# Patient Record
Sex: FEMALE | Race: WHITE | NOT HISPANIC OR LATINO | Employment: OTHER | URBAN - NONMETROPOLITAN AREA
[De-identification: names, ages, dates, MRNs, and addresses within clinical notes are randomized per-mention and may not be internally consistent; named-entity substitution may affect disease eponyms.]

---

## 2023-03-09 ENCOUNTER — TELEPHONE (OUTPATIENT)
Dept: PRIMARY CARE | Facility: CLINIC | Age: 78
End: 2023-03-09
Payer: COMMERCIAL

## 2023-03-09 DIAGNOSIS — I10 BENIGN HYPERTENSION: ICD-10-CM

## 2023-03-09 DIAGNOSIS — I48.0 PAROXYSMAL ATRIAL FIBRILLATION (MULTI): ICD-10-CM

## 2023-03-10 RX ORDER — INSULIN LISPRO 100 [IU]/ML
INJECTION, SUSPENSION SUBCUTANEOUS
COMMUNITY
End: 2023-05-01 | Stop reason: SDUPTHER

## 2023-03-10 RX ORDER — ACETAMINOPHEN 325 MG/1
TABLET, CHEWABLE ORAL
COMMUNITY

## 2023-03-10 RX ORDER — ASPIRIN 81 MG/1
1 TABLET ORAL DAILY
COMMUNITY

## 2023-03-10 RX ORDER — PEN NEEDLE, DIABETIC 32 GX 1/4"
NEEDLE, DISPOSABLE MISCELLANEOUS
COMMUNITY
Start: 2022-06-14 | End: 2023-05-01 | Stop reason: SDUPTHER

## 2023-03-10 RX ORDER — MULTIVITAMIN
TABLET ORAL
COMMUNITY

## 2023-03-10 RX ORDER — LOVASTATIN 20 MG/1
20 TABLET ORAL NIGHTLY
COMMUNITY
End: 2024-05-20 | Stop reason: SDUPTHER

## 2023-03-10 RX ORDER — LISINOPRIL 40 MG/1
40 TABLET ORAL DAILY
COMMUNITY
End: 2023-07-28

## 2023-03-10 RX ORDER — APIXABAN 5 MG/1
1 TABLET, FILM COATED ORAL 2 TIMES DAILY
COMMUNITY
End: 2023-09-18 | Stop reason: SDUPTHER

## 2023-03-10 RX ORDER — ALLOPURINOL 300 MG/1
300 TABLET ORAL DAILY
COMMUNITY
End: 2024-04-25 | Stop reason: WASHOUT

## 2023-03-10 RX ORDER — BUMETANIDE 0.5 MG/1
0.5 TABLET ORAL DAILY
COMMUNITY
End: 2023-06-26 | Stop reason: ALTCHOICE

## 2023-03-10 RX ORDER — LEVOTHYROXINE SODIUM 137 UG/1
137 TABLET ORAL DAILY
COMMUNITY
End: 2024-02-20 | Stop reason: SDUPTHER

## 2023-03-10 RX ORDER — AMLODIPINE BESYLATE 5 MG/1
TABLET ORAL
COMMUNITY
Start: 2023-03-07 | End: 2023-07-28

## 2023-03-10 RX ORDER — METOPROLOL TARTRATE 75 MG/1
75 TABLET, FILM COATED ORAL 2 TIMES DAILY
Qty: 180 TABLET | Refills: 0 | Status: SHIPPED | OUTPATIENT
Start: 2023-03-10 | End: 2023-03-13 | Stop reason: SDUPTHER

## 2023-03-10 RX ORDER — LORATADINE 10 MG/1
TABLET ORAL
COMMUNITY

## 2023-03-10 RX ORDER — METOPROLOL TARTRATE 75 MG/1
75 TABLET, FILM COATED ORAL 2 TIMES DAILY
COMMUNITY
End: 2023-03-10 | Stop reason: SDUPTHER

## 2023-03-13 DIAGNOSIS — I48.0 PAROXYSMAL ATRIAL FIBRILLATION (MULTI): ICD-10-CM

## 2023-03-13 DIAGNOSIS — I10 BENIGN HYPERTENSION: ICD-10-CM

## 2023-03-13 RX ORDER — METOPROLOL TARTRATE 75 MG/1
75 TABLET, FILM COATED ORAL 2 TIMES DAILY
Qty: 180 TABLET | Refills: 0 | Status: SHIPPED | OUTPATIENT
Start: 2023-03-13 | End: 2023-05-01 | Stop reason: SDUPTHER

## 2023-04-25 LAB
ANION GAP IN SER/PLAS: 11 MMOL/L (ref 10–20)
CALCIUM (MG/DL) IN SER/PLAS: 8.7 MG/DL (ref 8.6–10.3)
CARBON DIOXIDE, TOTAL (MMOL/L) IN SER/PLAS: 34 MMOL/L (ref 21–32)
CHLORIDE (MMOL/L) IN SER/PLAS: 103 MMOL/L (ref 98–107)
CREATININE (MG/DL) IN SER/PLAS: 1.25 MG/DL (ref 0.5–1.05)
GFR FEMALE: 44 ML/MIN/1.73M2
GLUCOSE (MG/DL) IN SER/PLAS: 107 MG/DL (ref 74–99)
POTASSIUM (MMOL/L) IN SER/PLAS: 3.9 MMOL/L (ref 3.5–5.3)
SODIUM (MMOL/L) IN SER/PLAS: 144 MMOL/L (ref 136–145)
UREA NITROGEN (MG/DL) IN SER/PLAS: 30 MG/DL (ref 6–23)

## 2023-05-01 ENCOUNTER — OFFICE VISIT (OUTPATIENT)
Dept: PRIMARY CARE | Facility: CLINIC | Age: 78
End: 2023-05-01
Payer: COMMERCIAL

## 2023-05-01 VITALS
WEIGHT: 188.6 LBS | OXYGEN SATURATION: 94 % | HEART RATE: 68 BPM | BODY MASS INDEX: 35.61 KG/M2 | DIASTOLIC BLOOD PRESSURE: 83 MMHG | SYSTOLIC BLOOD PRESSURE: 160 MMHG | HEIGHT: 61 IN

## 2023-05-01 DIAGNOSIS — I10 BENIGN HYPERTENSION: ICD-10-CM

## 2023-05-01 DIAGNOSIS — E11.9 TYPE 2 DIABETES MELLITUS WITHOUT COMPLICATION, WITHOUT LONG-TERM CURRENT USE OF INSULIN (MULTI): ICD-10-CM

## 2023-05-01 DIAGNOSIS — Z00.00 ROUTINE GENERAL MEDICAL EXAMINATION AT HEALTH CARE FACILITY: ICD-10-CM

## 2023-05-01 DIAGNOSIS — I48.0 PAROXYSMAL ATRIAL FIBRILLATION (MULTI): ICD-10-CM

## 2023-05-01 DIAGNOSIS — E03.9 HYPOTHYROIDISM, UNSPECIFIED TYPE: Primary | ICD-10-CM

## 2023-05-01 PROCEDURE — 1157F ADVNC CARE PLAN IN RCRD: CPT | Performed by: STUDENT IN AN ORGANIZED HEALTH CARE EDUCATION/TRAINING PROGRAM

## 2023-05-01 PROCEDURE — 1160F RVW MEDS BY RX/DR IN RCRD: CPT | Performed by: STUDENT IN AN ORGANIZED HEALTH CARE EDUCATION/TRAINING PROGRAM

## 2023-05-01 PROCEDURE — 1170F FXNL STATUS ASSESSED: CPT | Performed by: STUDENT IN AN ORGANIZED HEALTH CARE EDUCATION/TRAINING PROGRAM

## 2023-05-01 PROCEDURE — 1036F TOBACCO NON-USER: CPT | Performed by: STUDENT IN AN ORGANIZED HEALTH CARE EDUCATION/TRAINING PROGRAM

## 2023-05-01 PROCEDURE — 3077F SYST BP >= 140 MM HG: CPT | Performed by: STUDENT IN AN ORGANIZED HEALTH CARE EDUCATION/TRAINING PROGRAM

## 2023-05-01 PROCEDURE — 99214 OFFICE O/P EST MOD 30 MIN: CPT | Performed by: STUDENT IN AN ORGANIZED HEALTH CARE EDUCATION/TRAINING PROGRAM

## 2023-05-01 PROCEDURE — 1159F MED LIST DOCD IN RCRD: CPT | Performed by: STUDENT IN AN ORGANIZED HEALTH CARE EDUCATION/TRAINING PROGRAM

## 2023-05-01 PROCEDURE — 3079F DIAST BP 80-89 MM HG: CPT | Performed by: STUDENT IN AN ORGANIZED HEALTH CARE EDUCATION/TRAINING PROGRAM

## 2023-05-01 RX ORDER — INSULIN LISPRO 100 [IU]/ML
INJECTION, SUSPENSION SUBCUTANEOUS
Qty: 9 ML | Refills: 3 | Status: SHIPPED | OUTPATIENT
Start: 2023-05-01 | End: 2023-09-15 | Stop reason: SDUPTHER

## 2023-05-01 RX ORDER — PEN NEEDLE, DIABETIC 32 GX 1/4"
NEEDLE, DISPOSABLE MISCELLANEOUS
Qty: 100 EACH | Refills: 3 | Status: SHIPPED | OUTPATIENT
Start: 2023-05-01

## 2023-05-01 RX ORDER — METOPROLOL TARTRATE 75 MG/1
75 TABLET, FILM COATED ORAL 2 TIMES DAILY
Qty: 180 TABLET | Refills: 3 | Status: SHIPPED | OUTPATIENT
Start: 2023-05-01 | End: 2023-07-28

## 2023-05-01 RX ORDER — FLASH GLUCOSE SCANNING READER
EACH MISCELLANEOUS
Qty: 1 EACH | Refills: 0 | Status: SHIPPED | OUTPATIENT
Start: 2023-05-01 | End: 2023-05-17 | Stop reason: SDUPTHER

## 2023-05-01 RX ORDER — FLASH GLUCOSE SENSOR
KIT MISCELLANEOUS
Qty: 2 EACH | Refills: 0 | Status: SHIPPED | OUTPATIENT
Start: 2023-05-01 | End: 2023-05-17 | Stop reason: SDUPTHER

## 2023-05-01 ASSESSMENT — PATIENT HEALTH QUESTIONNAIRE - PHQ9
1. LITTLE INTEREST OR PLEASURE IN DOING THINGS: NOT AT ALL
SUM OF ALL RESPONSES TO PHQ9 QUESTIONS 1 AND 2: 0
2. FEELING DOWN, DEPRESSED OR HOPELESS: NOT AT ALL

## 2023-05-01 ASSESSMENT — ACTIVITIES OF DAILY LIVING (ADL)
DRESSING: INDEPENDENT
MANAGING_FINANCES: INDEPENDENT
DOING_HOUSEWORK: INDEPENDENT
TAKING_MEDICATION: INDEPENDENT
GROCERY_SHOPPING: INDEPENDENT
BATHING: INDEPENDENT

## 2023-05-01 NOTE — PROGRESS NOTES
Subjective   Reason for Visit: Maira Shearer is an 77 y.o. female here for a Medicare Wellness visit.     Past Medical, Surgical, and Family History reviewed and updated in chart.    Reviewed all medications by prescribing practitioner or clinical pharmacist (such as prescriptions, OTCs, herbal therapies and supplements) and documented in the medical record.    PERLA Rao is a 76-year-old female here for follow-up. She is here with her daughter.    Diabetes mellitus: Patient has been regularly measuring her blood pressure and blood glucose levels. Reports that she has noticed low blood glucose levels recently.  Reports that she had an episode of hypoglycemia recently when she was at [employer].  Her lunch was delayed by an hour at that time.  Reports that she was unresponsive and when the glucose was checked it was 57.  Squad came in and gave more sugar.. No significant episodes of hypoglycemia. takes a little less when she notices the BG is low.  Reports that she is taking 15 units in the morning and 20 units at night.  Plan: BG levels have been reviewed. Appear to have most of her hypoglycemic events first in the morning.  She was actually prescribed to take 20 units in the morning and 15 units at night.  However she is taking 15 units in the morning and 20 units in the night.  This could potentially be because of her hypoglycemic events.  We will further reduce her insulin dosage to 15 twice daily.  Discussed about strategies to reduce further insulin dosage if blood glucose levels are consistently in low 80s.  Given the concerns of hypoglycemia and need for adjusting insulin dosage I am ordering continuous glucose monitor.    Hypertension and tachycardia: BP is elevated in the clinic today.  Home blood pressure readings are reviewed today.  Her blood pressure is mostly in the normal ranges at home.   Heart rate has been normal.    Plan: Blood pressure at home appears to be in the normal range.  We will continue  "to monitor with current regimen.    pedal edema is under control.     Hypothyroidism: Last check was last year, which was normal.     Patient Care Team:  Rik Farr MD MPH as PCP - General  Rik Farr MD MPH as PCP - [employer] PCP     Review of Systems    Objective   Vitals:  /83   Pulse 68   Ht 1.549 m (5' 1\")   Wt 85.5 kg (188 lb 9.6 oz)   SpO2 94%   BMI 35.64 kg/m²       Physical Exam  Constitutional:       Appearance: Normal appearance.   Cardiovascular:      Rate and Rhythm: Normal rate and regular rhythm.   Pulmonary:      Effort: Pulmonary effort is normal.      Breath sounds: Normal breath sounds.   Musculoskeletal:      Cervical back: Normal range of motion and neck supple.   Lymphadenopathy:      Cervical: No cervical adenopathy.   Neurological:      Mental Status: She is alert.         Assessment/Plan   Problem List Items Addressed This Visit    None  Visit Diagnoses       Hypothyroidism, unspecified type    -  Primary    Relevant Orders    TSH with reflex to Free T4 if abnormal    Type 2 diabetes mellitus without complication, without long-term current use of insulin (CMS/[facility])        Relevant Medications    FreeStyle Alannah sensor system (FreeStyle Alannah 14 Day Sensor) kit    FreeStyle Alannah reader (FreeStyle Alannah 2 Gary) misc    BD Ultra-Fine Micro Pen Needle 32 gauge x 1/4\" needle    HumaLOG Mix 75-25 KwikPen 100 unit/mL (75-25) injection    Other Relevant Orders    Hemoglobin A1C    Lipid Panel    Benign hypertension        Relevant Medications    metoprolol tartrate (Lopressor) 75 mg tablet    Paroxysmal atrial fibrillation (CMS/HCC)        Relevant Medications    metoprolol tartrate (Lopressor) 75 mg tablet    Routine general medical examination at health care facility                   "

## 2023-05-01 NOTE — PATIENT INSTRUCTIONS
Please ask your insurance company what kind of continues glucose monitoring system is covered.  Examples include freestyle galileo, Dexcom.

## 2023-05-03 DIAGNOSIS — E11.9 TYPE 2 DIABETES MELLITUS WITHOUT COMPLICATION, WITHOUT LONG-TERM CURRENT USE OF INSULIN (MULTI): Primary | ICD-10-CM

## 2023-05-03 RX ORDER — FLASH GLUCOSE SENSOR
KIT MISCELLANEOUS
Qty: 1 EACH | Refills: 0 | Status: SHIPPED | OUTPATIENT
Start: 2023-05-03 | End: 2023-05-17 | Stop reason: SDUPTHER

## 2023-05-17 ENCOUNTER — TELEPHONE (OUTPATIENT)
Dept: PRIMARY CARE | Facility: CLINIC | Age: 78
End: 2023-05-17
Payer: COMMERCIAL

## 2023-05-17 DIAGNOSIS — E11.9 TYPE 2 DIABETES MELLITUS WITHOUT COMPLICATION, WITHOUT LONG-TERM CURRENT USE OF INSULIN (MULTI): ICD-10-CM

## 2023-05-17 RX ORDER — FLASH GLUCOSE SENSOR
KIT MISCELLANEOUS
Qty: 2 EACH | Refills: 0 | Status: SHIPPED | OUTPATIENT
Start: 2023-05-17 | End: 2023-06-29 | Stop reason: SDUPTHER

## 2023-05-17 RX ORDER — FLASH GLUCOSE SENSOR
KIT MISCELLANEOUS
Qty: 1 EACH | Refills: 0 | Status: SHIPPED | OUTPATIENT
Start: 2023-05-17 | End: 2023-06-02 | Stop reason: SDUPTHER

## 2023-05-17 RX ORDER — FLASH GLUCOSE SCANNING READER
EACH MISCELLANEOUS
Qty: 1 EACH | Refills: 0 | Status: SHIPPED | OUTPATIENT
Start: 2023-05-17 | End: 2023-07-10 | Stop reason: SDUPTHER

## 2023-05-17 NOTE — TELEPHONE ENCOUNTER
Her tenzin sensor came off last night while she was sleeping. She has no refills left. Asking for a refill. No pharmacy left on message. Asking for a call back.

## 2023-06-01 ENCOUNTER — TELEPHONE (OUTPATIENT)
Dept: PRIMARY CARE | Facility: CLINIC | Age: 78
End: 2023-06-01
Payer: COMMERCIAL

## 2023-06-01 DIAGNOSIS — E11.9 TYPE 2 DIABETES MELLITUS WITHOUT COMPLICATION, WITHOUT LONG-TERM CURRENT USE OF INSULIN (MULTI): ICD-10-CM

## 2023-06-02 RX ORDER — FLASH GLUCOSE SENSOR
KIT MISCELLANEOUS
Qty: 1 EACH | Refills: 0 | Status: SHIPPED | OUTPATIENT
Start: 2023-06-02 | End: 2023-06-16 | Stop reason: SDUPTHER

## 2023-06-16 ENCOUNTER — TELEPHONE (OUTPATIENT)
Dept: PRIMARY CARE | Facility: CLINIC | Age: 78
End: 2023-06-16
Payer: COMMERCIAL

## 2023-06-16 DIAGNOSIS — E11.9 TYPE 2 DIABETES MELLITUS WITHOUT COMPLICATION, WITHOUT LONG-TERM CURRENT USE OF INSULIN (MULTI): ICD-10-CM

## 2023-06-16 RX ORDER — FLASH GLUCOSE SENSOR
KIT MISCELLANEOUS
Qty: 1 EACH | Refills: 0 | Status: SHIPPED | OUTPATIENT
Start: 2023-06-16 | End: 2023-07-26 | Stop reason: SDUPTHER

## 2023-06-23 ENCOUNTER — LAB (OUTPATIENT)
Dept: LAB | Facility: LAB | Age: 78
End: 2023-06-23
Payer: COMMERCIAL

## 2023-06-23 DIAGNOSIS — E03.9 HYPOTHYROIDISM, UNSPECIFIED TYPE: ICD-10-CM

## 2023-06-23 DIAGNOSIS — E11.9 TYPE 2 DIABETES MELLITUS WITHOUT COMPLICATION, WITHOUT LONG-TERM CURRENT USE OF INSULIN (MULTI): ICD-10-CM

## 2023-06-23 LAB
CHOLESTEROL (MG/DL) IN SER/PLAS: 112 MG/DL (ref 0–199)
CHOLESTEROL IN HDL (MG/DL) IN SER/PLAS: 44 MG/DL
CHOLESTEROL/HDL RATIO: 2.5
ESTIMATED AVERAGE GLUCOSE FOR HBA1C: 131 MG/DL
HEMOGLOBIN A1C/HEMOGLOBIN TOTAL IN BLOOD: 6.2 %
LDL: 46 MG/DL (ref 0–99)
THYROTROPIN (MIU/L) IN SER/PLAS BY DETECTION LIMIT <= 0.05 MIU/L: 5.08 MIU/L (ref 0.44–3.98)
THYROXINE (T4) FREE (NG/DL) IN SER/PLAS: 0.94 NG/DL (ref 0.61–1.12)
TRIGLYCERIDE (MG/DL) IN SER/PLAS: 108 MG/DL (ref 0–149)
VLDL: 22 MG/DL (ref 0–40)

## 2023-06-23 PROCEDURE — 36415 COLL VENOUS BLD VENIPUNCTURE: CPT

## 2023-06-23 PROCEDURE — 84443 ASSAY THYROID STIM HORMONE: CPT

## 2023-06-23 PROCEDURE — 83036 HEMOGLOBIN GLYCOSYLATED A1C: CPT

## 2023-06-23 PROCEDURE — 84439 ASSAY OF FREE THYROXINE: CPT

## 2023-06-23 PROCEDURE — 80061 LIPID PANEL: CPT

## 2023-06-26 ENCOUNTER — OFFICE VISIT (OUTPATIENT)
Dept: PRIMARY CARE | Facility: CLINIC | Age: 78
End: 2023-06-26
Payer: COMMERCIAL

## 2023-06-26 VITALS
WEIGHT: 189.7 LBS | BODY MASS INDEX: 35.84 KG/M2 | SYSTOLIC BLOOD PRESSURE: 120 MMHG | HEART RATE: 58 BPM | DIASTOLIC BLOOD PRESSURE: 90 MMHG | OXYGEN SATURATION: 97 %

## 2023-06-26 DIAGNOSIS — R60.0 PEDAL EDEMA: ICD-10-CM

## 2023-06-26 DIAGNOSIS — N18.32 STAGE 3B CHRONIC KIDNEY DISEASE (MULTI): Primary | ICD-10-CM

## 2023-06-26 PROCEDURE — 1160F RVW MEDS BY RX/DR IN RCRD: CPT | Performed by: STUDENT IN AN ORGANIZED HEALTH CARE EDUCATION/TRAINING PROGRAM

## 2023-06-26 PROCEDURE — 99213 OFFICE O/P EST LOW 20 MIN: CPT | Performed by: STUDENT IN AN ORGANIZED HEALTH CARE EDUCATION/TRAINING PROGRAM

## 2023-06-26 PROCEDURE — 1159F MED LIST DOCD IN RCRD: CPT | Performed by: STUDENT IN AN ORGANIZED HEALTH CARE EDUCATION/TRAINING PROGRAM

## 2023-06-26 PROCEDURE — 1036F TOBACCO NON-USER: CPT | Performed by: STUDENT IN AN ORGANIZED HEALTH CARE EDUCATION/TRAINING PROGRAM

## 2023-06-26 PROCEDURE — 1157F ADVNC CARE PLAN IN RCRD: CPT | Performed by: STUDENT IN AN ORGANIZED HEALTH CARE EDUCATION/TRAINING PROGRAM

## 2023-06-26 RX ORDER — POTASSIUM CHLORIDE 1500 MG/1
1 TABLET, EXTENDED RELEASE ORAL DAILY
COMMUNITY
Start: 2022-11-26 | End: 2023-07-28

## 2023-06-26 RX ORDER — BUMETANIDE 1 MG/1
1 TABLET ORAL DAILY
Qty: 90 TABLET | Refills: 3 | Status: SHIPPED | OUTPATIENT
Start: 2023-06-26 | End: 2023-10-31 | Stop reason: SDUPTHER

## 2023-06-26 ASSESSMENT — PATIENT HEALTH QUESTIONNAIRE - PHQ9
SUM OF ALL RESPONSES TO PHQ9 QUESTIONS 1 AND 2: 0
1. LITTLE INTEREST OR PLEASURE IN DOING THINGS: NOT AT ALL
2. FEELING DOWN, DEPRESSED OR HOPELESS: NOT AT ALL

## 2023-06-26 NOTE — PROGRESS NOTES
Subjective   Patient ID: Maria Shearer is a 78 y.o. female who presents for sick (Swelling in feet. Swelling started about a week ago. Tried propping the feet up. Denies leakage from the feet. ).    HPI    Swelling in feet noticed recently. Has also noticed a blister which has opened. She noticed clear fluid draining from the lesion. Denies new/worsening other symptoms. No SOB.     Review of Systems  ROS negative except discussed above in HPI.    Vitals:    06/26/23 1324   BP: 120/90   Pulse: 58   SpO2: 97%     Objective   Physical Exam  Bilateral pedal edema noticed.   Clear discharge noticed from the open wound in the left shin of the tibia area. No erythema or tenderness noticed.     Assessment/Plan   Maira was seen today for sick.  Diagnoses and all orders for this visit:  Stage 3b chronic kidney disease (Primary)  -     Basic metabolic panel; Standing  Pedal edema  -     bumetanide (Bumex) 1 mg tablet; Take 1 tablet (1 mg) by mouth once daily.      Follow up in 1 month. Labs soon.         Rik Farr MD MPH

## 2023-06-29 ENCOUNTER — TELEPHONE (OUTPATIENT)
Dept: PRIMARY CARE | Facility: CLINIC | Age: 78
End: 2023-06-29
Payer: COMMERCIAL

## 2023-06-29 DIAGNOSIS — E11.9 TYPE 2 DIABETES MELLITUS WITHOUT COMPLICATION, WITHOUT LONG-TERM CURRENT USE OF INSULIN (MULTI): ICD-10-CM

## 2023-06-29 RX ORDER — FLASH GLUCOSE SENSOR
KIT MISCELLANEOUS
Qty: 2 EACH | Refills: 2 | Status: SHIPPED | OUTPATIENT
Start: 2023-06-29 | End: 2024-05-17 | Stop reason: SDUPTHER

## 2023-06-29 NOTE — TELEPHONE ENCOUNTER
Patient might be out of town all of next week, so asking if a refill of for the galileo sensor can be sent to  pharmacy.

## 2023-06-30 ENCOUNTER — LAB (OUTPATIENT)
Dept: LAB | Facility: LAB | Age: 78
End: 2023-06-30
Payer: COMMERCIAL

## 2023-06-30 DIAGNOSIS — N18.32 STAGE 3B CHRONIC KIDNEY DISEASE (MULTI): ICD-10-CM

## 2023-06-30 LAB
ANION GAP IN SER/PLAS: 11 MMOL/L (ref 10–20)
CALCIUM (MG/DL) IN SER/PLAS: 9.2 MG/DL (ref 8.6–10.3)
CARBON DIOXIDE, TOTAL (MMOL/L) IN SER/PLAS: 36 MMOL/L (ref 21–32)
CHLORIDE (MMOL/L) IN SER/PLAS: 100 MMOL/L (ref 98–107)
CREATININE (MG/DL) IN SER/PLAS: 1.3 MG/DL (ref 0.5–1.05)
GFR FEMALE: 42 ML/MIN/1.73M2
GLUCOSE (MG/DL) IN SER/PLAS: 88 MG/DL (ref 74–99)
POTASSIUM (MMOL/L) IN SER/PLAS: 4 MMOL/L (ref 3.5–5.3)
SODIUM (MMOL/L) IN SER/PLAS: 143 MMOL/L (ref 136–145)
UREA NITROGEN (MG/DL) IN SER/PLAS: 31 MG/DL (ref 6–23)

## 2023-06-30 PROCEDURE — 80048 BASIC METABOLIC PNL TOTAL CA: CPT

## 2023-06-30 PROCEDURE — 36415 COLL VENOUS BLD VENIPUNCTURE: CPT

## 2023-07-10 DIAGNOSIS — E11.9 TYPE 2 DIABETES MELLITUS WITHOUT COMPLICATION, WITHOUT LONG-TERM CURRENT USE OF INSULIN (MULTI): ICD-10-CM

## 2023-07-10 RX ORDER — FLASH GLUCOSE SCANNING READER
EACH MISCELLANEOUS
Qty: 1 EACH | Refills: 1 | Status: SHIPPED | OUTPATIENT
Start: 2023-07-10 | End: 2023-07-26 | Stop reason: SDUPTHER

## 2023-07-12 DIAGNOSIS — N18.32 STAGE 3B CHRONIC KIDNEY DISEASE (MULTI): Primary | ICD-10-CM

## 2023-07-25 ENCOUNTER — APPOINTMENT (OUTPATIENT)
Dept: PRIMARY CARE | Facility: CLINIC | Age: 78
End: 2023-07-25
Payer: COMMERCIAL

## 2023-07-26 ENCOUNTER — TELEPHONE (OUTPATIENT)
Dept: PRIMARY CARE | Facility: CLINIC | Age: 78
End: 2023-07-26
Payer: COMMERCIAL

## 2023-07-26 DIAGNOSIS — E11.9 TYPE 2 DIABETES MELLITUS WITHOUT COMPLICATION, WITHOUT LONG-TERM CURRENT USE OF INSULIN (MULTI): ICD-10-CM

## 2023-07-26 RX ORDER — FLASH GLUCOSE SENSOR
KIT MISCELLANEOUS
Qty: 1 EACH | Refills: 2 | Status: SHIPPED | OUTPATIENT
Start: 2023-07-26 | End: 2023-08-18 | Stop reason: SDUPTHER

## 2023-07-26 RX ORDER — FLASH GLUCOSE SCANNING READER
EACH MISCELLANEOUS
Qty: 1 EACH | Refills: 1 | Status: SHIPPED | OUTPATIENT
Start: 2023-07-26 | End: 2024-05-17 | Stop reason: SDUPTHER

## 2023-07-26 NOTE — TELEPHONE ENCOUNTER
Daughter called and would like you to give her a call as soon as you can. Her mom is coming home from the hospital today and has some concerns about her galileo sensor.

## 2023-07-27 ENCOUNTER — PATIENT OUTREACH (OUTPATIENT)
Dept: CARE COORDINATION | Facility: CLINIC | Age: 78
End: 2023-07-27
Payer: COMMERCIAL

## 2023-07-27 DIAGNOSIS — R00.1 BRADYCARDIA: ICD-10-CM

## 2023-07-27 RX ORDER — AMIODARONE HYDROCHLORIDE 400 MG/1
400 TABLET ORAL 2 TIMES DAILY
COMMUNITY
Start: 2023-07-27 | End: 2023-09-01 | Stop reason: ALTCHOICE

## 2023-07-27 RX ORDER — AMIODARONE HYDROCHLORIDE 200 MG/1
200 TABLET ORAL 2 TIMES DAILY
COMMUNITY
Start: 2023-08-04 | End: 2023-09-01 | Stop reason: ALTCHOICE

## 2023-07-27 RX ORDER — AMIODARONE HYDROCHLORIDE 200 MG/1
200 TABLET ORAL DAILY
COMMUNITY
Start: 2023-08-11 | End: 2023-09-01 | Stop reason: ALTCHOICE

## 2023-07-27 NOTE — PROGRESS NOTES
Discharge Facility:[facility]  Discharge Diagnosis:Bradycardia, afib  Admission Date:7.12.23  Discharge Date: 7.26.23    PCP Appointment Date:7.28.23  Specialist Appointment Date: Unknown  Hospital Encounter and Summary: Linked  See discharge assessment below for further details     Engagement  Call Start Time: 1220 (7/27/2023 12:20 PM)    Medications  Medications reviewed with patient/caregiver?: -- (new meds only) (7/27/2023 12:20 PM)  Is the patient having any side effects they believe may be caused by any medication additions or changes?: No (7/27/2023 12:20 PM)  Does the patient have all medications ordered at discharge?: Yes (7/27/2023 12:20 PM)  Care Management Interventions: Provided patient education (7/27/2023 12:20 PM)  Is the patient taking all medications as directed (includes completed medication regime)?: Yes (7/27/2023 12:20 PM)  Care Management Interventions: Provided patient education (7/27/2023 12:20 PM)    Appointments  Does the patient have a primary care provider?: Yes (7/27/2023 12:20 PM)  Care Management Interventions: Verified appointment date/time/provider (7/27/2023 12:20 PM)  Care Management Interventions: Advised patient to keep appointment (7/27/2023 12:20 PM)    Self Management  What is the home health agency?: Per [facility]. (7/27/2023 12:20 PM)    Patient Teaching  Does the patient have access to their discharge instructions?: Yes (7/27/2023 12:20 PM)  Care Management Interventions: Reviewed instructions with patient (reviewed with dtr in law) (7/27/2023 12:20 PM)  What is the patient's perception of their health status since discharge?: Improving (7/27/2023 12:20 PM)  Is the patient/caregiver able to teach back the hierarchy of who to call/visit for symptoms/problems? PCP, Specialist, Home Health nurse, Urgent Care, ED, 911: Yes (7/27/2023 12:20 PM)    Wrap Up  Wrap Up Additional Comments: SPoke with dtr in law. Incision from Pacemaker insertion intact and will be going for a  wound check next week. There is confusion about the meds on dc med list from the hospital Metoprolol is now succinate. There is no mention of Potassium. Also Lasix is on the med lsit instead of Bumex. Appt made with PCP 7.28.23 to clarify. Advised patient to take the Bumex as PCP advised and the Potassium. Also informed on dosage of new med  Amiodarone. Patient is doing well per dtr in law. Need clarification on meds to take from PCP. (7/27/2023 12:20 PM)

## 2023-07-28 ENCOUNTER — LAB (OUTPATIENT)
Dept: LAB | Facility: LAB | Age: 78
End: 2023-07-28
Payer: COMMERCIAL

## 2023-07-28 ENCOUNTER — TELEPHONE (OUTPATIENT)
Dept: PRIMARY CARE | Facility: CLINIC | Age: 78
End: 2023-07-28

## 2023-07-28 DIAGNOSIS — N18.32 STAGE 3B CHRONIC KIDNEY DISEASE (MULTI): ICD-10-CM

## 2023-07-28 DIAGNOSIS — R60.0 PEDAL EDEMA: Primary | ICD-10-CM

## 2023-07-28 LAB
ANION GAP IN SER/PLAS: 15 MMOL/L (ref 10–20)
CALCIUM (MG/DL) IN SER/PLAS: 8.2 MG/DL (ref 8.6–10.3)
CARBON DIOXIDE, TOTAL (MMOL/L) IN SER/PLAS: 31 MMOL/L (ref 21–32)
CHLORIDE (MMOL/L) IN SER/PLAS: 96 MMOL/L (ref 98–107)
CREATININE (MG/DL) IN SER/PLAS: 1.32 MG/DL (ref 0.5–1.05)
GFR FEMALE: 41 ML/MIN/1.73M2
GLUCOSE (MG/DL) IN SER/PLAS: 235 MG/DL (ref 74–99)
POTASSIUM (MMOL/L) IN SER/PLAS: 3.8 MMOL/L (ref 3.5–5.3)
SODIUM (MMOL/L) IN SER/PLAS: 138 MMOL/L (ref 136–145)
UREA NITROGEN (MG/DL) IN SER/PLAS: 22 MG/DL (ref 6–23)

## 2023-07-28 PROCEDURE — 36415 COLL VENOUS BLD VENIPUNCTURE: CPT

## 2023-07-28 PROCEDURE — 80048 BASIC METABOLIC PNL TOTAL CA: CPT

## 2023-08-17 ENCOUNTER — TELEPHONE (OUTPATIENT)
Dept: PRIMARY CARE | Facility: CLINIC | Age: 78
End: 2023-08-17
Payer: COMMERCIAL

## 2023-08-17 DIAGNOSIS — E11.9 TYPE 2 DIABETES MELLITUS WITHOUT COMPLICATION, WITHOUT LONG-TERM CURRENT USE OF INSULIN (MULTI): ICD-10-CM

## 2023-08-18 RX ORDER — FLASH GLUCOSE SENSOR
KIT MISCELLANEOUS
Qty: 1 EACH | Refills: 2 | Status: SHIPPED | OUTPATIENT
Start: 2023-08-18 | End: 2023-09-15 | Stop reason: SDUPTHER

## 2023-09-01 ENCOUNTER — OFFICE VISIT (OUTPATIENT)
Dept: PRIMARY CARE | Facility: CLINIC | Age: 78
End: 2023-09-01
Payer: COMMERCIAL

## 2023-09-01 ENCOUNTER — LAB (OUTPATIENT)
Dept: LAB | Facility: LAB | Age: 78
End: 2023-09-01
Payer: COMMERCIAL

## 2023-09-01 VITALS
DIASTOLIC BLOOD PRESSURE: 94 MMHG | HEART RATE: 94 BPM | WEIGHT: 196.5 LBS | OXYGEN SATURATION: 95 % | BODY MASS INDEX: 37.13 KG/M2 | SYSTOLIC BLOOD PRESSURE: 132 MMHG

## 2023-09-01 DIAGNOSIS — M25.511 ACUTE PAIN OF RIGHT SHOULDER: ICD-10-CM

## 2023-09-01 DIAGNOSIS — Z00.00 ROUTINE GENERAL MEDICAL EXAMINATION AT HEALTH CARE FACILITY: Primary | ICD-10-CM

## 2023-09-01 DIAGNOSIS — E22.2 SIADH (SYNDROME OF INAPPROPRIATE ADH PRODUCTION) (MULTI): ICD-10-CM

## 2023-09-01 DIAGNOSIS — N18.32 STAGE 3B CHRONIC KIDNEY DISEASE (MULTI): ICD-10-CM

## 2023-09-01 DIAGNOSIS — Z71.89 ADVANCE DIRECTIVE DISCUSSED WITH PATIENT: ICD-10-CM

## 2023-09-01 DIAGNOSIS — Z13.31 DEPRESSION SCREENING: ICD-10-CM

## 2023-09-01 DIAGNOSIS — R60.0 PEDAL EDEMA: ICD-10-CM

## 2023-09-01 LAB
ANION GAP IN SER/PLAS: 13 MMOL/L (ref 10–20)
CALCIUM (MG/DL) IN SER/PLAS: 8.8 MG/DL (ref 8.6–10.3)
CARBON DIOXIDE, TOTAL (MMOL/L) IN SER/PLAS: 36 MMOL/L (ref 21–32)
CHLORIDE (MMOL/L) IN SER/PLAS: 98 MMOL/L (ref 98–107)
CREATININE (MG/DL) IN SER/PLAS: 1.25 MG/DL (ref 0.5–1.05)
GFR FEMALE: 44 ML/MIN/1.73M2
GLUCOSE (MG/DL) IN SER/PLAS: 106 MG/DL (ref 74–99)
POTASSIUM (MMOL/L) IN SER/PLAS: 4.1 MMOL/L (ref 3.5–5.3)
SODIUM (MMOL/L) IN SER/PLAS: 143 MMOL/L (ref 136–145)
UREA NITROGEN (MG/DL) IN SER/PLAS: 16 MG/DL (ref 6–23)

## 2023-09-01 PROCEDURE — G0444 DEPRESSION SCREEN ANNUAL: HCPCS | Performed by: STUDENT IN AN ORGANIZED HEALTH CARE EDUCATION/TRAINING PROGRAM

## 2023-09-01 PROCEDURE — 1170F FXNL STATUS ASSESSED: CPT | Performed by: STUDENT IN AN ORGANIZED HEALTH CARE EDUCATION/TRAINING PROGRAM

## 2023-09-01 PROCEDURE — G0439 PPPS, SUBSEQ VISIT: HCPCS | Performed by: STUDENT IN AN ORGANIZED HEALTH CARE EDUCATION/TRAINING PROGRAM

## 2023-09-01 PROCEDURE — 99214 OFFICE O/P EST MOD 30 MIN: CPT | Performed by: STUDENT IN AN ORGANIZED HEALTH CARE EDUCATION/TRAINING PROGRAM

## 2023-09-01 PROCEDURE — 1157F ADVNC CARE PLAN IN RCRD: CPT | Performed by: STUDENT IN AN ORGANIZED HEALTH CARE EDUCATION/TRAINING PROGRAM

## 2023-09-01 PROCEDURE — 80048 BASIC METABOLIC PNL TOTAL CA: CPT

## 2023-09-01 PROCEDURE — 1160F RVW MEDS BY RX/DR IN RCRD: CPT | Performed by: STUDENT IN AN ORGANIZED HEALTH CARE EDUCATION/TRAINING PROGRAM

## 2023-09-01 PROCEDURE — 36415 COLL VENOUS BLD VENIPUNCTURE: CPT

## 2023-09-01 PROCEDURE — 1159F MED LIST DOCD IN RCRD: CPT | Performed by: STUDENT IN AN ORGANIZED HEALTH CARE EDUCATION/TRAINING PROGRAM

## 2023-09-01 PROCEDURE — 1036F TOBACCO NON-USER: CPT | Performed by: STUDENT IN AN ORGANIZED HEALTH CARE EDUCATION/TRAINING PROGRAM

## 2023-09-01 RX ORDER — AMLODIPINE BESYLATE 5 MG/1
1 TABLET ORAL DAILY
COMMUNITY
Start: 2019-05-06 | End: 2023-11-22

## 2023-09-01 RX ORDER — POTASSIUM CHLORIDE 1500 MG/1
20 TABLET, EXTENDED RELEASE ORAL DAILY
COMMUNITY
Start: 2023-08-13 | End: 2024-05-20 | Stop reason: SDUPTHER

## 2023-09-01 RX ORDER — METOPROLOL TARTRATE 75 MG/1
75 TABLET, FILM COATED ORAL 2 TIMES DAILY
COMMUNITY
End: 2023-12-04 | Stop reason: SDUPTHER

## 2023-09-01 RX ORDER — LISINOPRIL 40 MG/1
1 TABLET ORAL DAILY
COMMUNITY
End: 2023-10-03

## 2023-09-01 RX ORDER — METOPROLOL SUCCINATE 25 MG/1
25 TABLET, EXTENDED RELEASE ORAL 2 TIMES DAILY
COMMUNITY
Start: 2023-07-26 | End: 2023-12-04 | Stop reason: SDDI

## 2023-09-01 ASSESSMENT — ACTIVITIES OF DAILY LIVING (ADL)
BATHING: INDEPENDENT
GROCERY_SHOPPING: NEEDS ASSISTANCE
DRESSING: INDEPENDENT
TAKING_MEDICATION: INDEPENDENT
MANAGING_FINANCES: INDEPENDENT
DOING_HOUSEWORK: INDEPENDENT

## 2023-09-01 NOTE — PROGRESS NOTES
Subjective   Reason for Visit: Maira Shearer is an 78 y.o. female here for a Medicare Wellness visit.     Past Medical, Surgical, and Family History reviewed and updated in chart.    Reviewed all medications by prescribing practitioner or clinical pharmacist (such as prescriptions, OTCs, herbal therapies and supplements) and documented in the medical record.    Chief Complaint   Patient presents with    Follow-up     PT is here for 4 month FUV and AWV. PT had a pace maker, states she has been doing alright since the surgery. Noticed swelling in her legs and arms. Started before surgery but is not sure if it has gotten worse since surgery. Family member states her arm has been swelling since surgery. There are some questions about medications.      HPI    Pedal edema: Patient appears to be not taking medications as prescribed.   She is not taking Bumetanide.   Recommended to start taking it now.   Will have close follow up.     Other medications have been clarified.     Recommended to discuss with cardiologist, if they have taken her off of Amiodarone.     Right shoulder pain: since the ICD placement patient has not been able to lift her right shoulder above 70 degrees. Reports that for two weeks her arm was wrapped to the chest. After removal of that, she has not been able to lift her arm.   Plan: Appears to have developed frozen shoulder. Referral to PT is ordered. May consider joint injection in future.       DM2: Currently taking 20 Units insulin BID. Reviewed her blood glucose number which are under control.     HTN: Blood pressure is also under control.     Patient Care Team:  Rik Farr MD MPH as PCP - General  Rik Farr MD MPH as PCP - [employer] PCP  Lissett Kwon RN as Care Manager (Case Management)     Review of Systems  ROS negative except discussed above in HPI.    Objective   Vitals:  BP (!) 132/94   Pulse 94   Wt 89.1 kg (196 lb 8 oz)   SpO2 95%    BMI 37.13 kg/m²       Physical Exam  Constitutional:       Appearance: Normal appearance.   Cardiovascular:      Rate and Rhythm: Normal rate and regular rhythm.   Pulmonary:      Effort: Pulmonary effort is normal.      Breath sounds: Normal breath sounds.   Musculoskeletal:      Cervical back: Normal range of motion and neck supple.      Right lower leg: Edema present.      Left lower leg: Edema present.   Lymphadenopathy:      Cervical: No cervical adenopathy.   Neurological:      Mental Status: She is alert.         Assessment/Plan   Problem List Items Addressed This Visit       SIADH (syndrome of inappropriate ADH production) (CMS/HCC)    Current Assessment & Plan     Under control. Recent labs normal.          Other Visit Diagnoses       Routine general medical examination at health care facility    -  Primary    Stage 3b chronic kidney disease (CMS/HCC)        Relevant Orders    Basic Metabolic Panel    Acute pain of right shoulder        Relevant Orders    Referral to Physical Therapy    Pedal edema              Depression Screening done    Follow up in a week.

## 2023-09-14 ENCOUNTER — PATIENT OUTREACH (OUTPATIENT)
Dept: CARE COORDINATION | Facility: CLINIC | Age: 78
End: 2023-09-14
Payer: COMMERCIAL

## 2023-09-14 NOTE — PROGRESS NOTES
Unable to reach patient for one month post discharge follow up call.               No voicemail available call attempts x 2 were made to contact the patient to   assist with  any questions or concerns patient may have.

## 2023-09-15 ENCOUNTER — TELEPHONE (OUTPATIENT)
Dept: PRIMARY CARE | Facility: CLINIC | Age: 78
End: 2023-09-15
Payer: COMMERCIAL

## 2023-09-15 DIAGNOSIS — E11.9 TYPE 2 DIABETES MELLITUS WITHOUT COMPLICATION, WITHOUT LONG-TERM CURRENT USE OF INSULIN (MULTI): ICD-10-CM

## 2023-09-15 RX ORDER — FLASH GLUCOSE SENSOR
KIT MISCELLANEOUS
Qty: 1 EACH | Refills: 2 | Status: SHIPPED | OUTPATIENT
Start: 2023-09-15 | End: 2023-12-12 | Stop reason: SDUPTHER

## 2023-09-15 RX ORDER — INSULIN LISPRO 100 [IU]/ML
INJECTION, SUSPENSION SUBCUTANEOUS
Qty: 9 ML | Refills: 3 | Status: SHIPPED | OUTPATIENT
Start: 2023-09-15 | End: 2023-12-21 | Stop reason: SDUPTHER

## 2023-09-15 NOTE — TELEPHONE ENCOUNTER
Send refill for FreeStyle Alannah sensor system and HumaLOG Mix 75-25 KwikPen 100 unit/mL (75-25) injection to pharmacy.    No pharmacy left in message.

## 2023-09-18 ENCOUNTER — TELEPHONE (OUTPATIENT)
Dept: PRIMARY CARE | Facility: CLINIC | Age: 78
End: 2023-09-18
Payer: COMMERCIAL

## 2023-09-18 DIAGNOSIS — I48.0 PAROXYSMAL ATRIAL FIBRILLATION (MULTI): ICD-10-CM

## 2023-09-18 NOTE — TELEPHONE ENCOUNTER
Spoke to Lionel. Told her the sensors and humalog were sent 9/15/23 and I proposed the eliquis today. Voiced understanding and had no further questions.

## 2023-10-03 DIAGNOSIS — I10 HYPERTENSION, UNSPECIFIED TYPE: Primary | ICD-10-CM

## 2023-10-03 RX ORDER — LISINOPRIL 40 MG/1
40 TABLET ORAL DAILY
Qty: 90 TABLET | Refills: 3 | Status: SHIPPED | OUTPATIENT
Start: 2023-10-03 | End: 2023-12-04 | Stop reason: SDUPTHER

## 2023-10-24 ENCOUNTER — PATIENT OUTREACH (OUTPATIENT)
Dept: CARE COORDINATION | Facility: CLINIC | Age: 78
End: 2023-10-24
Payer: COMMERCIAL

## 2023-10-24 NOTE — PROGRESS NOTES
Unable to reach patient at 90 days post dc appt.    Patient has met target of no readmission for 90 days post hospital discharge and is graduated from Transitional Care Management program at this time.

## 2023-10-31 ENCOUNTER — TELEPHONE (OUTPATIENT)
Dept: PRIMARY CARE | Facility: CLINIC | Age: 78
End: 2023-10-31
Payer: COMMERCIAL

## 2023-10-31 DIAGNOSIS — R60.0 PEDAL EDEMA: ICD-10-CM

## 2023-10-31 RX ORDER — BUMETANIDE 1 MG/1
1 TABLET ORAL DAILY
Qty: 90 TABLET | Refills: 3 | Status: SHIPPED | OUTPATIENT
Start: 2023-10-31 | End: 2024-05-03 | Stop reason: SDUPTHER

## 2023-10-31 NOTE — TELEPHONE ENCOUNTER
[facility] [place]

## 2023-11-22 DIAGNOSIS — I10 ESSENTIAL HYPERTENSION: ICD-10-CM

## 2023-11-22 RX ORDER — AMLODIPINE BESYLATE 5 MG/1
5 TABLET ORAL DAILY
Qty: 90 TABLET | Refills: 0 | Status: SHIPPED | OUTPATIENT
Start: 2023-11-22 | End: 2024-02-22

## 2023-12-04 ENCOUNTER — OFFICE VISIT (OUTPATIENT)
Dept: CARDIOLOGY | Facility: CLINIC | Age: 78
End: 2023-12-04
Payer: COMMERCIAL

## 2023-12-04 VITALS
OXYGEN SATURATION: 96 % | DIASTOLIC BLOOD PRESSURE: 68 MMHG | HEIGHT: 61 IN | BODY MASS INDEX: 38.51 KG/M2 | WEIGHT: 204 LBS | HEART RATE: 63 BPM | SYSTOLIC BLOOD PRESSURE: 140 MMHG

## 2023-12-04 DIAGNOSIS — I10 HYPERTENSION, UNSPECIFIED TYPE: ICD-10-CM

## 2023-12-04 DIAGNOSIS — R06.02 SHORTNESS OF BREATH: ICD-10-CM

## 2023-12-04 DIAGNOSIS — I48.21 PERMANENT ATRIAL FIBRILLATION (MULTI): Primary | ICD-10-CM

## 2023-12-04 DIAGNOSIS — Z95.0 PACEMAKER: ICD-10-CM

## 2023-12-04 PROCEDURE — 93000 ELECTROCARDIOGRAM COMPLETE: CPT | Performed by: STUDENT IN AN ORGANIZED HEALTH CARE EDUCATION/TRAINING PROGRAM

## 2023-12-04 PROCEDURE — 1160F RVW MEDS BY RX/DR IN RCRD: CPT | Performed by: STUDENT IN AN ORGANIZED HEALTH CARE EDUCATION/TRAINING PROGRAM

## 2023-12-04 PROCEDURE — 99214 OFFICE O/P EST MOD 30 MIN: CPT | Performed by: STUDENT IN AN ORGANIZED HEALTH CARE EDUCATION/TRAINING PROGRAM

## 2023-12-04 PROCEDURE — 3077F SYST BP >= 140 MM HG: CPT | Performed by: STUDENT IN AN ORGANIZED HEALTH CARE EDUCATION/TRAINING PROGRAM

## 2023-12-04 PROCEDURE — 1036F TOBACCO NON-USER: CPT | Performed by: STUDENT IN AN ORGANIZED HEALTH CARE EDUCATION/TRAINING PROGRAM

## 2023-12-04 PROCEDURE — 1159F MED LIST DOCD IN RCRD: CPT | Performed by: STUDENT IN AN ORGANIZED HEALTH CARE EDUCATION/TRAINING PROGRAM

## 2023-12-04 PROCEDURE — 3078F DIAST BP <80 MM HG: CPT | Performed by: STUDENT IN AN ORGANIZED HEALTH CARE EDUCATION/TRAINING PROGRAM

## 2023-12-04 RX ORDER — METOPROLOL TARTRATE 75 MG/1
75 TABLET, FILM COATED ORAL 2 TIMES DAILY
Qty: 30 TABLET | Refills: 11 | Status: SHIPPED | OUTPATIENT
Start: 2023-12-04 | End: 2024-06-10 | Stop reason: DRUGHIGH

## 2023-12-04 RX ORDER — LISINOPRIL 40 MG/1
40 TABLET ORAL DAILY
Qty: 90 TABLET | Refills: 3 | Status: SHIPPED | OUTPATIENT
Start: 2023-12-04 | End: 2024-03-18

## 2023-12-04 NOTE — PROGRESS NOTES
No chief complaint on file.    HPI:  I was requested by Dr. Farr to evaluate this patient in consultation for cardiac assessment.    Patient 78-year-old female with a medical history of S/P PPM (11/2023), hypertension, hyperlipidemia, diabetes, atrial fibrillation on Eliquis who was admitted and seen by me at [facility] on 5/17/2022 for runs of atrial fibrillation with ambulation.     Problem #1 paroxysmal atrial fibrillation  - On Eliquis 5 mg twice daily.  -She denies any symptoms. She denies any palpitations/shortness of breath with exertion/chest pain/orthopnea PND/lower extremity edema. Also says no to dizziness/lightheadedness/syncopal episodes.  -No bleeding complications on the Eliquis.     Problem #2 hypertension  -Currently on lisinopril 40 mg daily and amlodipine 5 mg daily  -Normotensive in clinic today     Problem #3 diabetes concurrent with hyperlipidemia  -On lovastatin 20 mg LDL was 51 mg/dL in 2022.     Problem #4 S/P PPM (11/29/2023)  - Recent PPM placement  - EKG on Afib       Past Medical History  Past Medical History:   Diagnosis Date    Encounter for full-term uncomplicated delivery     Normal vaginal delivery    Encounter for gynecological examination (general) (routine) without abnormal findings 09/01/2020    Encounter for routine gynecological examination    Other conditions influencing health status     Menstruation    Other fecal abnormalities     Stool guaiac positive    Personal history of other diseases of the circulatory system     History of CHF (congestive heart failure)    Personal history of other medical treatment 10/06/2021    H/O mammogram       Past Surgical History  Past Surgical History:   Procedure Laterality Date    CT ABDOMEN PELVIS ANGIOGRAM W AND/OR WO IV CONTRAST  07/21/2023    CT ABDOMEN PELVIS ANGIOGRAM W AND/OR WO IV CONTRAST 7/21/2023 [facility] CT    INSERT / REPLACE / REMOVE PACEMAKER      OTHER SURGICAL HISTORY  10/17/2019    Throat surgery     OTHER SURGICAL HISTORY  10/17/2019    Cholecystectomy    OTHER SURGICAL HISTORY  12/12/2019    Shoulder surgery    OTHER SURGICAL HISTORY  02/19/2020    Knee surgery    OTHER SURGICAL HISTORY  09/22/2020    Knee replacement    OTHER SURGICAL HISTORY  12/12/2019    Carpal tunnel surgery    OTHER SURGICAL HISTORY  12/12/2019    Thyroidectomy    OTHER SURGICAL HISTORY  12/12/2019    Cataract surgery       Past Family History  No family history on file.    Allergy History  Allergies   Allergen Reactions    Fenofibrate Hives    Oxycodone-Acetaminophen Unknown     vomitting    Aspirin Rash    Metformin Rash       Past Social History  Social History     Socioeconomic History    Marital status:      Spouse name: Not on file    Number of children: Not on file    Years of education: Not on file    Highest education level: Not on file   Occupational History    Not on file   Tobacco Use    Smoking status: Never    Smokeless tobacco: Never   Vaping Use    Vaping Use: Never used   Substance and Sexual Activity    Alcohol use: Never    Drug use: Never    Sexual activity: Not on file   Other Topics Concern    Not on file   Social History Narrative    Not on file     Social Determinants of Health     Financial Resource Strain: Not on file   Food Insecurity: Not on file   Transportation Needs: Not on file   Physical Activity: Not on file   Stress: Not on file   Social Connections: Not on file   Intimate Partner Violence: Not on file   Housing Stability: Not on file       Social History     Tobacco Use   Smoking Status Never   Smokeless Tobacco Never       Review of Systems:  Constitutional: Denies any fever or chills  Eyes: Denies any eye pain or blurry vision  ENT: Denies any ear pain or hearing loss  Cardiovascular: The heart rate is not slow, the heart rate is not fast  Respiratory: Denies any asthma/wheezing  Gastrointestinal: Denies any daniela colored stools or fatty food intolerance  Genitourinary: Denies any blood in the  urine or pelvic pain  Musculoskeletal: Denies any swelling in the joints or difficulty walking  Skin: Denies any skin lumps or skin lesions  Neurological: Denies any dizziness/tingling     Objective Data:  Last Recorded Vitals:  There were no vitals filed for this visit.    Last Labs:  CBC - 7/21/2023: 11:20 AM  7.5 13.0 128    40.2      CMP - 9/1/2023:  7:22 AM  8.8 7.7 29 --- 1.5   _ 4.3 15 270      PTT - 7/21/2023: 11:20 AM  1.7   19.3 42     TROPHS   Date/Time Value Ref Range Status   07/21/2023 11:20 AM 26 0 - 13 ng/L Final     Comment:     .  Less than 99th percentile of normal range cutoff-  Female and children under 18 years old <14 ng/L; Male <21 ng/L: Negative  Repeat testing should be performed if clinically indicated.   .  Female and children under 18 years old 14-50 ng/L; Male 21-50 ng/L:  Consistent with possible cardiac damage and possible increased clinical   risk. Serial measurements may help to assess extent of myocardial damage.   .  >50 ng/L: Consistent with cardiac damage, increased clinical risk and  myocardial infarction. Serial measurements may help assess extent of   myocardial damage.   .   NOTE: Children less than 1 year old may have higher baseline troponin   levels and results should be interpreted in conjunction with the overall   clinical context.   .  NOTE: Troponin I testing is performed using a different   testing methodology at [facility] than at other   [facility]. Direct result comparisons should only   be made within the same method.       BNP   Date/Time Value Ref Range Status   07/21/2023 11:20 AM 1,235 0 - 99 pg/mL Final     Comment:     .  <100 pg/mL - Heart failure unlikely  100-299 pg/mL - Intermediate probability of acute heart  .               failure exacerbation. Correlate with clinical  .               context and patient history.    >=300 pg/mL - Heart Failure likely. Correlate with clinical  .               context and patient history.  BNP  testing is performed using different testing   methodology at [facility] than at other   system [facility]. Direct result comparisons should   only be made within the same method.     05/19/2021 08:38  0 - 99 pg/mL Final     Comment:     .  <100 pg/mL - Heart failure unlikely  100-299 pg/mL - Intermediate probability of acute heart  .               failure exacerbation. Correlate with clinical  .               context and patient history.    >=300 pg/mL - Heart Failure likely. Correlate with clinical  .               context and patient history.  BNP testing is performed using different testing   methodology at [facility] than at other   system hospitals. Direct result comparisons should   only be made within the same method.       HGBA1C   Date/Time Value Ref Range Status   06/23/2023 07:25 AM 6.2 % Final     Comment:          Diagnosis of Diabetes-Adults   Non-Diabetic: < or = 5.6%   Increased risk for developing diabetes: 5.7-6.4%   Diagnostic of diabetes: > or = 6.5%  .       Monitoring of Diabetes                Age (y)     Therapeutic Goal (%)   Adults:          >18           <7.0   Pediatrics:    13-18           <7.5                   7-12           <8.0                   0- 6            7.5-8.5   American Diabetes Association. Diabetes Care 33(S1), Jan 2010.   01/11/2023 08:30 AM 6.6 % Final     Comment:          Diagnosis of Diabetes-Adults   Non-Diabetic: < or = 5.6%   Increased risk for developing diabetes: 5.7-6.4%   Diagnostic of diabetes: > or = 6.5%  .       Monitoring of Diabetes                Age (y)     Therapeutic Goal (%)   Adults:          >18           <7.0   Pediatrics:    13-18           <7.5                   7-12           <8.0                   0- 6            7.5-8.5   American Diabetes Association. Diabetes Care 33(S1), Jan 2010.     12/12/2021 02:37 AM 10.8 4.0 - 5.6 % Final   06/20/2021 06:05 AM 7.1 4.0 - 5.6 % Final     VLDL   Date/Time Value Ref  "Range Status   06/23/2023 07:25 AM 22 0 - 40 mg/dL Final   07/20/2022 07:25 AM 25 0 - 40 mg/dL Final   06/01/2021 12:05 PM 57 0 - 40 mg/dL Final        Patient Medications:  Outpatient Encounter Medications as of 12/4/2023   Medication Sig Dispense Refill    acetaminophen 325 mg chewable tablet Chew.      allopurinol (Zyloprim) 300 mg tablet Take 1 tablet (300 mg) by mouth once daily.      amLODIPine (Norvasc) 5 mg tablet Take 1 tablet by mouth once daily 90 tablet 0    apixaban (Eliquis) 5 mg tablet Take 1 tablet (5 mg) by mouth 2 times a day. 180 tablet 1    aspirin 81 mg EC tablet Take 1 tablet (81 mg) by mouth once daily.      BD Ultra-Fine Micro Pen Needle 32 gauge x 1/4\" needle Up to three times daily 100 each 3    bumetanide (Bumex) 1 mg tablet Take 1 tablet (1 mg) by mouth once daily. 90 tablet 3    FreeStyle Alannah reader (FreeStyle Alannah 2 [place]) misc Use as instructed 1 each 1    FreeStyle Alannah sensor system (FreeStyle Alannah 14 Day Sensor) kit Use as instructed 2 each 2    FreeStyle Alannah sensor system (FreeStyle Alannah 2 Sensor) kit Use as instructed 1 each 2    insulin lispro protamin-lispro (HumaLOG Mix 75-25 KwikPen) 100 unit/mL (75-25) injection INJECT 15 UNITS SUBCUTANEOUSLY IN THE MORNING and 15 UNITS NIGHTLY 9 mL 3    levothyroxine (Synthroid, Levoxyl) 137 mcg tablet Take 1 tablet (137 mcg) by mouth once daily.      lisinopril 40 mg tablet Take 1 tablet by mouth once daily 90 tablet 3    loratadine (Claritin) 10 mg tablet Take by mouth.      lovastatin (Mevacor) 20 mg tablet Take 1 tablet (20 mg) by mouth once daily at bedtime.      metoprolol succinate XL (Toprol-XL) 25 mg 24 hr tablet Take 1 tablet (25 mg) by mouth twice a day.      metoprolol tartrate (Lopressor) 75 mg tablet Take 1 tablet (75 mg) by mouth twice a day.      multivitamin tablet Take by mouth.      potassium chloride CR 20 mEq ER tablet Take 1 tablet (20 mEq) by mouth once daily.       No facility-administered encounter " medications on file as of 12/4/2023.       Physical Exam:  General: alert, oriented and in no acute distress  HEENT: NC/AT; EOMI; PERRLA, external ear is normal  Neck: supple; trachea midline; no masses; no JVD  Chest: lungs clear to auscultation bilaterally; no wheezing  CVS: regular rhythm, S1S2 normal, no murmurs  Abdomen: Soft, non-tender, non-distension, no organomegaly  Extremities: no clubbing/cyanosis/edema  Neuro: Grossly intact     Psychiatric: Normal mood and affect    Past Cardiology Results (Last 3 Years):  EKG:  No results found for this or any previous visit from the past 1095 days.    Echo:  2020:   1. The left ventricular systolic function is normal with a 50-55% estimated ejection fraction.   2. The left atrium is severely dilated.   3. Patient is bradycardic at the time of the echocardiogram.     Cath:  No results found for this or any previous visit from the past 1095 days.    CV NCDR CATHPCI V5 COLLECTION FORM   Stress Test:  No results found for this or any previous visit from the past 1095 days.    Cardiac Imaging:  No results found for this or any previous visit from the past 1095 days.       Assessment/Plan   In summary, Mrs. Shearer is a 78-year-old female with a medical history of S/P PPM (2023), hypertension, hyperlipidemia, diabetes, atrial fibrillation on Eliquis who was admitted and seen by me at [facility] on 5/17/2022 for runs of atrial fibrillation with ambulation.    Assessment    # Heart Failure / S/P PPM (11/29/2023)  - Patient returns today complaining of worsening SOB, with edema in both legs after PPM placement   - Last echo from 2020 with normal LVEF  - Will increase Bumex to 2mg daily  - Will restart Lisinopril 40mg daily  - Keep Metoprolol tartrate 75mg BID  - Follow up with echocardiogram.    # Paroxysmal atrial fibrillation  - Elevated [id number] score of 3 which implies higher risk for thromboembolic events yearly, therefore should continue on stroke  prophylaxis with DOAC - Eliquis 5mg BID.  - Patient is currently asymptomatic on rate control strategy with Metoprolol and Eliquis.   - Prior EKG showing atrial fibrillation.  - Prior echocardiogram shows normal left ventricle ejection fraction  - We had a thorough conversation about the triggers for atrial fibrillation, including alcohol, caffeine and chocolate.  - Counseled to exercise for better conditioning, for losing weight and to lower the baseline heart rate.  - Keep Metoprolol tartrate 75mg BID.     # Hypertension  - Hypertensive on current regimen  - Keep Amlodipine 5mg daily, Metoprolol tartrate 75mg BID  - Restart Lisinopril 40mg daily    # Diabetes & Hyperlipidemia  - Keep Lovastatin 20mg daily    # Hypothyroidism  - Keep Levothyroxine 137mcg daily    # S/P PPM (11/29/2023)  - Recent PPM placement  - EKG on Afib     This note was transcribed using the Dragon Dictation system. There may be grammatical, punctuation, or verbiage errors that occur with voice recognition programs.    Counseling greater than 50% of visit regarding all cardiac issues.    Thank you for allowing me to participate in the care of this patient. Please do not hesitate to contact me with any further questions or concerns.    Benjamin Baker MD  Cardiology

## 2023-12-12 ENCOUNTER — TELEPHONE (OUTPATIENT)
Dept: PRIMARY CARE | Facility: CLINIC | Age: 78
End: 2023-12-12
Payer: COMMERCIAL

## 2023-12-12 DIAGNOSIS — E11.9 TYPE 2 DIABETES MELLITUS WITHOUT COMPLICATION, WITHOUT LONG-TERM CURRENT USE OF INSULIN (MULTI): ICD-10-CM

## 2023-12-12 RX ORDER — FLASH GLUCOSE SENSOR
KIT MISCELLANEOUS
Qty: 1 EACH | Refills: 2 | Status: SHIPPED | OUTPATIENT
Start: 2023-12-12 | End: 2024-02-09 | Stop reason: SDUPTHER

## 2023-12-12 NOTE — TELEPHONE ENCOUNTER
Please send to [facility]    FreeStyle Alannah sensor system (FreeStyle Alannah 2 Sensor) kit [[id number]]    Order Details  Dose, Route, Frequency: As Directed   Dispense Quantity: 1 each Refills: 2          Sig: Use as instructed         Start Date: 09/15/23 End Date: --   Written Date: 09/15/23

## 2023-12-15 DIAGNOSIS — E11.9 TYPE 2 DIABETES MELLITUS WITHOUT COMPLICATION, WITHOUT LONG-TERM CURRENT USE OF INSULIN (MULTI): ICD-10-CM

## 2023-12-18 RX ORDER — INSULIN LISPRO 100 [IU]/ML
INJECTION, SUSPENSION SUBCUTANEOUS
Qty: 18 ML | Refills: 0 | OUTPATIENT
Start: 2023-12-18

## 2023-12-21 RX ORDER — INSULIN LISPRO 100 [IU]/ML
INJECTION, SUSPENSION SUBCUTANEOUS
Qty: 9 ML | Refills: 3 | Status: SHIPPED | OUTPATIENT
Start: 2023-12-21 | End: 2024-06-10 | Stop reason: SDUPTHER

## 2023-12-21 NOTE — TELEPHONE ENCOUNTER
12/21/23  Patient's RX for insulin lispro protamin-lispro ( Humalog mix 75-25 KwikPen) 100 unit/ml (75/25) injection is being denied at [facility].

## 2023-12-21 NOTE — TELEPHONE ENCOUNTER
Medication Refill Request    Medication:  insulin lispro    Pharmacy:  [facility]    Last OV:  9/1/23  Upcoming appointment:  no upcoming appt scheduled    ORDER PENDED, prior auth may be needed

## 2023-12-21 NOTE — TELEPHONE ENCOUNTER
Patient called again, asking for a refill on Insulin, asking for a call back because the RX needs to be for 15.   Please call back ASAP

## 2024-02-09 ENCOUNTER — TELEPHONE (OUTPATIENT)
Dept: PRIMARY CARE | Facility: CLINIC | Age: 79
End: 2024-02-09
Payer: COMMERCIAL

## 2024-02-09 DIAGNOSIS — E11.9 TYPE 2 DIABETES MELLITUS WITHOUT COMPLICATION, WITHOUT LONG-TERM CURRENT USE OF INSULIN (MULTI): ICD-10-CM

## 2024-02-09 RX ORDER — FLASH GLUCOSE SENSOR
KIT MISCELLANEOUS
Qty: 1 EACH | Refills: 2 | Status: SHIPPED | OUTPATIENT
Start: 2024-02-09 | End: 2024-04-12 | Stop reason: SDUPTHER

## 2024-02-09 NOTE — TELEPHONE ENCOUNTER
02/09/24  Patient is requesting refill for FreeStyle Alannah 2 sensor kit to be sent to [facility]

## 2024-02-20 DIAGNOSIS — E03.9 HYPOTHYROIDISM, UNSPECIFIED TYPE: Primary | ICD-10-CM

## 2024-02-20 RX ORDER — LEVOTHYROXINE SODIUM 137 UG/1
137 TABLET ORAL DAILY
Qty: 30 TABLET | Refills: 11 | Status: SHIPPED | OUTPATIENT
Start: 2024-02-20

## 2024-02-22 DIAGNOSIS — I10 ESSENTIAL HYPERTENSION: ICD-10-CM

## 2024-02-22 RX ORDER — AMLODIPINE BESYLATE 5 MG/1
5 TABLET ORAL DAILY
Qty: 90 TABLET | Refills: 0 | Status: SHIPPED | OUTPATIENT
Start: 2024-02-22 | End: 2024-04-25 | Stop reason: WASHOUT

## 2024-02-23 ENCOUNTER — APPOINTMENT (OUTPATIENT)
Dept: CARDIOLOGY | Facility: HOSPITAL | Age: 79
End: 2024-02-23
Payer: COMMERCIAL

## 2024-02-23 ENCOUNTER — HOSPITAL ENCOUNTER (OUTPATIENT)
Dept: CARDIOLOGY | Facility: HOSPITAL | Age: 79
Discharge: HOME | End: 2024-02-23
Payer: COMMERCIAL

## 2024-02-23 VITALS
SYSTOLIC BLOOD PRESSURE: 82 MMHG | OXYGEN SATURATION: 96 % | DIASTOLIC BLOOD PRESSURE: 52 MMHG | BODY MASS INDEX: 38.55 KG/M2 | HEART RATE: 69 BPM | WEIGHT: 204 LBS

## 2024-02-23 DIAGNOSIS — I48.91 UNSPECIFIED ATRIAL FIBRILLATION (MULTI): ICD-10-CM

## 2024-02-23 DIAGNOSIS — I48.21 PERMANENT ATRIAL FIBRILLATION (MULTI): ICD-10-CM

## 2024-02-23 LAB
AORTIC VALVE MEAN GRADIENT: 3 MMHG
AORTIC VALVE PEAK VELOCITY: 1.12 M/S
AV PEAK GRADIENT: 5 MMHG
AVA (PEAK VEL): 1.4 CM2
AVA (VTI): 0.95 CM2
EJECTION FRACTION APICAL 4 CHAMBER: 53.8
EJECTION FRACTION: 52 %
LEFT ATRIUM VOLUME AREA LENGTH INDEX BSA: 15.9 ML/M2
LEFT VENTRICLE INTERNAL DIMENSION DIASTOLE: 5.01 CM (ref 3.5–6)
LEFT VENTRICULAR OUTFLOW TRACT DIAMETER: 1.5 CM
MITRAL VALVE E/A RATIO: 2.17
RIGHT VENTRICLE FREE WALL PEAK S': 7.18 CM/S
RIGHT VENTRICLE PEAK SYSTOLIC PRESSURE: 57.2 MMHG
TRICUSPID ANNULAR PLANE SYSTOLIC EXCURSION: 1.5 CM

## 2024-02-23 PROCEDURE — 93306 TTE W/DOPPLER COMPLETE: CPT | Performed by: STUDENT IN AN ORGANIZED HEALTH CARE EDUCATION/TRAINING PROGRAM

## 2024-02-23 PROCEDURE — 2500000004 HC RX 250 GENERAL PHARMACY W/ HCPCS (ALT 636 FOR OP/ED): Performed by: STUDENT IN AN ORGANIZED HEALTH CARE EDUCATION/TRAINING PROGRAM

## 2024-02-23 PROCEDURE — 93306 TTE W/DOPPLER COMPLETE: CPT

## 2024-02-23 RX ADMIN — PERFLUTREN 2 ML OF DILUTION: 6.52 INJECTION, SUSPENSION INTRAVENOUS at 10:24

## 2024-02-27 ENCOUNTER — OFFICE VISIT (OUTPATIENT)
Dept: CARDIOLOGY | Facility: CLINIC | Age: 79
End: 2024-02-27
Payer: COMMERCIAL

## 2024-02-27 VITALS
HEART RATE: 72 BPM | DIASTOLIC BLOOD PRESSURE: 72 MMHG | OXYGEN SATURATION: 98 % | HEIGHT: 61 IN | BODY MASS INDEX: 39.46 KG/M2 | WEIGHT: 209 LBS | SYSTOLIC BLOOD PRESSURE: 112 MMHG

## 2024-02-27 DIAGNOSIS — I50.32 CHRONIC DIASTOLIC HEART FAILURE (MULTI): Primary | ICD-10-CM

## 2024-02-27 PROCEDURE — 1160F RVW MEDS BY RX/DR IN RCRD: CPT | Performed by: STUDENT IN AN ORGANIZED HEALTH CARE EDUCATION/TRAINING PROGRAM

## 2024-02-27 PROCEDURE — 1157F ADVNC CARE PLAN IN RCRD: CPT | Performed by: STUDENT IN AN ORGANIZED HEALTH CARE EDUCATION/TRAINING PROGRAM

## 2024-02-27 PROCEDURE — 99214 OFFICE O/P EST MOD 30 MIN: CPT | Performed by: STUDENT IN AN ORGANIZED HEALTH CARE EDUCATION/TRAINING PROGRAM

## 2024-02-27 PROCEDURE — 1036F TOBACCO NON-USER: CPT | Performed by: STUDENT IN AN ORGANIZED HEALTH CARE EDUCATION/TRAINING PROGRAM

## 2024-02-27 PROCEDURE — 1159F MED LIST DOCD IN RCRD: CPT | Performed by: STUDENT IN AN ORGANIZED HEALTH CARE EDUCATION/TRAINING PROGRAM

## 2024-02-27 NOTE — PROGRESS NOTES
Chief Complaint   Patient presents with    3 MONTH FOLLOW UP      SOB     HPI:  I was requested by Dr. Farr to evaluate this patient in consultation for cardiac assessment.     Patient 78-year-old female with a medical history of S/P PPM (11/2023), hypertension, hyperlipidemia, diabetes, atrial fibrillation on Eliquis who was admitted and seen by me at [facility] on 5/17/2022 for runs of atrial fibrillation with ambulation. Patient still looks overloaded.     Problem #1 paroxysmal atrial fibrillation  - On Eliquis 5 mg twice daily.  -She denies any symptoms. She denies any palpitations/shortness of breath with exertion/chest pain/orthopnea PND/lower extremity edema. Also says no to dizziness/lightheadedness/syncopal episodes.  -No bleeding complications on the Eliquis.     Problem #2 hypertension  -Currently on lisinopril 40 mg daily and amlodipine 5 mg daily  -Normotensive in clinic today     Problem #3 diabetes concurrent with hyperlipidemia  -On lovastatin 20 mg LDL was 51 mg/dL in 2022.      Problem #4 S/P PPM (11/29/2023)  - Recent PPM placement  - EKG on Afib       Past Medical History  Past Medical History:   Diagnosis Date    Encounter for full-term uncomplicated delivery     Normal vaginal delivery    Encounter for gynecological examination (general) (routine) without abnormal findings 09/01/2020    Encounter for routine gynecological examination    Other conditions influencing health status     Menstruation    Other fecal abnormalities     Stool guaiac positive    Personal history of other diseases of the circulatory system     History of CHF (congestive heart failure)    Personal history of other medical treatment 10/06/2021    H/O mammogram       Past Surgical History  Past Surgical History:   Procedure Laterality Date    CT ABDOMEN PELVIS ANGIOGRAM W AND/OR WO IV CONTRAST  07/21/2023    CT ABDOMEN PELVIS ANGIOGRAM W AND/OR WO IV CONTRAST 7/21/2023 [facility] CT    INSERT / REPLACE /  REMOVE PACEMAKER      OTHER SURGICAL HISTORY  10/17/2019    Throat surgery    OTHER SURGICAL HISTORY  10/17/2019    Cholecystectomy    OTHER SURGICAL HISTORY  12/12/2019    Shoulder surgery    OTHER SURGICAL HISTORY  02/19/2020    Knee surgery    OTHER SURGICAL HISTORY  09/22/2020    Knee replacement    OTHER SURGICAL HISTORY  12/12/2019    Carpal tunnel surgery    OTHER SURGICAL HISTORY  12/12/2019    Thyroidectomy    OTHER SURGICAL HISTORY  12/12/2019    Cataract surgery       Past Family History  Family History   Problem Relation Name Age of Onset    Diabetes Mother      Hypertension Mother      Diabetes Father      Hypertension Father         Allergy History  Allergies   Allergen Reactions    Fenofibrate Hives    Oxycodone-Acetaminophen Unknown     vomitting    Aspirin Rash    Metformin Rash       Past Social History  Social History     Socioeconomic History    Marital status:      Spouse name: None    Number of children: None    Years of education: None    Highest education level: None   Occupational History    None   Tobacco Use    Smoking status: Never    Smokeless tobacco: Never   Vaping Use    Vaping Use: Never used   Substance and Sexual Activity    Alcohol use: Never    Drug use: Never    Sexual activity: None   Other Topics Concern    None   Social History Narrative    None     Social Determinants of Health     Financial Resource Strain: Not on file   Food Insecurity: Not on file   Transportation Needs: Not on file   Physical Activity: Not on file   Stress: Not on file   Social Connections: Not on file   Intimate Partner Violence: Not on file   Housing Stability: Not on file       Social History     Tobacco Use   Smoking Status Never   Smokeless Tobacco Never       Review of Systems:  Constitutional: Denies any fever or chills  Eyes: Denies any eye pain or blurry vision  ENT: Denies any ear pain or hearing loss  Cardiovascular: The heart rate is not slow, the heart rate is not fast  Respiratory:  "Denies any asthma/wheezing  Gastrointestinal: Denies any daniela colored stools or fatty food intolerance  Genitourinary: Denies any blood in the urine or pelvic pain  Musculoskeletal: Denies any swelling in the joints or difficulty walking  Skin: Denies any skin lumps or skin lesions  Neurological: Denies any dizziness/tingling     Objective Data:  Last Recorded Vitals:  Vitals:    02/27/24 1302   BP: 112/72   Pulse: 72   SpO2: 98%   Weight: 94.8 kg (209 lb)   Height: 1.549 m (5' 1\")       Last Labs:  CBC - 7/21/2023: 11:20 AM  7.5 13.0 128    40.2      CMP - 9/1/2023:  7:22 AM  8.8 7.7 29 --- 1.5   _ 4.3 15 270      PTT - 7/21/2023: 11:20 AM  1.7   19.3 42     TROPHS   Date/Time Value Ref Range Status   07/21/2023 11:20 AM 26 0 - 13 ng/L Final     Comment:     .  Less than 99th percentile of normal range cutoff-  Female and children under 18 years old <14 ng/L; Male <21 ng/L: Negative  Repeat testing should be performed if clinically indicated.   .  Female and children under 18 years old 14-50 ng/L; Male 21-50 ng/L:  Consistent with possible cardiac damage and possible increased clinical   risk. Serial measurements may help to assess extent of myocardial damage.   .  >50 ng/L: Consistent with cardiac damage, increased clinical risk and  myocardial infarction. Serial measurements may help assess extent of   myocardial damage.   .   NOTE: Children less than 1 year old may have higher baseline troponin   levels and results should be interpreted in conjunction with the overall   clinical context.   .  NOTE: Troponin I testing is performed using a different   testing methodology at [facility] than at other   [facility]. Direct result comparisons should only   be made within the same method.       BNP   Date/Time Value Ref Range Status   07/21/2023 11:20 AM 1,235 0 - 99 pg/mL Final     Comment:     .  <100 pg/mL - Heart failure unlikely  100-299 pg/mL - Intermediate probability of acute heart  .         "       failure exacerbation. Correlate with clinical  .               context and patient history.    >=300 pg/mL - Heart Failure likely. Correlate with clinical  .               context and patient history.  BNP testing is performed using different testing   methodology at [facility] than at other   [facility] hospitals. Direct result comparisons should   only be made within the same method.     05/19/2021 08:38  0 - 99 pg/mL Final     Comment:     .  <100 pg/mL - Heart failure unlikely  100-299 pg/mL - Intermediate probability of acute heart  .               failure exacerbation. Correlate with clinical  .               context and patient history.    >=300 pg/mL - Heart Failure likely. Correlate with clinical  .               context and patient history.  BNP testing is performed using different testing   methodology at [facility] than at other   [facility] hospitals. Direct result comparisons should   only be made within the same method.       HGBA1C   Date/Time Value Ref Range Status   06/23/2023 07:25 AM 6.2 % Final     Comment:          Diagnosis of Diabetes-Adults   Non-Diabetic: < or = 5.6%   Increased risk for developing diabetes: 5.7-6.4%   Diagnostic of diabetes: > or = 6.5%  .       Monitoring of Diabetes                Age (y)     Therapeutic Goal (%)   Adults:          >18           <7.0   Pediatrics:    13-18           <7.5                   7-12           <8.0                   0- 6            7.5-8.5   American Diabetes Association. Diabetes Care 33(S1), Jan 2010.   01/11/2023 08:30 AM 6.6 % Final     Comment:          Diagnosis of Diabetes-Adults   Non-Diabetic: < or = 5.6%   Increased risk for developing diabetes: 5.7-6.4%   Diagnostic of diabetes: > or = 6.5%  .       Monitoring of Diabetes                Age (y)     Therapeutic Goal (%)   Adults:          >18           <7.0   Pediatrics:    13-18           <7.5                   7-12           <8.0                   0-  "6            7.5-8.5   American Diabetes Association. Diabetes Care 33(S1), Jan 2010.     12/12/2021 02:37 AM 10.8 4.0 - 5.6 % Final   06/20/2021 06:05 AM 7.1 4.0 - 5.6 % Final     VLDL   Date/Time Value Ref Range Status   06/23/2023 07:25 AM 22 0 - 40 mg/dL Final   07/20/2022 07:25 AM 25 0 - 40 mg/dL Final   06/01/2021 12:05 PM 57 0 - 40 mg/dL Final        Patient Medications:  Outpatient Encounter Medications as of 2/27/2024   Medication Sig Dispense Refill    acetaminophen 325 mg chewable tablet Chew.      allopurinol (Zyloprim) 300 mg tablet Take 1 tablet (300 mg) by mouth once daily.      amLODIPine (Norvasc) 5 mg tablet Take 1 tablet (5 mg) by mouth once daily. 90 tablet 0    apixaban (Eliquis) 5 mg tablet Take 1 tablet (5 mg) by mouth 2 times a day. 180 tablet 1    aspirin 81 mg EC tablet Take 1 tablet (81 mg) by mouth once daily.      BD Ultra-Fine Micro Pen Needle 32 gauge x 1/4\" needle Up to three times daily 100 each 3    bumetanide (Bumex) 1 mg tablet Take 1 tablet (1 mg) by mouth once daily. 90 tablet 3    flash glucose sensor kit (FreeStyle Alannah 2 Sensor) kit Use as instructed 1 each 2    FreeStyle Alannah reader (FreeStyle Alannah 2 Darling) misc Use as instructed 1 each 1    FreeStyle Alannah sensor system (FreeStyle Alannah 14 Day Sensor) kit Use as instructed 2 each 2    insulin lispro protamin-lispro (HumaLOG Mix 75-25 KwikPen) 100 unit/mL (75-25) injection INJECT 15 UNITS SUBCUTANEOUSLY IN THE MORNING and 15 UNITS NIGHTLY 9 mL 3    levothyroxine (Synthroid, Levoxyl) 137 mcg tablet Take 1 tablet (137 mcg) by mouth once daily. 30 tablet 11    lisinopril 40 mg tablet Take 1 tablet (40 mg) by mouth once daily. 90 tablet 3    loratadine (Claritin) 10 mg tablet Take by mouth.      lovastatin (Mevacor) 20 mg tablet Take 1 tablet (20 mg) by mouth once daily at bedtime.      metoprolol tartrate (Lopressor) 75 mg tablet Take 1 tablet (75 mg) by mouth 2 times a day. 30 tablet 11    multivitamin tablet Take by " mouth.      potassium chloride CR 20 mEq ER tablet Take 1 tablet (20 mEq) by mouth once daily.      [DISCONTINUED] amLODIPine (Norvasc) 5 mg tablet Take 1 tablet by mouth once daily 90 tablet 0     No facility-administered encounter medications on file as of 2/27/2024.       Physical Exam:  General: alert, oriented and in no acute distress  HEENT: NC/AT; EOMI; PERRLA, external ear is normal  Neck: supple; trachea midline; no masses; no JVD  Chest: lungs clear to auscultation bilaterally; no wheezing  CVS: regular rhythm, S1S2 normal, no murmurs  Abdomen: Soft, non-tender, non-distension, no organomegaly  Extremities: Edema 2+/3+ LE b/l  Neuro: Grossly intact     Psychiatric: Normal mood and affect    Past Cardiology Results (Last 3 Years):  EKG:  ECG 12 lead (Clinic Performed) 12/04/2023    Echo:    Echo:  2020:   1. The left ventricular systolic function is normal with a 50-55% estimated ejection fraction.   2. The left atrium is severely dilated.   3. Patient is bradycardic at the time of the echocardiogram.     Cath:  No results found for this or any previous visit from the past 1095 days.     CV NCDR CATHPCI V5 COLLECTION FORM   Stress Test:  No results found for this or any previous visit from the past 1095 days.     Cardiac Imaging:  No results found for this or any previous visit from the past 1095 days.    Echo Results:  Transthoracic Echo (TTE) Complete 02/23/2024    [place] [address]  Phone [phone], Fax [phone]    TRANSTHORACIC ECHOCARDIOGRAM REPORT      Patient Name:      OG Hernandez Physician:    [id number] Adolph Paul MD  Study Date:        2/23/2024            Ordering Provider:    [id number] JENNIFER SELLERS  MRN/PID:           [id number]             Fellow:  Accession#:        [id number]         Nurse:                Stacia Jensen RN  Date of Birth/Age: 1945 / 78 years  Sonographer:          Quique  Aneudy [facility]  Gender:            F                    Additional Staff:  Height:            154.94 cm            Admit Date:  Weight:            92.53 kg             Admission Status:     Outpatient  BSA / BMI:         1.91 m2 / 38.55      Department Location:  [facility] Echo [facility]  kg/m2  Blood Pressure: 82 /52 mmHg    Study Type:    TRANSTHORACIC ECHO (TTE) COMPLETE  Diagnosis/ICD: Unspecified atrial fibrillation-I48.91  CPT Codes:     Echo Complete w Full [id number]  Study Detail: The following Echo studies were performed: 2D, M-Mode, Doppler and  color flow. Definity used as a contrast agent for endocardial  border definition. Total contrast used for this procedure was  2.00cc mL via IV push.      PHYSICIAN INTERPRETATION:  Left Ventricle: Left ventricular systolic function is normal, with an estimated ejection fraction of 55%. There are no regional wall motion abnormalities. The left ventricular cavity size is normal. Left ventricular diastolic filling was indeterminate.  Left Atrium: The left atrium is mildly dilated.  Right Ventricle: The right ventricle is normal in size. There is reduced right ventricular systolic function.  Right Atrium: The right atrium is normal in size.  Aortic Valve: The aortic valve was not well visualized. There is no evidence of aortic valve regurgitation. The peak instantaneous gradient of the aortic valve is 5.0 mmHg. The mean gradient of the aortic valve is 3.0 mmHg.  Mitral Valve: The mitral valve is normal in structure. There is mild mitral valve regurgitation.  Tricuspid Valve: The tricuspid valve is structurally normal. Tricuspid regurgitation was not assessed.  Pulmonic Valve: The pulmonic valve is not well visualized. There is moderate pulmonic valve regurgitation.  Pericardium: There is no pericardial effusion noted.  Aorta: The aortic root is normal.  Systemic Veins: The inferior vena cava appears to be of normal size. There is IVC inspiratory collapse greater than  50%.      CONCLUSIONS:  1. Left ventricular systolic function is normal with a 55% estimated ejection fraction.  2. There is reduced right ventricular systolic function.  3. Moderate pulmonic valve regurgitation.  4. Calculated pulmonary artery systolic pressure is 46 mmHg suggestive of pulmonary hypertension.  5. Poorly visualized anatomical structures due to suboptimal image quality.    QUANTITATIVE DATA SUMMARY:  2D MEASUREMENTS:  Normal Ranges:  Ao Root d:     2.60 cm    (2.0-3.7cm)  LAs:           4.60 cm    (2.7-4.0cm)  IVSd:          1.31 cm    (0.6-1.1cm)  LVPWd:         1.00 cm    (0.6-1.1cm)  LVIDd:         5.01 cm    (3.9-5.9cm)  LVIDs:         3.96 cm  LV Mass Index: 116.6 g/m2  LV % FS        21.0 %    LA VOLUME:  Normal Ranges:  LA Vol A4C:        24.6 ml    (22+/-6mL/m2)  LA Vol A2C:        35.9 ml  LA Vol BP:         30.3 ml  LA Vol Index A4C:  12.9ml/m2  LA Vol Index A2C:  18.9 ml/m2  LA Vol Index BP:   15.9 ml/m2  LA Area A4C:       11.9 cm2  LA Area A2C:       14.7 cm2  LA Major Axis A4C: 4.9 cm  LA Major Axis A2C: 5.1 cm  LA Volume Index:   12.6 ml/m2  LA Vol A4C:        24.0 ml  LA Vol A2C:        36.0 ml    LV SYSTOLIC FUNCTION BY 2D PLANIMETRY (MOD):  Normal Ranges:  EF-A4C View: 53.8 % (>=55%)  EF-A2C View: 54.1 %  EF-Biplane:  52.3 %    LV DIASTOLIC FUNCTION:  Normal Ranges:  MV Peak E:    1.13 m/s (0.7-1.2 m/s)  MV Peak A:    0.52 m/s (0.42-0.7 m/s)  E/A Ratio:    2.17     (1.0-2.2)  MV lateral e' 0.09 m/s  MV medial e'  0.07 m/s    MITRAL VALVE:  Normal Ranges:  MV DT: 211 msec (150-240msec)    AORTIC VALVE:  Normal Ranges:  AoV Vmax:                1.12 m/s (<=1.7m/s)  AoV Peak P.0 mmHg (<20mmHg)  AoV Mean PG:             3.0 mmHg (1.7-11.5mmHg)  LVOT Max Dwayne:            0.89 m/s (<=1.1m/s)  AoV VTI:                 29.50 cm (18-25cm)  LVOT VTI:                15.80 cm  LVOT Diameter:           1.50 cm  (1.8-2.4cm)  AoV Area, VTI:           0.95 cm2 (2.5-5.5cm2)  AoV  Area,Vmax:           1.40 cm2 (2.5-4.5cm2)  AoV Dimensionless Index: 0.54      RIGHT VENTRICLE:  TAPSE: 15.2 mm  RV s'  0.07 m/s    TRICUSPID VALVE/RVSP:  Normal Ranges:  Peak TR Velocity: 3.68 m/s  RV Syst Pressure: 57.2 mmHg (< 30mmHg)    PULMONIC VALVE:  Normal Ranges:  PV Accel Time: 81 msec  (>120ms)  PIEDV:         1.19 m/s  PADP:          8.7 mmHg      [id number] Adolph Paul MD  Electronically signed on 2/23/2024 at 12:55:07 PM        ** Final **     Cath:  No results found for this or any previous visit from the past 1095 days.    CV NCDR CATHPCI V5 COLLECTION FORM   Stress Test:  No results found for this or any previous visit from the past 1095 days.    Cardiac Imaging:  No results found for this or any previous visit from the past 1095 days.       Assessment/Plan   In summary, Mrs. Shearer is a 78-year-old female with a medical history of S/P PPM (2023), hypertension, hyperlipidemia, diabetes, atrial fibrillation on Eliquis who was admitted and seen by me at [facility] on 5/17/2022 for runs of atrial fibrillation with ambulation.     Assessment     # Heart Failure with preserved LVEF (LVEF ~55%) / S/P PPM (11/29/2023)  - Patient returns today complaining of worsening SOB, with edema in both legs after PPM placement   - Last echo from 2020 with normal LVEF  - New echocardiogram (02/23/2024):   1. Left ventricular systolic function is normal with a 55% estimated ejection fraction.   2. There is reduced right ventricular systolic function.   3. Moderate pulmonic valve regurgitation.   4. Calculated pulmonary artery systolic pressure is 46 mmHg suggestive of pulmonary hypertension.   5. Poorly visualized anatomical structures due to suboptimal image quality.  - Patient have not started higher dose of Bumex as suggested before.  - Will increase Bumex to 2mg daily   - Keep Lisinopril 40mg daily  - Keep Metoprolol tartrate 75mg BID  - Follow up in 3 months with labs.     # Paroxysmal atrial  fibrillation  - Elevated [id number] score of 3 which implies higher risk for thromboembolic events yearly, therefore should continue on stroke prophylaxis with DOAC - Eliquis 5mg BID.  - Patient is currently asymptomatic on rate control strategy with Metoprolol and Eliquis.   - Prior EKG showing atrial fibrillation.  - Prior echocardiogram shows normal left ventricle ejection fraction  - We had a thorough conversation about the triggers for atrial fibrillation, including alcohol, caffeine and chocolate.  - Counseled to exercise for better conditioning, for losing weight and to lower the baseline heart rate.  - Keep Metoprolol tartrate 75mg BID.     # Hypertension  - Hypertensive on current regimen  - Keep Amlodipine 5mg daily, Metoprolol tartrate 75mg BID  - Restart Lisinopril 40mg daily     # Diabetes & Hyperlipidemia  - Keep Lovastatin 20mg daily     # Hypothyroidism  - Keep Levothyroxine 137mcg daily     # S/P PPM (11/29/2023)  - Recent PPM placement  - EKG on Afib      This note was transcribed using the Dragon Dictation system. There may be grammatical, punctuation, or verbiage errors that occur with voice recognition programs.     Counseling greater than 50% of visit regarding all cardiac issues.     Thank you for allowing me to participate in the care of this patient. Please do not hesitate to contact me with any further questions or concerns.     Benjamin Baker MD  Cardiology

## 2024-02-29 ENCOUNTER — APPOINTMENT (OUTPATIENT)
Dept: CARDIOLOGY | Facility: CLINIC | Age: 79
End: 2024-02-29
Payer: COMMERCIAL

## 2024-03-14 DIAGNOSIS — I10 HYPERTENSION, UNSPECIFIED TYPE: ICD-10-CM

## 2024-03-14 RX ORDER — LISINOPRIL 40 MG/1
40 TABLET ORAL DAILY
Qty: 90 TABLET | Refills: 3 | Status: CANCELLED | OUTPATIENT
Start: 2024-03-14

## 2024-03-15 NOTE — TELEPHONE ENCOUNTER
Lionel notified that Dr. Baker refilled in December for one year. Advised to check with pharmacy.

## 2024-03-16 DIAGNOSIS — I10 HYPERTENSION, UNSPECIFIED TYPE: ICD-10-CM

## 2024-03-18 RX ORDER — LISINOPRIL 40 MG/1
40 TABLET ORAL DAILY
Qty: 90 TABLET | Refills: 0 | Status: SHIPPED | OUTPATIENT
Start: 2024-03-18 | End: 2024-06-07 | Stop reason: SDUPTHER

## 2024-04-12 ENCOUNTER — TELEPHONE (OUTPATIENT)
Dept: PRIMARY CARE | Facility: CLINIC | Age: 79
End: 2024-04-12
Payer: COMMERCIAL

## 2024-04-12 DIAGNOSIS — E11.9 TYPE 2 DIABETES MELLITUS WITHOUT COMPLICATION, WITHOUT LONG-TERM CURRENT USE OF INSULIN (MULTI): ICD-10-CM

## 2024-04-12 RX ORDER — FLASH GLUCOSE SENSOR
KIT MISCELLANEOUS
Refills: 2 | OUTPATIENT
Start: 2024-04-12

## 2024-04-15 DIAGNOSIS — E11.9 TYPE 2 DIABETES MELLITUS WITHOUT COMPLICATION, WITHOUT LONG-TERM CURRENT USE OF INSULIN (MULTI): ICD-10-CM

## 2024-04-15 RX ORDER — FLASH GLUCOSE SENSOR
KIT MISCELLANEOUS
Refills: 0 | OUTPATIENT
Start: 2024-04-15

## 2024-04-15 RX ORDER — FLASH GLUCOSE SENSOR
KIT MISCELLANEOUS
Qty: 1 EACH | Refills: 0 | Status: SHIPPED | OUTPATIENT
Start: 2024-04-15 | End: 2024-05-17 | Stop reason: SDUPTHER

## 2024-04-18 DIAGNOSIS — E22.2 SIADH (SYNDROME OF INAPPROPRIATE ADH PRODUCTION) (MULTI): ICD-10-CM

## 2024-04-24 ENCOUNTER — LAB (OUTPATIENT)
Dept: LAB | Facility: LAB | Age: 79
End: 2024-04-24
Payer: COMMERCIAL

## 2024-04-24 DIAGNOSIS — E22.2 SIADH (SYNDROME OF INAPPROPRIATE ADH PRODUCTION) (MULTI): ICD-10-CM

## 2024-04-24 LAB
ALBUMIN SERPL BCP-MCNC: 4.3 G/DL (ref 3.4–5)
ALP SERPL-CCNC: 182 U/L (ref 33–136)
ALT SERPL W P-5'-P-CCNC: 20 U/L (ref 7–45)
ANION GAP SERPL CALC-SCNC: 15 MMOL/L (ref 10–20)
AST SERPL W P-5'-P-CCNC: 36 U/L (ref 9–39)
BILIRUB SERPL-MCNC: 1.3 MG/DL (ref 0–1.2)
BUN SERPL-MCNC: 16 MG/DL (ref 6–23)
CALCIUM SERPL-MCNC: 9.6 MG/DL (ref 8.6–10.3)
CHLORIDE SERPL-SCNC: 97 MMOL/L (ref 98–107)
CO2 SERPL-SCNC: 35 MMOL/L (ref 21–32)
CREAT SERPL-MCNC: 1.33 MG/DL (ref 0.5–1.05)
EGFRCR SERPLBLD CKD-EPI 2021: 41 ML/MIN/1.73M*2
ERYTHROCYTE [DISTWIDTH] IN BLOOD BY AUTOMATED COUNT: 13.2 % (ref 11.5–14.5)
GLUCOSE SERPL-MCNC: 152 MG/DL (ref 74–99)
HCT VFR BLD AUTO: 45 % (ref 36–46)
HGB BLD-MCNC: 14.6 G/DL (ref 12–16)
MCH RBC QN AUTO: 32.7 PG (ref 26–34)
MCHC RBC AUTO-ENTMCNC: 32.4 G/DL (ref 32–36)
MCV RBC AUTO: 101 FL (ref 80–100)
NRBC BLD-RTO: 0 /100 WBCS (ref 0–0)
PLATELET # BLD AUTO: 195 X10*3/UL (ref 150–450)
POTASSIUM SERPL-SCNC: 4.2 MMOL/L (ref 3.5–5.3)
PROT SERPL-MCNC: 7.9 G/DL (ref 6.4–8.2)
RBC # BLD AUTO: 4.46 X10*6/UL (ref 4–5.2)
SODIUM SERPL-SCNC: 143 MMOL/L (ref 136–145)
WBC # BLD AUTO: 8.7 X10*3/UL (ref 4.4–11.3)

## 2024-04-24 PROCEDURE — 80053 COMPREHEN METABOLIC PANEL: CPT

## 2024-04-24 PROCEDURE — 85027 COMPLETE CBC AUTOMATED: CPT

## 2024-04-24 PROCEDURE — 36415 COLL VENOUS BLD VENIPUNCTURE: CPT

## 2024-04-25 ENCOUNTER — OFFICE VISIT (OUTPATIENT)
Dept: NEPHROLOGY | Facility: CLINIC | Age: 79
End: 2024-04-25
Payer: COMMERCIAL

## 2024-04-25 VITALS
BODY MASS INDEX: 39.01 KG/M2 | WEIGHT: 206.6 LBS | SYSTOLIC BLOOD PRESSURE: 114 MMHG | HEART RATE: 70 BPM | DIASTOLIC BLOOD PRESSURE: 68 MMHG | HEIGHT: 61 IN

## 2024-04-25 DIAGNOSIS — N18.32 TYPE 2 DIABETES MELLITUS WITH STAGE 3B CHRONIC KIDNEY DISEASE, WITH LONG-TERM CURRENT USE OF INSULIN (MULTI): ICD-10-CM

## 2024-04-25 DIAGNOSIS — I48.0 PAROXYSMAL ATRIAL FIBRILLATION (MULTI): ICD-10-CM

## 2024-04-25 DIAGNOSIS — Z79.4 TYPE 2 DIABETES MELLITUS WITH STAGE 3B CHRONIC KIDNEY DISEASE, WITH LONG-TERM CURRENT USE OF INSULIN (MULTI): ICD-10-CM

## 2024-04-25 DIAGNOSIS — I10 PRIMARY HYPERTENSION: ICD-10-CM

## 2024-04-25 DIAGNOSIS — E11.22 TYPE 2 DIABETES MELLITUS WITH STAGE 3B CHRONIC KIDNEY DISEASE, WITH LONG-TERM CURRENT USE OF INSULIN (MULTI): ICD-10-CM

## 2024-04-25 DIAGNOSIS — N18.32 STAGE 3B CHRONIC KIDNEY DISEASE (MULTI): Primary | ICD-10-CM

## 2024-04-25 PROBLEM — R60.9 PERIPHERAL EDEMA: Status: ACTIVE | Noted: 2024-04-25

## 2024-04-25 PROBLEM — E11.29 TYPE 2 DIABETES MELLITUS WITH KIDNEY COMPLICATION, WITH LONG-TERM CURRENT USE OF INSULIN (MULTI): Status: ACTIVE | Noted: 2024-04-25

## 2024-04-25 PROBLEM — R60.0 PERIPHERAL EDEMA: Status: ACTIVE | Noted: 2024-04-25

## 2024-04-25 PROCEDURE — 3078F DIAST BP <80 MM HG: CPT | Performed by: CLINICAL NURSE SPECIALIST

## 2024-04-25 PROCEDURE — 1159F MED LIST DOCD IN RCRD: CPT | Performed by: CLINICAL NURSE SPECIALIST

## 2024-04-25 PROCEDURE — 1160F RVW MEDS BY RX/DR IN RCRD: CPT | Performed by: CLINICAL NURSE SPECIALIST

## 2024-04-25 PROCEDURE — 99213 OFFICE O/P EST LOW 20 MIN: CPT | Performed by: CLINICAL NURSE SPECIALIST

## 2024-04-25 PROCEDURE — 1036F TOBACCO NON-USER: CPT | Performed by: CLINICAL NURSE SPECIALIST

## 2024-04-25 PROCEDURE — 1157F ADVNC CARE PLAN IN RCRD: CPT | Performed by: CLINICAL NURSE SPECIALIST

## 2024-04-25 PROCEDURE — 3074F SYST BP LT 130 MM HG: CPT | Performed by: CLINICAL NURSE SPECIALIST

## 2024-04-25 ASSESSMENT — ENCOUNTER SYMPTOMS
PSYCHIATRIC NEGATIVE: 1
GASTROINTESTINAL NEGATIVE: 1
NEUROLOGICAL NEGATIVE: 1
SHORTNESS OF BREATH: 1
ENDOCRINE NEGATIVE: 1
MUSCULOSKELETAL NEGATIVE: 1
CONSTITUTIONAL NEGATIVE: 1

## 2024-04-25 NOTE — ASSESSMENT & PLAN NOTE
Creatinine has increased slightly over the last year currently at 1.3, range has been 1-1.2 in the past, blood pressure is controlled, appears that diabetes is controlled, will check hemoglobin A1c, consider addition of SGLT2 with next office visit

## 2024-04-25 NOTE — PATIENT INSTRUCTIONS
Take bumex 1 mg twice a day (one at 7 and one at noon)  Increase potassium to one in the morning and 1/2 in the afternoon  Weigh daily  Labs in one week  Check blood pressure daily

## 2024-04-25 NOTE — PROGRESS NOTES
Subjective   Patient ID: Maira Shearer is a 78 y.o. female who presents for Follow-up (1 year ck/Review labs 4/24).  Patient being seen in follow-up for hyponatremia, chronic kidney disease and hypertension    Labs reviewed  Glucose 152  Sodium 143, potassium 4.2, chloride 97, bicarb 35  Renal function the BUN of 16 creatinine of 1.33, GFR is 41  H&H 14.6 and 45.0    She is having some lower extremity swelling, she also has some discomfort in her legs secondary to this  She is also complaining of her right arm being painful and difficult to move since her pacemaker has been put in  She denies any lightheadedness or dizziness  She is checking her blood pressures at home which are normally running from 106/66 to 131/72  She does have some shortness of breath with exertion        Review of Systems   Constitutional: Negative.    Respiratory:  Positive for shortness of breath.    Cardiovascular:  Positive for leg swelling.   Gastrointestinal: Negative.    Endocrine: Negative.    Genitourinary: Negative.    Musculoskeletal: Negative.    Skin: Negative.    Neurological: Negative.    Psychiatric/Behavioral: Negative.         Objective   Physical Exam  Vitals reviewed.   Constitutional:       Appearance: Normal appearance.   HENT:      Head: Normocephalic.   Cardiovascular:      Rate and Rhythm: Normal rate and regular rhythm.   Pulmonary:      Effort: Pulmonary effort is normal.      Breath sounds: Normal breath sounds.   Abdominal:      Palpations: Abdomen is soft.   Musculoskeletal:         General: Normal range of motion.      Right lower leg: Edema (1+) present.      Left lower leg: Edema (1+) present.   Skin:     General: Skin is warm and dry.   Neurological:      Mental Status: She is alert and oriented to person, place, and time.   Psychiatric:         Mood and Affect: Mood normal.         Behavior: Behavior normal.         Assessment/Plan   Problem List Items Addressed This Visit             ICD-10-CM    Primary  hypertension I10     Blood pressure is currently well-controlled and is also well-controlled at home, on lisinopril 40 mg daily and metoprolol 75 mg twice a day, amlodipine was on her list however it was not with the medications that she brought with her, does not need to take this at this time         Stage 3b chronic kidney disease (Multi) - Primary N18.32     Creatinine has increased slightly over the last year currently at 1.3, range has been 1-1.2 in the past, blood pressure is controlled, appears that diabetes is controlled, will check hemoglobin A1c, consider addition of SGLT2 with next office visit         Relevant Orders    Basic metabolic panel    Uric acid    Urinalysis with Reflex Microscopic    Albumin , Urine Random    Follow Up In Nephrology    Hemoglobin A1c    Type 2 diabetes mellitus with kidney complication, with long-term current use of insulin (Multi) E11.29, Z79.4     Currently on insulin, will consider addition of SGLT2 after diuresis         Relevant Orders    Hemoglobin A1c     Other Visit Diagnoses         Codes    Paroxysmal atrial fibrillation (Multi)     I48.0    Relevant Medications    apixaban (Eliquis) 5 mg tablet          CKD stage IIIb-with creatinine ranging from 1.1-1.3  History of SIADH/hyponatremia-sodium is stable  Hypothyroidism  Diabetes mellitus type 2-last hemoglobin A1c 6.6  Hypertension  Paroxysmal atrial fibrillation  Obesity  Chronic diarrhea  Metabolic alkalosis           PAULIE Stahl-ANISA, DNP 04/25/24 9:44 AM

## 2024-04-25 NOTE — ASSESSMENT & PLAN NOTE
Blood pressure is currently well-controlled and is also well-controlled at home, on lisinopril 40 mg daily and metoprolol 75 mg twice a day, amlodipine was on her list however it was not with the medications that she brought with her, does not need to take this at this time

## 2024-04-25 NOTE — ASSESSMENT & PLAN NOTE
Increase in lower extremity edema, will increase Bumex to 1 mg twice a day for the next week, she will get her lab works repeated on Wednesday, she will check her weights every day at home, she will check her blood pressures at home, I will call her next Thursday with further recommendations, will also increase potassium chloride to 20 mill colons in the morning and 10 mill colons in the afternoon, will plan on follow-up in the office in 1 month

## 2024-05-01 ENCOUNTER — LAB (OUTPATIENT)
Dept: LAB | Facility: LAB | Age: 79
End: 2024-05-01
Payer: COMMERCIAL

## 2024-05-01 DIAGNOSIS — N18.32 TYPE 2 DIABETES MELLITUS WITH STAGE 3B CHRONIC KIDNEY DISEASE, WITH LONG-TERM CURRENT USE OF INSULIN (MULTI): ICD-10-CM

## 2024-05-01 DIAGNOSIS — I50.32 CHRONIC DIASTOLIC HEART FAILURE (MULTI): ICD-10-CM

## 2024-05-01 DIAGNOSIS — N18.32 STAGE 3B CHRONIC KIDNEY DISEASE (MULTI): ICD-10-CM

## 2024-05-01 DIAGNOSIS — E11.22 TYPE 2 DIABETES MELLITUS WITH STAGE 3B CHRONIC KIDNEY DISEASE, WITH LONG-TERM CURRENT USE OF INSULIN (MULTI): ICD-10-CM

## 2024-05-01 DIAGNOSIS — Z79.4 TYPE 2 DIABETES MELLITUS WITH STAGE 3B CHRONIC KIDNEY DISEASE, WITH LONG-TERM CURRENT USE OF INSULIN (MULTI): ICD-10-CM

## 2024-05-01 LAB
ALBUMIN SERPL BCP-MCNC: 4.1 G/DL (ref 3.4–5)
ALP SERPL-CCNC: 138 U/L (ref 33–136)
ALT SERPL W P-5'-P-CCNC: 14 U/L (ref 7–45)
ANION GAP SERPL CALC-SCNC: 15 MMOL/L (ref 10–20)
APPEARANCE UR: CLEAR
AST SERPL W P-5'-P-CCNC: 25 U/L (ref 9–39)
BACTERIA #/AREA URNS AUTO: ABNORMAL /HPF
BILIRUB SERPL-MCNC: 1.5 MG/DL (ref 0–1.2)
BILIRUB UR STRIP.AUTO-MCNC: NEGATIVE MG/DL
BNP SERPL-MCNC: 398 PG/ML (ref 0–99)
BUN SERPL-MCNC: 23 MG/DL (ref 6–23)
CALCIUM SERPL-MCNC: 9.1 MG/DL (ref 8.6–10.3)
CHLORIDE SERPL-SCNC: 98 MMOL/L (ref 98–107)
CHOLEST SERPL-MCNC: 135 MG/DL (ref 0–199)
CHOLESTEROL/HDL RATIO: 4.5
CO2 SERPL-SCNC: 34 MMOL/L (ref 21–32)
COLOR UR: YELLOW
CREAT SERPL-MCNC: 1.25 MG/DL (ref 0.5–1.05)
EGFRCR SERPLBLD CKD-EPI 2021: 44 ML/MIN/1.73M*2
ERYTHROCYTE [DISTWIDTH] IN BLOOD BY AUTOMATED COUNT: 13.5 % (ref 11.5–14.5)
EST. AVERAGE GLUCOSE BLD GHB EST-MCNC: 186 MG/DL
GLUCOSE SERPL-MCNC: 182 MG/DL (ref 74–99)
GLUCOSE UR STRIP.AUTO-MCNC: NEGATIVE MG/DL
HBA1C MFR BLD: 8.1 %
HCT VFR BLD AUTO: 41.7 % (ref 36–46)
HDLC SERPL-MCNC: 30 MG/DL
HGB BLD-MCNC: 13.6 G/DL (ref 12–16)
KETONES UR STRIP.AUTO-MCNC: NEGATIVE MG/DL
LDLC SERPL CALC-MCNC: 39 MG/DL
LEUKOCYTE ESTERASE UR QL STRIP.AUTO: ABNORMAL
MCH RBC QN AUTO: 32.7 PG (ref 26–34)
MCHC RBC AUTO-ENTMCNC: 32.6 G/DL (ref 32–36)
MCV RBC AUTO: 100 FL (ref 80–100)
MUCOUS THREADS #/AREA URNS AUTO: ABNORMAL /LPF
NITRITE UR QL STRIP.AUTO: NEGATIVE
NON HDL CHOLESTEROL: 105 MG/DL (ref 0–149)
NRBC BLD-RTO: 0 /100 WBCS (ref 0–0)
PH UR STRIP.AUTO: 8 [PH]
PLATELET # BLD AUTO: 162 X10*3/UL (ref 150–450)
POTASSIUM SERPL-SCNC: 3.6 MMOL/L (ref 3.5–5.3)
PROT SERPL-MCNC: 7.2 G/DL (ref 6.4–8.2)
PROT UR STRIP.AUTO-MCNC: ABNORMAL MG/DL
RBC # BLD AUTO: 4.16 X10*6/UL (ref 4–5.2)
RBC # UR STRIP.AUTO: NEGATIVE /UL
RBC #/AREA URNS AUTO: ABNORMAL /HPF
SODIUM SERPL-SCNC: 143 MMOL/L (ref 136–145)
SP GR UR STRIP.AUTO: 1.01
SQUAMOUS #/AREA URNS AUTO: ABNORMAL /HPF
TRIGL SERPL-MCNC: 329 MG/DL (ref 0–149)
URATE SERPL-MCNC: 9.8 MG/DL (ref 2.3–6.7)
UROBILINOGEN UR STRIP.AUTO-MCNC: <2 MG/DL
VLDL: 66 MG/DL (ref 0–40)
WBC # BLD AUTO: 7.3 X10*3/UL (ref 4.4–11.3)
WBC #/AREA URNS AUTO: ABNORMAL /HPF

## 2024-05-01 PROCEDURE — 84550 ASSAY OF BLOOD/URIC ACID: CPT

## 2024-05-01 PROCEDURE — 82570 ASSAY OF URINE CREATININE: CPT

## 2024-05-01 PROCEDURE — 85027 COMPLETE CBC AUTOMATED: CPT

## 2024-05-01 PROCEDURE — 80053 COMPREHEN METABOLIC PANEL: CPT

## 2024-05-01 PROCEDURE — 36415 COLL VENOUS BLD VENIPUNCTURE: CPT

## 2024-05-01 PROCEDURE — 83036 HEMOGLOBIN GLYCOSYLATED A1C: CPT

## 2024-05-01 PROCEDURE — 83880 ASSAY OF NATRIURETIC PEPTIDE: CPT

## 2024-05-01 PROCEDURE — 81001 URINALYSIS AUTO W/SCOPE: CPT

## 2024-05-01 PROCEDURE — 82043 UR ALBUMIN QUANTITATIVE: CPT

## 2024-05-01 PROCEDURE — 80061 LIPID PANEL: CPT

## 2024-05-02 LAB
CREAT UR-MCNC: 58.5 MG/DL (ref 20–320)
MICROALBUMIN UR-MCNC: 132.9 MG/L
MICROALBUMIN/CREAT UR: 227.2 UG/MG CREAT

## 2024-05-03 DIAGNOSIS — N18.32 TYPE 2 DIABETES MELLITUS WITH STAGE 3B CHRONIC KIDNEY DISEASE, WITH LONG-TERM CURRENT USE OF INSULIN (MULTI): Primary | ICD-10-CM

## 2024-05-03 DIAGNOSIS — Z79.4 TYPE 2 DIABETES MELLITUS WITH STAGE 3B CHRONIC KIDNEY DISEASE, WITH LONG-TERM CURRENT USE OF INSULIN (MULTI): Primary | ICD-10-CM

## 2024-05-03 DIAGNOSIS — R60.0 PEDAL EDEMA: ICD-10-CM

## 2024-05-03 DIAGNOSIS — E11.22 TYPE 2 DIABETES MELLITUS WITH STAGE 3B CHRONIC KIDNEY DISEASE, WITH LONG-TERM CURRENT USE OF INSULIN (MULTI): Primary | ICD-10-CM

## 2024-05-03 RX ORDER — BUMETANIDE 1 MG/1
1 TABLET ORAL DAILY
Qty: 180 TABLET | Refills: 3 | Status: SHIPPED | OUTPATIENT
Start: 2024-05-03 | End: 2026-04-23

## 2024-05-03 RX ORDER — DAPAGLIFLOZIN 5 MG/1
5 TABLET, FILM COATED ORAL EVERY 24 HOURS
Qty: 30 TABLET | Refills: 11 | Status: SHIPPED | OUTPATIENT
Start: 2024-05-03 | End: 2025-05-03

## 2024-05-17 ENCOUNTER — OFFICE VISIT (OUTPATIENT)
Dept: PRIMARY CARE | Facility: CLINIC | Age: 79
End: 2024-05-17
Payer: COMMERCIAL

## 2024-05-17 VITALS
HEART RATE: 66 BPM | HEIGHT: 61 IN | SYSTOLIC BLOOD PRESSURE: 130 MMHG | OXYGEN SATURATION: 97 % | WEIGHT: 203.2 LBS | DIASTOLIC BLOOD PRESSURE: 80 MMHG | BODY MASS INDEX: 38.36 KG/M2

## 2024-05-17 DIAGNOSIS — E11.9 TYPE 2 DIABETES MELLITUS WITHOUT COMPLICATION, WITHOUT LONG-TERM CURRENT USE OF INSULIN (MULTI): ICD-10-CM

## 2024-05-17 DIAGNOSIS — E03.9 HYPOTHYROIDISM, UNSPECIFIED TYPE: Primary | ICD-10-CM

## 2024-05-17 DIAGNOSIS — H04.123 DRY EYES: ICD-10-CM

## 2024-05-17 PROCEDURE — 1157F ADVNC CARE PLAN IN RCRD: CPT | Performed by: STUDENT IN AN ORGANIZED HEALTH CARE EDUCATION/TRAINING PROGRAM

## 2024-05-17 PROCEDURE — 1036F TOBACCO NON-USER: CPT | Performed by: STUDENT IN AN ORGANIZED HEALTH CARE EDUCATION/TRAINING PROGRAM

## 2024-05-17 PROCEDURE — 1159F MED LIST DOCD IN RCRD: CPT | Performed by: STUDENT IN AN ORGANIZED HEALTH CARE EDUCATION/TRAINING PROGRAM

## 2024-05-17 PROCEDURE — 3079F DIAST BP 80-89 MM HG: CPT | Performed by: STUDENT IN AN ORGANIZED HEALTH CARE EDUCATION/TRAINING PROGRAM

## 2024-05-17 PROCEDURE — 1160F RVW MEDS BY RX/DR IN RCRD: CPT | Performed by: STUDENT IN AN ORGANIZED HEALTH CARE EDUCATION/TRAINING PROGRAM

## 2024-05-17 PROCEDURE — 3075F SYST BP GE 130 - 139MM HG: CPT | Performed by: STUDENT IN AN ORGANIZED HEALTH CARE EDUCATION/TRAINING PROGRAM

## 2024-05-17 PROCEDURE — 99214 OFFICE O/P EST MOD 30 MIN: CPT | Performed by: STUDENT IN AN ORGANIZED HEALTH CARE EDUCATION/TRAINING PROGRAM

## 2024-05-17 RX ORDER — FLASH GLUCOSE SENSOR
KIT MISCELLANEOUS
Qty: 2 EACH | Refills: 11 | Status: SHIPPED | OUTPATIENT
Start: 2024-05-17

## 2024-05-17 RX ORDER — FLASH GLUCOSE SCANNING READER
EACH MISCELLANEOUS
Qty: 1 EACH | Refills: 1 | Status: SHIPPED | OUTPATIENT
Start: 2024-05-17

## 2024-05-17 RX ORDER — FLASH GLUCOSE SENSOR
KIT MISCELLANEOUS
Qty: 1 EACH | Refills: 3 | Status: SHIPPED | OUTPATIENT
Start: 2024-05-17

## 2024-05-17 ASSESSMENT — PATIENT HEALTH QUESTIONNAIRE - PHQ9
2. FEELING DOWN, DEPRESSED OR HOPELESS: NOT AT ALL
1. LITTLE INTEREST OR PLEASURE IN DOING THINGS: NOT AT ALL
SUM OF ALL RESPONSES TO PHQ9 QUESTIONS 1 AND 2: 0

## 2024-05-17 NOTE — PROGRESS NOTES
Subjective   Patient ID: Maira Shearer is a 78 y.o. female who presents for Med Check (PT is here today for med check. Dexa needs ordered. AWV needs scheduled for 09/02/24. ).    HPI    Here for follow up.     HTN: Currently under control. Will continue current regimen.     DM2: most recent A1C is 8.1 up from around 6 in the past. Was started on Farxiga by nephro. Will reassess before the next appointment.     Pedal edema is better now.     CKD: follows with nephro. Started on Farxiga recently.     Hypothyroidism: needs reassessed. Ordered labs. Pt taking meds regularly.    Review of Systems  ROS negative except discussed above in HPI.    Vitals:    05/17/24 0940   BP: 130/80   Pulse: 66   SpO2: 97%     Objective   Physical Exam  Constitutional:       Appearance: Normal appearance.   Cardiovascular:      Rate and Rhythm: Normal rate and regular rhythm.   Pulmonary:      Effort: Pulmonary effort is normal.      Breath sounds: Normal breath sounds.   Musculoskeletal:      Cervical back: Normal range of motion and neck supple.   Lymphadenopathy:      Cervical: No cervical adenopathy.   Neurological:      Mental Status: She is alert.           Assessment/Plan   Maira was seen today for med check.  Diagnoses and all orders for this visit:  Hypothyroidism, unspecified type (Primary)  -     TSH with reflex to Free T4 if abnormal; Future  Type 2 diabetes mellitus without complication, without long-term current use of insulin (Multi)  -     flash glucose sensor kit (FreeStyle Alannah 2 Sensor) kit; Use as instructed  -     flash glucose scanning reader (FreeStyle Alannah 2 [place]) misc; Use as instructed  -     Hemoglobin A1C; Future  -     flash glucose sensor kit (FreeStyle Alannah 14 Day Sensor) kit; Use as instructed  Dry eyes  -     dextran 70-hypromellose (Bion Tears) 0.1-0.3 % ophthalmic solution; Administer 1 drop into both eyes 3 times a day as needed for dry eyes.      Follow up in 3 months.          Rik LUNA  MD [facility]

## 2024-05-20 DIAGNOSIS — E78.2 MIXED HYPERLIPIDEMIA: Primary | ICD-10-CM

## 2024-05-20 DIAGNOSIS — E87.6 HYPOKALEMIA: ICD-10-CM

## 2024-05-20 NOTE — TELEPHONE ENCOUNTER
Lionel called states pt needs refill on Lovastatin 20 mg & Potassium 20 mEq, 90 day supply sent to [facility].

## 2024-05-21 RX ORDER — POTASSIUM CHLORIDE 1500 MG/1
20 TABLET, EXTENDED RELEASE ORAL DAILY
Qty: 90 TABLET | Refills: 3 | Status: SHIPPED | OUTPATIENT
Start: 2024-05-21

## 2024-05-21 RX ORDER — LOVASTATIN 20 MG/1
20 TABLET ORAL NIGHTLY
Qty: 90 TABLET | Refills: 3 | Status: SHIPPED | OUTPATIENT
Start: 2024-05-21

## 2024-05-30 ENCOUNTER — APPOINTMENT (OUTPATIENT)
Dept: NEPHROLOGY | Facility: CLINIC | Age: 79
End: 2024-05-30
Payer: COMMERCIAL

## 2024-06-07 ENCOUNTER — OFFICE VISIT (OUTPATIENT)
Dept: NEPHROLOGY | Facility: CLINIC | Age: 79
End: 2024-06-07
Payer: COMMERCIAL

## 2024-06-07 VITALS
DIASTOLIC BLOOD PRESSURE: 66 MMHG | BODY MASS INDEX: 37.99 KG/M2 | WEIGHT: 201.2 LBS | HEIGHT: 61 IN | SYSTOLIC BLOOD PRESSURE: 118 MMHG | HEART RATE: 113 BPM

## 2024-06-07 DIAGNOSIS — I10 HYPERTENSION, UNSPECIFIED TYPE: ICD-10-CM

## 2024-06-07 DIAGNOSIS — I10 PRIMARY HYPERTENSION: ICD-10-CM

## 2024-06-07 DIAGNOSIS — N18.32 TYPE 2 DIABETES MELLITUS WITH STAGE 3B CHRONIC KIDNEY DISEASE, WITH LONG-TERM CURRENT USE OF INSULIN (MULTI): ICD-10-CM

## 2024-06-07 DIAGNOSIS — E11.22 TYPE 2 DIABETES MELLITUS WITH STAGE 3B CHRONIC KIDNEY DISEASE, WITH LONG-TERM CURRENT USE OF INSULIN (MULTI): ICD-10-CM

## 2024-06-07 DIAGNOSIS — Z79.4 TYPE 2 DIABETES MELLITUS WITH STAGE 3B CHRONIC KIDNEY DISEASE, WITH LONG-TERM CURRENT USE OF INSULIN (MULTI): ICD-10-CM

## 2024-06-07 DIAGNOSIS — N18.32 STAGE 3B CHRONIC KIDNEY DISEASE (MULTI): Primary | ICD-10-CM

## 2024-06-07 PROCEDURE — 1160F RVW MEDS BY RX/DR IN RCRD: CPT | Performed by: CLINICAL NURSE SPECIALIST

## 2024-06-07 PROCEDURE — 1157F ADVNC CARE PLAN IN RCRD: CPT | Performed by: CLINICAL NURSE SPECIALIST

## 2024-06-07 PROCEDURE — 99213 OFFICE O/P EST LOW 20 MIN: CPT | Performed by: CLINICAL NURSE SPECIALIST

## 2024-06-07 PROCEDURE — 1159F MED LIST DOCD IN RCRD: CPT | Performed by: CLINICAL NURSE SPECIALIST

## 2024-06-07 PROCEDURE — 3078F DIAST BP <80 MM HG: CPT | Performed by: CLINICAL NURSE SPECIALIST

## 2024-06-07 PROCEDURE — 3074F SYST BP LT 130 MM HG: CPT | Performed by: CLINICAL NURSE SPECIALIST

## 2024-06-07 PROCEDURE — 1036F TOBACCO NON-USER: CPT | Performed by: CLINICAL NURSE SPECIALIST

## 2024-06-07 RX ORDER — LISINOPRIL 40 MG/1
40 TABLET ORAL DAILY
Qty: 90 TABLET | Refills: 3 | Status: SHIPPED | OUTPATIENT
Start: 2024-06-07

## 2024-06-07 ASSESSMENT — ENCOUNTER SYMPTOMS
ENDOCRINE NEGATIVE: 1
GASTROINTESTINAL NEGATIVE: 1
CONSTITUTIONAL NEGATIVE: 1
NEUROLOGICAL NEGATIVE: 1
PSYCHIATRIC NEGATIVE: 1
CARDIOVASCULAR NEGATIVE: 1
RESPIRATORY NEGATIVE: 1
MUSCULOSKELETAL NEGATIVE: 1

## 2024-06-07 NOTE — ASSESSMENT & PLAN NOTE
Renal function is stable with creatinine of 1.25, blood pressure is well-controlled, getting better control of her blood sugars, on SGLT2/Farxiga   English

## 2024-06-07 NOTE — ASSESSMENT & PLAN NOTE
Blood sugars occasionally are high but she is getting better control, uses continuous monitor, last hemoglobin A1c 8.1, started on Farxiga at her last office visit along with her insulin

## 2024-06-07 NOTE — PROGRESS NOTES
Subjective   Patient ID: Maira Shearer is a 78 y.o. female who presents for Follow-up (1 month ck/Review labs 5/1).  Being seen in follow-up for chronic kidney disease    Labs reviewed which were completed on May 1    She is doing well  She is weighing herself every day  Her weight is staying about 199-200  She is measuring her blood pressure at home which is well-controlled  Her edema has improved significantly  She is wearing compression stockings  She denies any increase in shortness of breath                Review of Systems   Constitutional: Negative.    Respiratory: Negative.     Cardiovascular: Negative.    Gastrointestinal: Negative.    Endocrine: Negative.    Genitourinary: Negative.    Musculoskeletal: Negative.    Skin: Negative.    Neurological: Negative.    Psychiatric/Behavioral: Negative.         Objective   Physical Exam  Vitals reviewed.   Constitutional:       Appearance: Normal appearance.   HENT:      Head: Normocephalic.   Cardiovascular:      Rate and Rhythm: Normal rate and regular rhythm.   Pulmonary:      Effort: Pulmonary effort is normal.      Breath sounds: Normal breath sounds.   Abdominal:      Palpations: Abdomen is soft.   Musculoskeletal:         General: Normal range of motion.   Skin:     General: Skin is warm and dry.   Neurological:      Mental Status: She is alert and oriented to person, place, and time.   Psychiatric:         Mood and Affect: Mood normal.         Behavior: Behavior normal.         Assessment/Plan   Problem List Items Addressed This Visit             ICD-10-CM    Primary hypertension I10     Blood pressure is currently well-controlled on lisinopril and metoprolol         Stage 3b chronic kidney disease (Multi) - Primary N18.32     Renal function is stable with creatinine of 1.25, blood pressure is well-controlled, getting better control of her blood sugars, on SGLT2/Farxiga         Relevant Orders    Basic metabolic panel    Urinalysis with Reflex Microscopic     Albumin , Urine Random    Follow Up In Nephrology    Type 2 diabetes mellitus with kidney complication, with long-term current use of insulin (Multi) E11.29, Z79.4     Blood sugars occasionally are high but she is getting better control, uses continuous monitor, last hemoglobin A1c 8.1, started on Farxiga at her last office visit along with her insulin          Other Visit Diagnoses         Codes    Hypertension, unspecified type     I10    Relevant Medications    lisinopril 40 mg tablet          CKD stage IIIb-with creatinine ranging from 1.1-1.3  History of SIADH/hyponatremia-sodium is stable  Hypothyroidism  Diabetes mellitus type 2-last hemoglobin A1c 6.6  Hypertension  Paroxysmal atrial fibrillation  Obesity  Chronic diarrhea  Metabolic alkalosis        Morena Tyson, PAULIE-ANISA, DNP 06/07/24 10:52 AM

## 2024-06-10 ENCOUNTER — TELEPHONE (OUTPATIENT)
Dept: PRIMARY CARE | Facility: CLINIC | Age: 79
End: 2024-06-10

## 2024-06-10 ENCOUNTER — OFFICE VISIT (OUTPATIENT)
Dept: CARDIOLOGY | Facility: CLINIC | Age: 79
End: 2024-06-10
Payer: COMMERCIAL

## 2024-06-10 VITALS
HEIGHT: 61 IN | DIASTOLIC BLOOD PRESSURE: 68 MMHG | HEART RATE: 114 BPM | OXYGEN SATURATION: 96 % | BODY MASS INDEX: 38.33 KG/M2 | SYSTOLIC BLOOD PRESSURE: 122 MMHG | WEIGHT: 203 LBS

## 2024-06-10 DIAGNOSIS — Z95.0 PACEMAKER: ICD-10-CM

## 2024-06-10 DIAGNOSIS — I48.21 PERMANENT ATRIAL FIBRILLATION (MULTI): ICD-10-CM

## 2024-06-10 DIAGNOSIS — E78.1 HYPERTRIGLYCERIDEMIA: Primary | ICD-10-CM

## 2024-06-10 DIAGNOSIS — E11.9 TYPE 2 DIABETES MELLITUS WITHOUT COMPLICATION, WITHOUT LONG-TERM CURRENT USE OF INSULIN (MULTI): ICD-10-CM

## 2024-06-10 PROCEDURE — 3074F SYST BP LT 130 MM HG: CPT | Performed by: STUDENT IN AN ORGANIZED HEALTH CARE EDUCATION/TRAINING PROGRAM

## 2024-06-10 PROCEDURE — 1157F ADVNC CARE PLAN IN RCRD: CPT | Performed by: STUDENT IN AN ORGANIZED HEALTH CARE EDUCATION/TRAINING PROGRAM

## 2024-06-10 PROCEDURE — 1036F TOBACCO NON-USER: CPT | Performed by: STUDENT IN AN ORGANIZED HEALTH CARE EDUCATION/TRAINING PROGRAM

## 2024-06-10 PROCEDURE — 1159F MED LIST DOCD IN RCRD: CPT | Performed by: STUDENT IN AN ORGANIZED HEALTH CARE EDUCATION/TRAINING PROGRAM

## 2024-06-10 PROCEDURE — 3078F DIAST BP <80 MM HG: CPT | Performed by: STUDENT IN AN ORGANIZED HEALTH CARE EDUCATION/TRAINING PROGRAM

## 2024-06-10 PROCEDURE — 99214 OFFICE O/P EST MOD 30 MIN: CPT | Performed by: STUDENT IN AN ORGANIZED HEALTH CARE EDUCATION/TRAINING PROGRAM

## 2024-06-10 PROCEDURE — 1160F RVW MEDS BY RX/DR IN RCRD: CPT | Performed by: STUDENT IN AN ORGANIZED HEALTH CARE EDUCATION/TRAINING PROGRAM

## 2024-06-10 RX ORDER — INSULIN LISPRO 100 [IU]/ML
INJECTION, SUSPENSION SUBCUTANEOUS
Qty: 9 ML | Refills: 3 | Status: SHIPPED | OUTPATIENT
Start: 2024-06-10

## 2024-06-10 RX ORDER — METOPROLOL TARTRATE 100 MG/1
100 TABLET ORAL 2 TIMES DAILY
Qty: 60 TABLET | Refills: 11 | Status: SHIPPED | OUTPATIENT
Start: 2024-06-10 | End: 2025-06-10

## 2024-06-10 NOTE — PROGRESS NOTES
Chief Complaint   Patient presents with    3 month follow up      HPI:  I was requested by Dr. Farr to evaluate this patient in consultation for cardiac assessment.     Patient 79-year-old female with a medical history of S/P PPM (11/2023), hypertension, hyperlipidemia, diabetes, atrial fibrillation on Eliquis who was admitted and seen by me at [facility] on 5/17/2022 for runs of atrial fibrillation with ambulation. Patient still looks overloaded.     Problem #1 paroxysmal atrial fibrillation  - On Eliquis 5 mg twice daily.  -She denies any symptoms. She denies any palpitations/shortness of breath with exertion/chest pain/orthopnea PND/lower extremity edema. Also says no to dizziness/lightheadedness/syncopal episodes.  -No bleeding complications on the Eliquis.     Problem #2 hypertension  -Currently on lisinopril 40 mg daily and amlodipine 5 mg daily  -Normotensive in clinic today     Problem #3 diabetes concurrent with hyperlipidemia  -On lovastatin 20 mg LDL was 51 mg/dL in 2022.      Problem #4 S/P PPM (11/29/2023)  - Recent PPM placement  - EKG on Afib     Patient returns today felling well. However, she states that she increased her weight in 3lb this week and is feeling a liitle bit more shorness of breath than usual. She is also non-compliant with salt restriction - for example, diet rich in sausages.       Past Medical History  Past Medical History:   Diagnosis Date    Encounter for full-term uncomplicated delivery ([facility])     Normal vaginal delivery    Encounter for gynecological examination (general) (routine) without abnormal findings 09/01/2020    Encounter for routine gynecological examination    Other conditions influencing health status     Menstruation    Other fecal abnormalities     Stool guaiac positive    Personal history of other diseases of the circulatory system     History of CHF (congestive heart failure)    Personal history of other medical treatment 10/06/2021     H/O mammogram       Past Surgical History  Past Surgical History:   Procedure Laterality Date    CT ABDOMEN PELVIS ANGIOGRAM W AND/OR WO IV CONTRAST  07/21/2023    CT ABDOMEN PELVIS ANGIOGRAM W AND/OR WO IV CONTRAST 7/21/2023 [facility] CT    INSERT / REPLACE / REMOVE PACEMAKER      OTHER SURGICAL HISTORY  10/17/2019    Throat surgery    OTHER SURGICAL HISTORY  10/17/2019    Cholecystectomy    OTHER SURGICAL HISTORY  12/12/2019    Shoulder surgery    OTHER SURGICAL HISTORY  02/19/2020    Knee surgery    OTHER SURGICAL HISTORY  09/22/2020    Knee replacement    OTHER SURGICAL HISTORY  12/12/2019    Carpal tunnel surgery    OTHER SURGICAL HISTORY  12/12/2019    Thyroidectomy    OTHER SURGICAL HISTORY  12/12/2019    Cataract surgery       Past Family History  Family History   Problem Relation Name Age of Onset    Diabetes Mother      Hypertension Mother      Diabetes Father      Hypertension Father         Allergy History  Allergies   Allergen Reactions    Fenofibrate Hives    Oxycodone-Acetaminophen Unknown     vomitting    Aspirin Rash    Metformin Rash       Past Social History  Social History     Socioeconomic History    Marital status:      Spouse name: None    Number of children: None    Years of education: None    Highest education level: None   Occupational History    None   Tobacco Use    Smoking status: Never    Smokeless tobacco: Never   Vaping Use    Vaping status: Never Used   Substance and Sexual Activity    Alcohol use: Never    Drug use: Never    Sexual activity: None   Other Topics Concern    None   Social History Narrative    None     Social Determinants of Health     Financial Resource Strain: Not on file   Food Insecurity: Not on file   Transportation Needs: No Transportation Needs (8/31/2023)    Received from [facility]    OASIS : Transportation     Lack of Transportation (Medical): No     Lack of Transportation (Non-Medical): No     Patient Unable or Declines to Respond: No   Physical  "Activity: Not on file   Stress: Not on file   Social Connections: Not on file   Intimate Partner Violence: Not on file   Housing Stability: Not on file       Social History     Tobacco Use   Smoking Status Never   Smokeless Tobacco Never       Review of Systems:  Constitutional: Denies any fever or chills  Eyes: Denies any eye pain or blurry vision  ENT: Denies any ear pain or hearing loss  Cardiovascular: The heart rate is not slow, the heart rate is not fast  Respiratory: Denies any asthma/wheezing  Gastrointestinal: Denies any daniela colored stools or fatty food intolerance  Genitourinary: Denies any blood in the urine or pelvic pain  Musculoskeletal: Denies any swelling in the joints or difficulty walking  Skin: Denies any skin lumps or skin lesions  Neurological: Denies any dizziness/tingling     Objective Data:  Last Recorded Vitals:  Vitals:    06/10/24 1319   BP: 122/68   Pulse: (!) 114   SpO2: 96%   Weight: 92.1 kg (203 lb)   Height: 1.549 m (5' 1\")       Last Labs:  CBC - 5/1/2024: 11:53 AM  7.3 13.6 162    41.7      CMP - 5/1/2024: 11:53 AM  9.1 7.2 25 --- 1.5   _ 4.1 14 138      PTT - 7/21/2023: 11:20 AM  1.7   19.3 42     TROPHS   Date/Time Value Ref Range Status   07/21/2023 11:20 AM 26 0 - 13 ng/L Final     Comment:     .  Less than 99th percentile of normal range cutoff-  Female and children under 18 years old <14 ng/L; Male <21 ng/L: Negative  Repeat testing should be performed if clinically indicated.   .  Female and children under 18 years old 14-50 ng/L; Male 21-50 ng/L:  Consistent with possible cardiac damage and possible increased clinical   risk. Serial measurements may help to assess extent of myocardial damage.   .  >50 ng/L: Consistent with cardiac damage, increased clinical risk and  myocardial infarction. Serial measurements may help assess extent of   myocardial damage.   .   NOTE: Children less than 1 year old may have higher baseline troponin   levels and results should be interpreted " in conjunction with the overall   clinical context.   .  NOTE: Troponin I testing is performed using a different   testing methodology at [facility] than at other   system hospitals. Direct result comparisons should only   be made within the same method.       BNP   Date/Time Value Ref Range Status   05/01/2024 11:53  0 - 99 pg/mL Final   07/21/2023 11:20 AM 1,235 0 - 99 pg/mL Final     Comment:     .  <100 pg/mL - Heart failure unlikely  100-299 pg/mL - Intermediate probability of acute heart  .               failure exacerbation. Correlate with clinical  .               context and patient history.    >=300 pg/mL - Heart Failure likely. Correlate with clinical  .               context and patient history.  BNP testing is performed using different testing   methodology at [facility] than at other   [facility] hospitals. Direct result comparisons should   only be made within the same method.     05/19/2021 08:38  0 - 99 pg/mL Final     Comment:     .  <100 pg/mL - Heart failure unlikely  100-299 pg/mL - Intermediate probability of acute heart  .               failure exacerbation. Correlate with clinical  .               context and patient history.    >=300 pg/mL - Heart Failure likely. Correlate with clinical  .               context and patient history.  BNP testing is performed using different testing   methodology at [facility] than at other   [facility] hospitals. Direct result comparisons should   only be made within the same method.       HGBA1C   Date/Time Value Ref Range Status   05/01/2024 11:53 AM 8.1 see below % Final   06/23/2023 07:25 AM 6.2 % Final     Comment:          Diagnosis of Diabetes-Adults   Non-Diabetic: < or = 5.6%   Increased risk for developing diabetes: 5.7-6.4%   Diagnostic of diabetes: > or = 6.5%  .       Monitoring of Diabetes                Age (y)     Therapeutic Goal (%)   Adults:          >18           <7.0   Pediatrics:    13-18      "      <7.5                   7-12           <8.0                   0- 6            7.5-8.5   American Diabetes Association. Diabetes Care 33(S1), Jan 2010.   01/11/2023 08:30 AM 6.6 % Final     Comment:          Diagnosis of Diabetes-Adults   Non-Diabetic: < or = 5.6%   Increased risk for developing diabetes: 5.7-6.4%   Diagnostic of diabetes: > or = 6.5%  .       Monitoring of Diabetes                Age (y)     Therapeutic Goal (%)   Adults:          >18           <7.0   Pediatrics:    13-18           <7.5                   7-12           <8.0                   0- 6            7.5-8.5   American Diabetes Association. Diabetes Care 33(S1), Jan 2010.     12/12/2021 02:37 AM 10.8 4.0 - 5.6 % Final   06/20/2021 06:05 AM 7.1 4.0 - 5.6 % Final     LDLCALC   Date/Time Value Ref Range Status   05/01/2024 11:53 AM 39 <=99 mg/dL Final     Comment:                                 Near   Borderline      AGE      Desirable  Optimal    High     High     Very High     0-19 Y     0 - 109     ---    110-129   >/= 130     ----    20-24 Y     0 - 119     ---    120-159   >/= 160     ----      >24 Y     0 -  99   100-129  130-159   160-189     >/=190       VLDL   Date/Time Value Ref Range Status   05/01/2024 11:53 AM 66 0 - 40 mg/dL Final   06/23/2023 07:25 AM 22 0 - 40 mg/dL Final   07/20/2022 07:25 AM 25 0 - 40 mg/dL Final   06/01/2021 12:05 PM 57 0 - 40 mg/dL Final        Patient Medications:  Outpatient Encounter Medications as of 6/10/2024   Medication Sig Dispense Refill    acetaminophen 325 mg chewable tablet Chew.      apixaban (Eliquis) 5 mg tablet Take 1 tablet (5 mg) by mouth 2 times a day. 180 tablet 1    aspirin 81 mg EC tablet Take 1 tablet (81 mg) by mouth once daily.      BD Ultra-Fine Micro Pen Needle 32 gauge x 1/4\" needle Up to three times daily 100 each 3    bumetanide (Bumex) 1 mg tablet Take 1 tablet (1 mg) by mouth once daily. Take a second tablet if your weight goes up by 2 pounds. 180 tablet 3    dextran " 70-hypromellose (Bion Tears) 0.1-0.3 % ophthalmic solution Administer 1 drop into both eyes 3 times a day as needed for dry eyes. 15 mL 11    Farxiga 5 mg Take 1 tablet (5 mg) by mouth once every 24 hours. 30 tablet 11    flash glucose scanning reader (FreeStyle Alannah 2 [place]) misc Use as instructed 1 each 1    flash glucose sensor kit (FreeStyle Alannah 14 Day Sensor) kit Use as instructed 2 each 11    flash glucose sensor kit (FreeStyle Alannah 2 Sensor) kit Use as instructed 1 each 3    insulin lispro protamin-lispro (HumaLOG Mix 75-25 KwikPen) 100 unit/mL (75-25) injection INJECT 15 UNITS SUBCUTANEOUSLY IN THE MORNING and 15 UNITS NIGHTLY 9 mL 3    levothyroxine (Synthroid, Levoxyl) 137 mcg tablet Take 1 tablet (137 mcg) by mouth once daily. 30 tablet 11    lisinopril 40 mg tablet Take 1 tablet (40 mg) by mouth once daily. 90 tablet 3    loratadine (Claritin) 10 mg tablet Take by mouth.      lovastatin (Mevacor) 20 mg tablet Take 1 tablet (20 mg) by mouth once daily at bedtime. 90 tablet 3    metoprolol tartrate (Lopressor) 75 mg tablet Take 1 tablet (75 mg) by mouth 2 times a day. 30 tablet 11    multivitamin tablet Take by mouth.      potassium chloride CR 20 mEq ER tablet Take 1 tablet (20 mEq) by mouth once daily. 90 tablet 3    [DISCONTINUED] lisinopril 40 mg tablet Take 1 tablet by mouth once daily 90 tablet 0     No facility-administered encounter medications on file as of 6/10/2024.       Physical Exam:  General: alert, oriented and in no acute distress  HEENT: NC/AT; EOMI; PERRLA, external ear is normal  Neck: supple; trachea midline; no masses; no JVD  Chest: lungs clear to auscultation bilaterally; no wheezing  CVS: regular rhythm, S1S2 normal, no murmurs  Abdomen: Soft, non-tender, non-distension, no organomegaly  Extremities: Edema 2+/3+ LE b/l  Neuro: Grossly intact     Psychiatric: Normal mood and affect    Past Cardiology Results (Last 3 Years):  EKG:  ECG 12 lead (Clinic Performed)  12/04/2023    Echo:    2020:   1. The left ventricular systolic function is normal with a 50-55% estimated ejection fraction.   2. The left atrium is severely dilated.   3. Patient is bradycardic at the time of the echocardiogram.    Echo Results:  Transthoracic Echo (TTE) Complete 02/23/2024    [place] [id number]  Phone [phone], Fax [phone]    TRANSTHORACIC ECHOCARDIOGRAM REPORT      Patient Name:      OGDARREL GONZALEZ     Reading Physician:    [id number] Adolph Paul MD  Study Date:        2/23/2024            Ordering Provider:    [id number] JENNIFER FUNMILAYODIANA SELLERS  MRN/PID:           [id number]             Fellow:  Accession#:        [id number]         Nurse:                Stacia Jensen RN  Date of Birth/Age: 1945 / 78 years  Sonographer:          Quique Amado RDCS  Gender:            F                    Additional Staff:  Height:            154.94 cm            Admit Date:  Weight:            92.53 kg             Admission Status:     Outpatient  BSA / BMI:         1.91 m2 / 38.55      Department Location:  [facility]  kg/m2  Blood Pressure: 82 /52 mmHg    Study Type:    TRANSTHORACIC ECHO (TTE) COMPLETE  Diagnosis/ICD: Unspecified atrial fibrillation-I48.91  CPT Codes:     Echo Complete w Full Doppler-16370  Study Detail: The following Echo studies were performed: 2D, M-Mode, Doppler and  color flow. Definity used as a contrast agent for endocardial  border definition. Total contrast used for this procedure was  2.00cc mL via IV push.      PHYSICIAN INTERPRETATION:  Left Ventricle: Left ventricular systolic function is normal, with an estimated ejection fraction of 55%. There are no regional wall motion abnormalities. The left ventricular cavity size is normal. Left ventricular diastolic filling was indeterminate.  Left Atrium: The left atrium is mildly dilated.  Right Ventricle: The right ventricle is normal in size. There is  reduced right ventricular systolic function.  Right Atrium: The right atrium is normal in size.  Aortic Valve: The aortic valve was not well visualized. There is no evidence of aortic valve regurgitation. The peak instantaneous gradient of the aortic valve is 5.0 mmHg. The mean gradient of the aortic valve is 3.0 mmHg.  Mitral Valve: The mitral valve is normal in structure. There is mild mitral valve regurgitation.  Tricuspid Valve: The tricuspid valve is structurally normal. Tricuspid regurgitation was not assessed.  Pulmonic Valve: The pulmonic valve is not well visualized. There is moderate pulmonic valve regurgitation.  Pericardium: There is no pericardial effusion noted.  Aorta: The aortic root is normal.  Systemic Veins: The inferior vena cava appears to be of normal size. There is IVC inspiratory collapse greater than 50%.      CONCLUSIONS:  1. Left ventricular systolic function is normal with a 55% estimated ejection fraction.  2. There is reduced right ventricular systolic function.  3. Moderate pulmonic valve regurgitation.  4. Calculated pulmonary artery systolic pressure is 46 mmHg suggestive of pulmonary hypertension.  5. Poorly visualized anatomical structures due to suboptimal image quality.    QUANTITATIVE DATA SUMMARY:  2D MEASUREMENTS:  Normal Ranges:  Ao Root d:     2.60 cm    (2.0-3.7cm)  LAs:           4.60 cm    (2.7-4.0cm)  IVSd:          1.31 cm    (0.6-1.1cm)  LVPWd:         1.00 cm    (0.6-1.1cm)  LVIDd:         5.01 cm    (3.9-5.9cm)  LVIDs:         3.96 cm  LV Mass Index: 116.6 g/m2  LV % FS        21.0 %    LA VOLUME:  Normal Ranges:  LA Vol A4C:        24.6 ml    (22+/-6mL/m2)  LA Vol A2C:        35.9 ml  LA Vol BP:         30.3 ml  LA Vol Index A4C:  12.9ml/m2  LA Vol Index A2C:  18.9 ml/m2  LA Vol Index BP:   15.9 ml/m2  LA Area A4C:       11.9 cm2  LA Area A2C:       14.7 cm2  LA Major Axis A4C: 4.9 cm  LA Major Axis A2C: 5.1 cm  LA Volume Index:   12.6 ml/m2  LA Vol A4C:        24.0  ml  LA Vol A2C:        36.0 ml    LV SYSTOLIC FUNCTION BY 2D PLANIMETRY (MOD):  Normal Ranges:  EF-A4C View: 53.8 % (>=55%)  EF-A2C View: 54.1 %  EF-Biplane:  52.3 %    LV DIASTOLIC FUNCTION:  Normal Ranges:  MV Peak E:    1.13 m/s (0.7-1.2 m/s)  MV Peak A:    0.52 m/s (0.42-0.7 m/s)  E/A Ratio:    2.17     (1.0-2.2)  MV lateral e' 0.09 m/s  MV medial e'  0.07 m/s    MITRAL VALVE:  Normal Ranges:  MV DT: 211 msec (150-240msec)    AORTIC VALVE:  Normal Ranges:  AoV Vmax:                1.12 m/s (<=1.7m/s)  AoV Peak P.0 mmHg (<20mmHg)  AoV Mean PG:             3.0 mmHg (1.7-11.5mmHg)  LVOT Max Dwayne:            0.89 m/s (<=1.1m/s)  AoV VTI:                 29.50 cm (18-25cm)  LVOT VTI:                15.80 cm  LVOT Diameter:           1.50 cm  (1.8-2.4cm)  AoV Area, VTI:           0.95 cm2 (2.5-5.5cm2)  AoV Area,Vmax:           1.40 cm2 (2.5-4.5cm2)  AoV Dimensionless Index: 0.54      RIGHT VENTRICLE:  TAPSE: 15.2 mm  RV s'  0.07 m/s    TRICUSPID VALVE/RVSP:  Normal Ranges:  Peak TR Velocity: 3.68 m/s  RV Syst Pressure: 57.2 mmHg (< 30mmHg)    PULMONIC VALVE:  Normal Ranges:  PV Accel Time: 81 msec  (>120ms)  PIEDV:         1.19 m/s  PADP:          8.7 mmHg      [id number] Adolph Paul MD  Electronically signed on 2024 at 12:55:07 PM        ** Final **     Cath:  No results found for this or any previous visit from the past 1095 days.    CV NCDR CATHPCI V5 COLLECTION FORM   Stress Test:  No results found for this or any previous visit from the past 1095 days.    Cardiac Imaging:  No results found for this or any previous visit from the past 1095 days.       Assessment/Plan   In summary, Mrs. Shearer is an 79-year-old female with a medical history of S/P PPM (), hypertension, hyperlipidemia, diabetes, atrial fibrillation on Eliquis who was admitted and seen by me at [facility] on 2022 for runs of atrial fibrillation with ambulation.     Assessment     # Heart Failure with  preserved LVEF (LVEF ~55%) / S/P PPM (11/29/2023)  - Patient returns today complaining of worsening SOB, with edema in both legs after PPM placement   - Last echo from 2020 with normal LVEF  - New echocardiogram (02/23/2024):   1. Left ventricular systolic function is normal with a 55% estimated ejection fraction.   2. There is reduced right ventricular systolic function.   3. Moderate pulmonic valve regurgitation.   4. Calculated pulmonary artery systolic pressure is 46 mmHg suggestive of pulmonary hypertension.   5. Poorly visualized anatomical structures due to suboptimal image quality.  - Patient have not started higher dose of Bumex as suggested before.  - Will increase Bumex to 2mg daily   - Keep Lisinopril 40mg daily  - Patient returns today felling well. However, she states that she increased her weight in 3lb this week and is feeling a liitle bit more shorness of breath than usual. She is also non-compliant with salt restriction - for example, diet rich in sausages.  - Patient tachycardic today - will increase Metoprolol tartrate to 100mg BID.  - Will refer the patient to the heart failure clinic - patient overloaded today, suggested to take an extra pill of Bumex for the next 3 days.  - Follow up in 6 months.     # Paroxysmal atrial fibrillation  - Elevated [id number] score of 3 which implies higher risk for thromboembolic events yearly, therefore should continue on stroke prophylaxis with DOAC - Eliquis 5mg BID.  - Patient is currently asymptomatic on rate control strategy with Metoprolol and Eliquis.   - Prior EKG showing atrial fibrillation.  - Prior echocardiogram shows normal left ventricle ejection fraction  - We had a thorough conversation about the triggers for atrial fibrillation, including alcohol, caffeine and chocolate.  - Counseled to exercise for better conditioning, for losing weight and to lower the baseline heart rate.  - Patient is tachycardic today. Will increase Metoprolol tartrate to  100mg BID.     # Hypertension  - Hypertensive on current regimen  - Keep Amlodipine 5mg daily, Metoprolol tartrate 100mg BID  - Keep Lisinopril 40mg daily.     # Diabetes & Hyperlipidemia  - LDL 39, HDL 30, Tri 329.  - Controlled by PCP.  - Keep home medication with Lovastatin 20mg daily.  - Patient allergic to Fenofibrate.   - Counseled on healthy diet and regular exercise.      # Hypothyroidism  - Keep Levothyroxine 137mcg daily     # S/P PPM (11/29/2023)  - Recent PPM placement  - EKG on Afib      We have discussed the most common side effects of the prescribed medications, indications, drug interactions, risks, complications, and alternatives of medications/therapeutics were explained and discussed. The patient has been requested to monitor closely for any untoward side effects or complications of medications. The patient has been strongly advised to be compliant with the recommendations, all the questions and concerns have been addressed. The patient has been also instructed to call, to return sooner or to go to the emergency department if symptoms persist or get worsen. The patient voiced understanding and denies any further questions at this time.     This note was transcribed using the Dragon Dictation system. There may be grammatical, punctuation, or verbiage errors that occur with voice recognition programs.     Counseling greater than 50% of visit regarding all cardiac issues.     Thank you for allowing me to participate in the care of this patient. Please do not hesitate to contact me with any further questions or concerns.     Benjamin Baker MD  Cardiology    Asthma

## 2024-06-10 NOTE — TELEPHONE ENCOUNTER
Please send refill to   insulin lispro protamin-lispro (HumaLOG Mix 75-25 KwikPen) 100 unit/mL (75-25) injection ??

## 2024-06-21 ENCOUNTER — CLINICAL SUPPORT (OUTPATIENT)
Dept: CARDIAC REHAB | Facility: HOSPITAL | Age: 79
End: 2024-06-21
Payer: COMMERCIAL

## 2024-06-21 VITALS
BODY MASS INDEX: 37.7 KG/M2 | SYSTOLIC BLOOD PRESSURE: 166 MMHG | RESPIRATION RATE: 18 BRPM | WEIGHT: 199.5 LBS | HEART RATE: 67 BPM | DIASTOLIC BLOOD PRESSURE: 89 MMHG | OXYGEN SATURATION: 94 %

## 2024-06-21 DIAGNOSIS — I50.30 DIASTOLIC HEART FAILURE, UNSPECIFIED HF CHRONICITY (MULTI): ICD-10-CM

## 2024-06-21 LAB
ANION GAP SERPL CALC-SCNC: 12 MMOL/L (ref 10–20)
BNP SERPL-MCNC: 472 PG/ML (ref 0–99)
BUN SERPL-MCNC: 22 MG/DL (ref 6–23)
CALCIUM SERPL-MCNC: 9 MG/DL (ref 8.6–10.3)
CHLORIDE SERPL-SCNC: 101 MMOL/L (ref 98–107)
CO2 SERPL-SCNC: 33 MMOL/L (ref 21–32)
CREAT SERPL-MCNC: 1.65 MG/DL (ref 0.5–1.05)
EGFRCR SERPLBLD CKD-EPI 2021: 31 ML/MIN/1.73M*2
GLUCOSE SERPL-MCNC: 141 MG/DL (ref 74–99)
POTASSIUM SERPL-SCNC: 4.3 MMOL/L (ref 3.5–5.3)
SODIUM SERPL-SCNC: 142 MMOL/L (ref 136–145)

## 2024-06-21 PROCEDURE — 99214 OFFICE O/P EST MOD 30 MIN: CPT

## 2024-06-21 PROCEDURE — 36415 COLL VENOUS BLD VENIPUNCTURE: CPT

## 2024-06-21 PROCEDURE — 83880 ASSAY OF NATRIURETIC PEPTIDE: CPT

## 2024-06-21 PROCEDURE — 80048 BASIC METABOLIC PNL TOTAL CA: CPT

## 2024-06-21 ASSESSMENT — PAIN SCALES - GENERAL: PAINLEVEL_OUTOF10: 0 - NO PAIN

## 2024-06-21 ASSESSMENT — PAIN - FUNCTIONAL ASSESSMENT: PAIN_FUNCTIONAL_ASSESSMENT: 0-10

## 2024-06-21 NOTE — PROGRESS NOTES
"Maira arrived ambulatory to the Heart Failure Clinic for her scheduled visit with her daughter present. States she is feeling \" really good \". She denies increased shortness of breath, PND, or Orthopnea. She is following a low sodium diet and eating and sleeping without distress. A Heart Failure Booklet and calendar were given and reviewed and instructed her to continue checking her daily weights and blood pressures at home. She brought her recordings of her blood pressure readings she does 3 times a day as well as her daily weights to her appointment today. We reviewed her home medications with her bottles brought from home and it was noted she was taking both the 75mg of Metoprolol Tartrate and the 100mg that was increased at her recent appointment with Dr. Baker, so I removed the 75mg bottle from her bag of medications and marked it as not taking and instructed her and her daughter to not take that one anymore and they both verbalized understanding. Maira is taking her Bumex 1mg daily and will take a second tablet if her weight is 203 or above.  A BMP and BNP were drawn today and results were called to Luisa Yepez DNP and Morena Tyson CNP and they both instructed to continue the same regimen and follow up with the [facility] in a month. We reviewed signs and symptoms of increased heart failure and when to call for help.    Vitals:  BP: 166/89            HR: 67           Resp:18           SPO2: 94% on RA       Weight: 199.5lbs                                   Lung Sounds: Clear     Edema: BL Lower Ext/Ankles Trace of Non Pitting    JVD: none    Labs: BMP/BNP    Medications Administered: None    Next Appointment Date: July 19th, 2024 @ 7:15am    Note in EMR for treating Physicians: Luisa Yepez DNP, Morena Tyson CNP    In-House Supervising Physician: Dr. Magana     "

## 2024-06-21 NOTE — PATIENT INSTRUCTIONS
Continue medications at same dose and follow up with the [facility] July 19th 2024 @ 7:15am     Patient verbalizes understanding for:  Low sodium diet-1500-2000mg daily of sodium  Fluid Restriction  Follow-up appointment with [facility]  Weighing self daily   Medications dose and schedule  Signs of increased heart failure  Activity Recommendations     Diet  2000 mg (2 gram) sodium diet-Do not add salt to foods or cook with salt. Check labels for Sodium (Na)     Resources  [facility] [phone] Monday - Friday 7:00 am - 3:00 pm  Websites: [email or web address]; American Heart Association: www.heart.org    Special Instructions:  If you smoke-Quit!  If you are not able to contact your doctor and need immediate medical attention, call 911  If you are not able to keep you're scheduled appointment at the [facility], call [phone]  Watch for and report to your doctor all warning signs of Heart Failure (increased difficulty with breathing, 3-5 pound weight gain in one week or less, increased swelling, increased tiredness)  Weigh yourself each day at the same time with the same amount of clothes on. Record your weight log. Bring it with your to each visit

## 2024-07-19 ENCOUNTER — APPOINTMENT (OUTPATIENT)
Dept: CARDIAC REHAB | Facility: HOSPITAL | Age: 79
End: 2024-07-19
Payer: COMMERCIAL

## 2024-07-19 VITALS
BODY MASS INDEX: 37.37 KG/M2 | HEART RATE: 73 BPM | SYSTOLIC BLOOD PRESSURE: 153 MMHG | OXYGEN SATURATION: 94 % | DIASTOLIC BLOOD PRESSURE: 85 MMHG | RESPIRATION RATE: 20 BRPM | WEIGHT: 197.8 LBS

## 2024-07-19 DIAGNOSIS — I50.30 DIASTOLIC HEART FAILURE, UNSPECIFIED HF CHRONICITY (MULTI): Primary | ICD-10-CM

## 2024-07-19 DIAGNOSIS — I10 PRIMARY HYPERTENSION: Primary | ICD-10-CM

## 2024-07-19 LAB
ANION GAP SERPL CALC-SCNC: 13 MMOL/L (ref 10–20)
BUN SERPL-MCNC: 19 MG/DL (ref 6–23)
CALCIUM SERPL-MCNC: 9.5 MG/DL (ref 8.6–10.3)
CHLORIDE SERPL-SCNC: 100 MMOL/L (ref 98–107)
CO2 SERPL-SCNC: 32 MMOL/L (ref 21–32)
CREAT SERPL-MCNC: 1.36 MG/DL (ref 0.5–1.05)
EGFRCR SERPLBLD CKD-EPI 2021: 39 ML/MIN/1.73M*2
GLUCOSE SERPL-MCNC: 138 MG/DL (ref 74–99)
POTASSIUM SERPL-SCNC: 3.9 MMOL/L (ref 3.5–5.3)
SODIUM SERPL-SCNC: 141 MMOL/L (ref 136–145)

## 2024-07-19 PROCEDURE — 99213 OFFICE O/P EST LOW 20 MIN: CPT

## 2024-07-19 PROCEDURE — 80048 BASIC METABOLIC PNL TOTAL CA: CPT

## 2024-07-19 PROCEDURE — 36415 COLL VENOUS BLD VENIPUNCTURE: CPT

## 2024-07-19 RX ORDER — AMLODIPINE BESYLATE 10 MG/1
10 TABLET ORAL DAILY
Qty: 30 TABLET | Refills: 11 | Status: SHIPPED | OUTPATIENT
Start: 2024-07-19 | End: 2025-07-19

## 2024-07-19 NOTE — PROGRESS NOTES
"Maira arrived ambulatory to the [facility] for her scheduled visit. Maira states she is feeling \"ok, I fell about 2 weeks ago because I was dizzy so I stopped taking my Lisinopril\". She denies increased shortness of breath, PND, or Orthopnea. Eating and sleeping without distress. Home medications reviewed. A BMP was drawn today and results were called to Luisa Yepez DNP. Luisa instructed for Maira to increase Farxiga to 10mg daily and stop lisinopril and start Amlodpine 10mg daily. Maira will follow up in 2 weeks. Reviewed signs and symptoms of increased heart failure and when to call for help.    Vitals:  BP: 153/85           HR: 73           Resp: 20          SPO2: 94% on room air       Weight:  197.8lbs                        Previous Weight: 199.5lbs         Lung Sounds: clear    Edema: +1 bl legs    JVD: absent     Labs: BMP    Medications titration: stop lisinopril start Amlodipine 10mg daily and increase Farxiga to 10mg daily    Next Appointment Date: August 1, 2024@ 7:15am    Note in EMR for treating Physician: Dr. Baker/ Morena Tyson CNP/Luisa Yepez DNP    In-House Supervising Physician: Dr. Spicer    "

## 2024-07-19 NOTE — PATIENT INSTRUCTIONS
stop lisinopril   start Amlodipine 10mg daily   increase Farxiga to 10mg daily    Diet  2000 mg (2 gram) sodium diet-Do not add salt to foods or cook with salt. Check labels for Sodium (Na)     Resources  [facility] [phone] Monday - Friday 7:00 am - 3:00 pm  Websites: www.[email or web address]; American Heart Association: www.heart.org    Special Instructions:  If you smoke-Quit!  If you are not able to contact your doctor and need immediate medical attention, call 911  If you are not able to keep you're scheduled appointment at the [facility], call [phone]  Watch for and report to your doctor all warning signs of Heart Failure (increased difficulty with breathing, 3-5 pound weight gain in one week or less, increased swelling, increased tiredness)  Weigh yourself each day at the same time with the same amount of clothes on. Record your weight log. Bring it with your to each visit    Patient verbalizes understanding for:  Low sodium diet:1500-2000mg daily of sodium, Fluid Restriction, Follow-up appointment with [facility], Weighing self daily, Medications dose and schedule, Signs of increased heart failure and Activity Recommendations

## 2024-07-23 ENCOUNTER — APPOINTMENT (OUTPATIENT)
Dept: CARDIOLOGY | Facility: CLINIC | Age: 79
End: 2024-07-23
Payer: COMMERCIAL

## 2024-07-23 DIAGNOSIS — Z95.0 PACEMAKER: Primary | ICD-10-CM

## 2024-07-23 DIAGNOSIS — I48.91 ATRIAL FIBRILLATION, UNSPECIFIED TYPE (MULTI): ICD-10-CM

## 2024-07-24 ENCOUNTER — TELEPHONE (OUTPATIENT)
Dept: PRIMARY CARE | Facility: CLINIC | Age: 79
End: 2024-07-24
Payer: COMMERCIAL

## 2024-07-24 NOTE — TELEPHONE ENCOUNTER
Please send refill to  Pharmacy    [id number] - [place], [place] - [address]  [address] [facility] [address]  Phone: [phone]  Fax: [phone]   Joanne Jaffe 2

## 2024-07-25 NOTE — TELEPHONE ENCOUNTER
Looks like she has refills on Sensors.   There are 2 different ones listed on med. List, but should have refills on both.

## 2024-07-26 ENCOUNTER — TELEPHONE (OUTPATIENT)
Dept: PRIMARY CARE | Facility: CLINIC | Age: 79
End: 2024-07-26
Payer: COMMERCIAL

## 2024-07-26 DIAGNOSIS — E87.6 HYPOKALEMIA: ICD-10-CM

## 2024-07-26 RX ORDER — POTASSIUM CHLORIDE 1500 MG/1
20 TABLET, EXTENDED RELEASE ORAL 2 TIMES DAILY
Qty: 60 TABLET | Refills: 11 | Status: SHIPPED | OUTPATIENT
Start: 2024-07-26

## 2024-07-26 NOTE — TELEPHONE ENCOUNTER
Script was called in for Alannah 2 reader, however the sensor was called in for Alannah 14 day. I called and spoke with pharmacy, they require clarification. Please call pharmacy to clarify. Pt is out of sensors.   Pharmacy    [id number] - [place], [place] - [address]  [address] [facility] [address]  Phone: [phone]  Fax: [phone]

## 2024-07-29 ENCOUNTER — TELEPHONE (OUTPATIENT)
Dept: PRIMARY CARE | Facility: CLINIC | Age: 79
End: 2024-07-29
Payer: COMMERCIAL

## 2024-07-29 DIAGNOSIS — E11.9 TYPE 2 DIABETES MELLITUS WITHOUT COMPLICATION, WITHOUT LONG-TERM CURRENT USE OF INSULIN (MULTI): ICD-10-CM

## 2024-07-29 RX ORDER — FLASH GLUCOSE SENSOR
KIT MISCELLANEOUS
Qty: 1 EACH | Refills: 3 | Status: SHIPPED | OUTPATIENT
Start: 2024-07-29

## 2024-07-29 NOTE — TELEPHONE ENCOUNTER
[facility] Pharmacy asking about her script for Freestyle Alannah 2 sensors, just 1 qty, if this is not correct or are needing more than just one. Please call to discuss

## 2024-07-29 NOTE — TELEPHONE ENCOUNTER
Spoke to Bruna Georges and let her know it was ok to dispense 2 sensors at a time. Nothing further needed.

## 2024-08-01 ENCOUNTER — APPOINTMENT (OUTPATIENT)
Dept: CARDIAC REHAB | Facility: HOSPITAL | Age: 79
End: 2024-08-01
Payer: COMMERCIAL

## 2024-08-01 DIAGNOSIS — E11.22 TYPE 2 DIABETES MELLITUS WITH STAGE 3B CHRONIC KIDNEY DISEASE, WITH LONG-TERM CURRENT USE OF INSULIN (MULTI): ICD-10-CM

## 2024-08-01 DIAGNOSIS — Z79.4 TYPE 2 DIABETES MELLITUS WITH STAGE 3B CHRONIC KIDNEY DISEASE, WITH LONG-TERM CURRENT USE OF INSULIN (MULTI): ICD-10-CM

## 2024-08-01 DIAGNOSIS — E11.9 TYPE 2 DIABETES MELLITUS WITHOUT COMPLICATION, WITHOUT LONG-TERM CURRENT USE OF INSULIN (MULTI): ICD-10-CM

## 2024-08-01 DIAGNOSIS — N18.32 TYPE 2 DIABETES MELLITUS WITH STAGE 3B CHRONIC KIDNEY DISEASE, WITH LONG-TERM CURRENT USE OF INSULIN (MULTI): ICD-10-CM

## 2024-08-01 DIAGNOSIS — I50.30 DIASTOLIC HEART FAILURE, UNSPECIFIED HF CHRONICITY (MULTI): Primary | ICD-10-CM

## 2024-08-01 DIAGNOSIS — E03.9 HYPOTHYROIDISM, UNSPECIFIED TYPE: ICD-10-CM

## 2024-08-01 LAB
ANION GAP SERPL CALC-SCNC: 11 MMOL/L (ref 10–20)
BUN SERPL-MCNC: 17 MG/DL (ref 6–23)
CALCIUM SERPL-MCNC: 9.3 MG/DL (ref 8.6–10.3)
CHLORIDE SERPL-SCNC: 101 MMOL/L (ref 98–107)
CO2 SERPL-SCNC: 32 MMOL/L (ref 21–32)
CREAT SERPL-MCNC: 1.43 MG/DL (ref 0.5–1.05)
EGFRCR SERPLBLD CKD-EPI 2021: 37 ML/MIN/1.73M*2
EST. AVERAGE GLUCOSE BLD GHB EST-MCNC: 180 MG/DL
GLUCOSE SERPL-MCNC: 166 MG/DL (ref 74–99)
HBA1C MFR BLD: 7.9 %
POTASSIUM SERPL-SCNC: 4.3 MMOL/L (ref 3.5–5.3)
SODIUM SERPL-SCNC: 140 MMOL/L (ref 136–145)
T4 FREE SERPL-MCNC: 2.01 NG/DL (ref 0.61–1.12)
TSH SERPL-ACNC: 0.37 MIU/L (ref 0.44–3.98)

## 2024-08-01 PROCEDURE — 99212 OFFICE O/P EST SF 10 MIN: CPT

## 2024-08-01 PROCEDURE — 84443 ASSAY THYROID STIM HORMONE: CPT

## 2024-08-01 PROCEDURE — 84439 ASSAY OF FREE THYROXINE: CPT

## 2024-08-01 PROCEDURE — 80048 BASIC METABOLIC PNL TOTAL CA: CPT

## 2024-08-01 PROCEDURE — 83036 HEMOGLOBIN GLYCOSYLATED A1C: CPT | Mod: SAMLAB

## 2024-08-01 PROCEDURE — 36415 COLL VENOUS BLD VENIPUNCTURE: CPT

## 2024-08-01 RX ORDER — AMOXICILLIN 250 MG
1 CAPSULE ORAL AS NEEDED
COMMUNITY

## 2024-08-01 RX ORDER — DAPAGLIFLOZIN 5 MG/1
10 TABLET, FILM COATED ORAL EVERY 24 HOURS
Qty: 60 TABLET | Refills: 11 | Status: SHIPPED | OUTPATIENT
Start: 2024-08-01 | End: 2024-08-01 | Stop reason: WASHOUT

## 2024-08-01 RX ORDER — DAPAGLIFLOZIN 10 MG/1
10 TABLET, FILM COATED ORAL EVERY 24 HOURS
Qty: 30 TABLET | Refills: 11 | OUTPATIENT
Start: 2024-08-01 | End: 2025-08-01

## 2024-08-01 NOTE — PROGRESS NOTES
"Maira arrived ambulatory to the [facility] for her scheduled visit for follow up of increasing Farxiga and stopping Lisinopril and starting Amlodipine. Maira states she is feeling \" good \". He denies increased shortness of breath, PND, or Orthopnea. Eating and sleeping without distress. Home medications reviewed. A BMP and OP labs were drawn today. The plan is for Maira to continue current medication regimen and follow up in 1 month. Reviewed signs and symptoms of increased heart failure and when to call for help.    Vitals:  BP: 145/83           HR: 71           Resp: 18          SPO2: 95% on room air       Weight: 196.8lbs                         Previous Weight: 197.8lbs         Lung Sounds: clear    Edema: +1 right ankle, +1 left leg and ankle    JVD: absent     Labs: BMP and Op labs    Medications Administered: none    Next Appointment Date: September 6 2024@ 7:15am    Note in EMR for treating Physician: Dr. Baker    In-House Supervising Physician: Dr. Spicer    "

## 2024-08-01 NOTE — PATIENT INSTRUCTIONS
Continue medications at same dose    Diet  2000 mg (2 gram) sodium diet-Do not add salt to foods or cook with salt. Check labels for Sodium (Na)     Resources  [facility] [phone] Monday - Friday 7:00 am - 3:00 pm  Websites: [email or web address]; American Heart Association: www.heart.org    Special Instructions:  If you smoke-Quit!  If you are not able to contact your doctor and need immediate medical attention, call 911  If you are not able to keep you're scheduled appointment at the [facility], call [phone]  Watch for and report to your doctor all warning signs of Heart Failure (increased difficulty with breathing, 3-5 pound weight gain in one week or less, increased swelling, increased tiredness)  Weigh yourself each day at the same time with the same amount of clothes on. Record your weight log. Bring it with your to each visit    Patient verbalizes understanding for:  Low sodium diet:1500-2000mg daily of sodium, Fluid Restriction, Follow-up appointment with [facility], Weighing self daily, Medications dose and schedule, Signs of increased heart failure and Activity Recommendations

## 2024-08-09 ENCOUNTER — APPOINTMENT (OUTPATIENT)
Dept: PRIMARY CARE | Facility: CLINIC | Age: 79
End: 2024-08-09
Payer: COMMERCIAL

## 2024-08-09 VITALS
DIASTOLIC BLOOD PRESSURE: 60 MMHG | BODY MASS INDEX: 38.55 KG/M2 | OXYGEN SATURATION: 95 % | HEIGHT: 61 IN | HEART RATE: 68 BPM | WEIGHT: 204.2 LBS | SYSTOLIC BLOOD PRESSURE: 124 MMHG

## 2024-08-09 DIAGNOSIS — R60.0 PEDAL EDEMA: ICD-10-CM

## 2024-08-09 DIAGNOSIS — M25.571 ACUTE RIGHT ANKLE PAIN: Primary | ICD-10-CM

## 2024-08-09 DIAGNOSIS — E03.9 HYPOTHYROIDISM, UNSPECIFIED TYPE: ICD-10-CM

## 2024-08-09 DIAGNOSIS — E11.9 TYPE 2 DIABETES MELLITUS WITHOUT COMPLICATION, WITHOUT LONG-TERM CURRENT USE OF INSULIN (MULTI): ICD-10-CM

## 2024-08-09 PROCEDURE — 1157F ADVNC CARE PLAN IN RCRD: CPT | Performed by: STUDENT IN AN ORGANIZED HEALTH CARE EDUCATION/TRAINING PROGRAM

## 2024-08-09 PROCEDURE — 99214 OFFICE O/P EST MOD 30 MIN: CPT | Performed by: STUDENT IN AN ORGANIZED HEALTH CARE EDUCATION/TRAINING PROGRAM

## 2024-08-09 PROCEDURE — 3074F SYST BP LT 130 MM HG: CPT | Performed by: STUDENT IN AN ORGANIZED HEALTH CARE EDUCATION/TRAINING PROGRAM

## 2024-08-09 PROCEDURE — 1160F RVW MEDS BY RX/DR IN RCRD: CPT | Performed by: STUDENT IN AN ORGANIZED HEALTH CARE EDUCATION/TRAINING PROGRAM

## 2024-08-09 PROCEDURE — 1159F MED LIST DOCD IN RCRD: CPT | Performed by: STUDENT IN AN ORGANIZED HEALTH CARE EDUCATION/TRAINING PROGRAM

## 2024-08-09 PROCEDURE — 3078F DIAST BP <80 MM HG: CPT | Performed by: STUDENT IN AN ORGANIZED HEALTH CARE EDUCATION/TRAINING PROGRAM

## 2024-08-09 ASSESSMENT — PATIENT HEALTH QUESTIONNAIRE - PHQ9
2. FEELING DOWN, DEPRESSED OR HOPELESS: NOT AT ALL
SUM OF ALL RESPONSES TO PHQ9 QUESTIONS 1 AND 2: 0
1. LITTLE INTEREST OR PLEASURE IN DOING THINGS: NOT AT ALL

## 2024-08-09 NOTE — PROGRESS NOTES
Subjective   Patient ID: Maira Shearer is a 79 y.o. female who presents for Follow-up (PT is here today for 3 month fuv. PT did get her labs drawn, reports she fell last month and her herself. Reports her legs are swollen. ).    HPI    Reports that she sustained a fall in about a month ago.   Since then she has been having ankle pain. She fell down due to dizziness.   Plan: xray ordered.     Pedal edema: Patient has significant pedal edema bilaterally.  Has history of heart failure and is on bumetanide.  She was recommended to take extra dose of bumetanide when she gains 2 pounds weight.  She has gained 2 extra pounds in the last 1 week.  Recommended to start taking extra dose of bumetanide until she loses 2 pounds weight.    Reports that she was previously started on Farxiga which caused her to have dizziness.  Due to this she had sustained a fall.  This is now discontinued.    Discussed results with the patient.    Hypothyroidism:TSH is mildly low. Reducing the dose of levothyroxine.     Review of Systems  ROS negative except discussed above in HPI.    Vitals:    08/09/24 1007   BP: 124/60   Pulse: 68   SpO2: 95%     Objective   Physical Exam  Constitutional:       Appearance: Normal appearance.   Neurological:      Mental Status: She is alert.           Assessment/Plan   Maira was seen today for follow-up.  Diagnoses and all orders for this visit:  Acute right ankle pain (Primary)  -     XR ankle right 3+ views; Future  Type 2 diabetes mellitus without complication, without long-term current use of insulin (Multi)  Hypothyroidism, unspecified type  -     levothyroxine (Synthroid, Levoxyl) 125 mcg tablet; Take 1 tablet (125 mcg) by mouth early in the morning.. Take on an empty stomach at the same time each day, either 30 to 60 minutes prior to breakfast  Pedal edema      Follow up in 3 months.     Rik Farr MD MPH

## 2024-08-15 ENCOUNTER — HOSPITAL ENCOUNTER (OUTPATIENT)
Dept: CARDIOLOGY | Facility: HOSPITAL | Age: 79
Discharge: HOME | End: 2024-08-15
Payer: COMMERCIAL

## 2024-08-15 DIAGNOSIS — Z95.0 PACEMAKER: ICD-10-CM

## 2024-08-15 DIAGNOSIS — I48.21 PERMANENT ATRIAL FIBRILLATION (MULTI): Primary | ICD-10-CM

## 2024-08-15 PROCEDURE — 93280 PM DEVICE PROGR EVAL DUAL: CPT

## 2024-08-15 RX ORDER — LEVOTHYROXINE SODIUM 125 UG/1
125 TABLET ORAL DAILY
Qty: 30 TABLET | Refills: 11 | Status: SHIPPED | OUTPATIENT
Start: 2024-08-15 | End: 2025-08-15

## 2024-08-20 DIAGNOSIS — I44.2 ATRIOVENTRICULAR BLOCK, COMPLETE (MULTI): ICD-10-CM

## 2024-08-20 DIAGNOSIS — Z95.0 PACEMAKER: ICD-10-CM

## 2024-08-26 PROBLEM — E11.9 TYPE 2 DIABETES MELLITUS (MULTI): Status: ACTIVE | Noted: 2024-04-25

## 2024-08-26 PROBLEM — D64.9 ANEMIA: Status: RESOLVED | Noted: 2024-08-26 | Resolved: 2024-08-26

## 2024-08-26 PROBLEM — D50.9 IRON DEFICIENCY ANEMIA: Status: ACTIVE | Noted: 2024-08-26

## 2024-08-26 PROBLEM — I50.32 CHRONIC DIASTOLIC (CONGESTIVE) HEART FAILURE (MULTI): Status: ACTIVE | Noted: 2021-07-16

## 2024-08-26 PROBLEM — M10.9: Status: ACTIVE | Noted: 2024-08-26

## 2024-08-26 PROBLEM — I48.0 PAROXYSMAL ATRIAL FIBRILLATION (MULTI): Status: ACTIVE | Noted: 2017-04-18

## 2024-08-26 PROBLEM — E03.9 PRIMARY HYPOTHYROIDISM: Status: ACTIVE | Noted: 2021-07-16

## 2024-08-26 PROBLEM — D64.9 ANEMIA: Status: ACTIVE | Noted: 2024-08-26

## 2024-08-26 PROBLEM — E66.812 CLASS 2 OBESITY WITH BODY MASS INDEX (BMI) OF 38.0 TO 38.9 IN ADULT: Status: ACTIVE | Noted: 2024-08-26

## 2024-08-26 PROBLEM — E78.2 MIXED DYSLIPIDEMIA: Status: ACTIVE | Noted: 2024-08-26

## 2024-08-26 PROBLEM — N18.9 CHRONIC KIDNEY DISEASE: Status: RESOLVED | Noted: 2024-08-26 | Resolved: 2024-08-26

## 2024-08-26 PROBLEM — M17.11 PRIMARY LOCALIZED OSTEOARTHRITIS OF RIGHT KNEE: Status: ACTIVE | Noted: 2024-08-26

## 2024-08-26 PROBLEM — N18.9 CHRONIC KIDNEY DISEASE: Status: ACTIVE | Noted: 2024-08-26

## 2024-08-26 PROBLEM — E66.9 OBESITY WITH BODY MASS INDEX 30 OR GREATER: Status: ACTIVE | Noted: 2024-08-26

## 2024-08-27 ENCOUNTER — APPOINTMENT (OUTPATIENT)
Dept: CARDIOLOGY | Facility: CLINIC | Age: 79
End: 2024-08-27
Payer: COMMERCIAL

## 2024-08-27 VITALS
BODY MASS INDEX: 38.48 KG/M2 | OXYGEN SATURATION: 98 % | SYSTOLIC BLOOD PRESSURE: 126 MMHG | HEIGHT: 61 IN | DIASTOLIC BLOOD PRESSURE: 68 MMHG | WEIGHT: 203.8 LBS | HEART RATE: 70 BPM

## 2024-08-27 DIAGNOSIS — I10 PRIMARY HYPERTENSION: Primary | ICD-10-CM

## 2024-08-27 DIAGNOSIS — Z95.0 PACEMAKER: ICD-10-CM

## 2024-08-27 DIAGNOSIS — I48.0 PAROXYSMAL ATRIAL FIBRILLATION (MULTI): ICD-10-CM

## 2024-08-27 DIAGNOSIS — I48.21 PERMANENT ATRIAL FIBRILLATION (MULTI): ICD-10-CM

## 2024-08-27 PROCEDURE — 3078F DIAST BP <80 MM HG: CPT | Performed by: STUDENT IN AN ORGANIZED HEALTH CARE EDUCATION/TRAINING PROGRAM

## 2024-08-27 PROCEDURE — 1036F TOBACCO NON-USER: CPT | Performed by: STUDENT IN AN ORGANIZED HEALTH CARE EDUCATION/TRAINING PROGRAM

## 2024-08-27 PROCEDURE — 99215 OFFICE O/P EST HI 40 MIN: CPT | Performed by: STUDENT IN AN ORGANIZED HEALTH CARE EDUCATION/TRAINING PROGRAM

## 2024-08-27 PROCEDURE — 93000 ELECTROCARDIOGRAM COMPLETE: CPT | Performed by: STUDENT IN AN ORGANIZED HEALTH CARE EDUCATION/TRAINING PROGRAM

## 2024-08-27 PROCEDURE — 3074F SYST BP LT 130 MM HG: CPT | Performed by: STUDENT IN AN ORGANIZED HEALTH CARE EDUCATION/TRAINING PROGRAM

## 2024-08-27 PROCEDURE — 1157F ADVNC CARE PLAN IN RCRD: CPT | Performed by: STUDENT IN AN ORGANIZED HEALTH CARE EDUCATION/TRAINING PROGRAM

## 2024-08-27 PROCEDURE — 1159F MED LIST DOCD IN RCRD: CPT | Performed by: STUDENT IN AN ORGANIZED HEALTH CARE EDUCATION/TRAINING PROGRAM

## 2024-08-27 RX ORDER — METOPROLOL TARTRATE 100 MG/1
50 TABLET ORAL 2 TIMES DAILY
Qty: 30 TABLET | Refills: 11 | Status: SHIPPED | OUTPATIENT
Start: 2024-08-27 | End: 2025-08-27

## 2024-08-27 RX ORDER — AMIODARONE HYDROCHLORIDE 200 MG/1
200 TABLET ORAL DAILY
Qty: 30 TABLET | Refills: 5 | Status: SHIPPED | OUTPATIENT
Start: 2024-08-27 | End: 2025-02-23

## 2024-08-30 ENCOUNTER — OFFICE VISIT (OUTPATIENT)
Dept: CARDIOLOGY | Facility: CLINIC | Age: 79
End: 2024-08-30
Payer: COMMERCIAL

## 2024-08-30 VITALS
OXYGEN SATURATION: 96 % | HEART RATE: 66 BPM | WEIGHT: 201.4 LBS | HEIGHT: 61 IN | DIASTOLIC BLOOD PRESSURE: 62 MMHG | SYSTOLIC BLOOD PRESSURE: 132 MMHG | BODY MASS INDEX: 38.02 KG/M2

## 2024-08-30 DIAGNOSIS — I50.32 CHRONIC DIASTOLIC HEART FAILURE (MULTI): Primary | ICD-10-CM

## 2024-08-30 DIAGNOSIS — Z95.0 PACEMAKER: ICD-10-CM

## 2024-08-30 PROCEDURE — 1157F ADVNC CARE PLAN IN RCRD: CPT | Performed by: STUDENT IN AN ORGANIZED HEALTH CARE EDUCATION/TRAINING PROGRAM

## 2024-08-30 PROCEDURE — 3078F DIAST BP <80 MM HG: CPT | Performed by: STUDENT IN AN ORGANIZED HEALTH CARE EDUCATION/TRAINING PROGRAM

## 2024-08-30 PROCEDURE — 99214 OFFICE O/P EST MOD 30 MIN: CPT | Performed by: STUDENT IN AN ORGANIZED HEALTH CARE EDUCATION/TRAINING PROGRAM

## 2024-08-30 PROCEDURE — 1159F MED LIST DOCD IN RCRD: CPT | Performed by: STUDENT IN AN ORGANIZED HEALTH CARE EDUCATION/TRAINING PROGRAM

## 2024-08-30 PROCEDURE — 1036F TOBACCO NON-USER: CPT | Performed by: STUDENT IN AN ORGANIZED HEALTH CARE EDUCATION/TRAINING PROGRAM

## 2024-08-30 PROCEDURE — 3075F SYST BP GE 130 - 139MM HG: CPT | Performed by: STUDENT IN AN ORGANIZED HEALTH CARE EDUCATION/TRAINING PROGRAM

## 2024-08-30 PROCEDURE — 1160F RVW MEDS BY RX/DR IN RCRD: CPT | Performed by: STUDENT IN AN ORGANIZED HEALTH CARE EDUCATION/TRAINING PROGRAM

## 2024-08-30 NOTE — PROGRESS NOTES
No chief complaint on file.    HPI:  I was requested by Dr. Farr to evaluate this patient in consultation for cardiac assessment.     Patient 79-year-old female with a medical history of S/P PPM (11/2023), hypertension, hyperlipidemia, diabetes, atrial fibrillation on Eliquis who was admitted and seen by me at [facility] on 5/17/2022 for runs of atrial fibrillation with ambulation.      Problem #1 paroxysmal atrial fibrillation  - On Eliquis 5 mg twice daily.  -She denies any symptoms. She denies any palpitations/shortness of breath with exertion/chest pain/orthopnea PND/lower extremity edema. Also says no to dizziness/lightheadedness/syncopal episodes.  -No bleeding complications on the Eliquis.     Problem #2 hypertension  -Currently on lisinopril 40 mg daily and amlodipine 5 mg daily  -Normotensive in clinic today     Problem #3 diabetes concurrent with hyperlipidemia  -On lovastatin 20 mg LDL was 51 mg/dL in 2022.      Problem #4 S/P PPM (11/29/2023)  - Recent PPM placement  - EKG on Afib      Patient returns today felling well. However, she states that she increases her weight weekly, and if improves with additional dose of Bumex. She is also non-compliant with salt restriction - for example, diet rich in sausages. She will be sxhedule for Left Atrial Appendage Closure (LAAC) in [place].     Past Medical History  Past Medical History:   Diagnosis Date    Encounter for full-term uncomplicated delivery ([facility])     Normal vaginal delivery    Encounter for gynecological examination (general) (routine) without abnormal findings 09/01/2020    Encounter for routine gynecological examination    Other conditions influencing health status     Menstruation    Other fecal abnormalities     Stool guaiac positive    Personal history of other diseases of the circulatory system     History of CHF (congestive heart failure)    Personal history of other medical treatment 10/06/2021    H/O mammogram        Past Surgical History  Past Surgical History:   Procedure Laterality Date    CT ABDOMEN PELVIS ANGIOGRAM W AND/OR WO IV CONTRAST  07/21/2023    CT ABDOMEN PELVIS ANGIOGRAM W AND/OR WO IV CONTRAST 7/21/2023 [facility] CT    INSERT / REPLACE / REMOVE PACEMAKER      OTHER SURGICAL HISTORY  10/17/2019    Throat surgery    OTHER SURGICAL HISTORY  10/17/2019    Cholecystectomy    OTHER SURGICAL HISTORY  12/12/2019    Shoulder surgery    OTHER SURGICAL HISTORY  02/19/2020    Knee surgery    OTHER SURGICAL HISTORY  09/22/2020    Knee replacement    OTHER SURGICAL HISTORY  12/12/2019    Carpal tunnel surgery    OTHER SURGICAL HISTORY  12/12/2019    Thyroidectomy    OTHER SURGICAL HISTORY  12/12/2019    Cataract surgery       Past Family History  Family History   Problem Relation Name Age of Onset    Diabetes Mother      Hypertension Mother      Diabetes Father      Hypertension Father         Allergy History  Allergies   Allergen Reactions    Fenofibrate Hives    Oxycodone-Acetaminophen Unknown     vomitting    Aspirin Rash    Metformin Rash       Past Social History  Social History     Socioeconomic History    Marital status:    Tobacco Use    Smoking status: Never    Smokeless tobacco: Never   Vaping Use    Vaping status: Never Used   Substance and Sexual Activity    Alcohol use: Never    Drug use: Never     Social Determinants of Health     Transportation Needs: No Transportation Needs (8/31/2023)    Received from [facility], [facility]    OASIS : Transportation     Lack of Transportation (Medical): No     Lack of Transportation (Non-Medical): No     Patient Unable or Declines to Respond: No       Social History     Tobacco Use   Smoking Status Never   Smokeless Tobacco Never       Review of Systems:  Constitutional: Denies any fever or chills  Eyes: Denies any eye pain or blurry vision  ENT: Denies any ear pain or hearing loss  Cardiovascular: The heart rate is not slow, the heart rate is not  "fast  Respiratory: Denies any asthma/wheezing  Gastrointestinal: Denies any daniela colored stools or fatty food intolerance  Genitourinary: Denies any blood in the urine or pelvic pain  Musculoskeletal: Denies any swelling in the joints or difficulty walking  Skin: Denies any skin lumps or skin lesions  Neurological: Denies any dizziness/tingling     Objective Data:  Last Recorded Vitals:  Vitals:    08/30/24 1105   BP: 132/62   Pulse: 66   SpO2: 96%   Weight: 91.4 kg (201 lb 6.4 oz)   Height: 1.549 m (5' 1\")       Last Labs:  CBC - 5/1/2024: 11:53 AM  7.3 13.6 162    41.7      CMP - 8/1/2024:  7:22 AM  9.3 7.2 25 --- 1.5   _ 4.1 14 138      PTT - No results in last year.  _   _ _     TROPHS   Date/Time Value Ref Range Status   07/21/2023 11:20 AM 26 0 - 13 ng/L Final     Comment:     .  Less than 99th percentile of normal range cutoff-  Female and children under 18 years old <14 ng/L; Male <21 ng/L: Negative  Repeat testing should be performed if clinically indicated.   .  Female and children under 18 years old 14-50 ng/L; Male 21-50 ng/L:  Consistent with possible cardiac damage and possible increased clinical   risk. Serial measurements may help to assess extent of myocardial damage.   .  >50 ng/L: Consistent with cardiac damage, increased clinical risk and  myocardial infarction. Serial measurements may help assess extent of   myocardial damage.   .   NOTE: Children less than 1 year old may have higher baseline troponin   levels and results should be interpreted in conjunction with the overall   clinical context.   .  NOTE: Troponin I testing is performed using a different   testing methodology at [facility] than at other   [facility]. Direct result comparisons should only   be made within the same method.       BNP   Date/Time Value Ref Range Status   06/21/2024 09:15  0 - 99 pg/mL Final   05/01/2024 11:53  0 - 99 pg/mL Final     HGBA1C   Date/Time Value Ref Range Status " "  08/01/2024 07:23 AM 7.9 see below % Final   05/01/2024 11:53 AM 8.1 see below % Final     LDLCALC   Date/Time Value Ref Range Status   05/01/2024 11:53 AM 39 <=99 mg/dL Final     Comment:                                 Near   Borderline      AGE      Desirable  Optimal    High     High     Very High     0-19 Y     0 - 109     ---    110-129   >/= 130     ----    20-24 Y     0 - 119     ---    120-159   >/= 160     ----      >24 Y     0 -  99   100-129  130-159   160-189     >/=190       VLDL   Date/Time Value Ref Range Status   05/01/2024 11:53 AM 66 0 - 40 mg/dL Final   06/23/2023 07:25 AM 22 0 - 40 mg/dL Final   07/20/2022 07:25 AM 25 0 - 40 mg/dL Final   06/01/2021 12:05 PM 57 0 - 40 mg/dL Final        Patient Medications:  Outpatient Encounter Medications as of 8/30/2024   Medication Sig Dispense Refill    acetaminophen 325 mg chewable tablet Chew if needed.      amiodarone (Pacerone) 200 mg tablet Take 1 tablet (200 mg) by mouth once daily. 30 tablet 5    amLODIPine (Norvasc) 10 mg tablet Take 1 tablet (10 mg) by mouth once daily. 30 tablet 11    apixaban (Eliquis) 5 mg tablet Take 1 tablet (5 mg) by mouth 2 times a day. 180 tablet 1    aspirin 81 mg EC tablet Take 1 tablet (81 mg) by mouth once daily.      BD Ultra-Fine Micro Pen Needle 32 gauge x 1/4\" needle Up to three times daily 100 each 3    bumetanide (Bumex) 1 mg tablet Take 1 tablet (1 mg) by mouth once daily. Take a second tablet if your weight goes up by 2 pounds. 180 tablet 3    dextran 70-hypromellose (Bion Tears) 0.1-0.3 % ophthalmic solution Administer 1 drop into both eyes 3 times a day as needed for dry eyes. (Patient taking differently: Administer 1 drop into both eyes 3 times a day as needed for dry eyes. Taking twice a day) 15 mL 11    flash glucose scanning reader (FreeStyle Alannah 2 Quinter) misc Use as instructed 1 each 1    flash glucose sensor kit (FreeStyle Alannah 2 Sensor) kit Use as instructed 1 each 3    insulin lispro " protamin-lispro (HumaLOG Mix 75-25 KwikPen) 100 unit/mL (75-25) injection INJECT 15 UNITS SUBCUTANEOUSLY IN THE MORNING and 15 UNITS NIGHTLY 9 mL 3    levothyroxine (Synthroid, Levoxyl) 125 mcg tablet Take 1 tablet (125 mcg) by mouth early in the morning.. Take on an empty stomach at the same time each day, either 30 to 60 minutes prior to breakfast 30 tablet 11    loratadine (Claritin) 10 mg tablet Take 1 tablet (10 mg) by mouth 1 time if needed.      lovastatin (Mevacor) 20 mg tablet Take 1 tablet (20 mg) by mouth once daily at bedtime. 90 tablet 3    metoprolol tartrate (Lopressor) 100 mg tablet Take 0.5 tablets (50 mg) by mouth 2 times a day. 30 tablet 11    multivitamin tablet Take by mouth.      potassium chloride CR 20 mEq ER tablet Take 1 tablet (20 mEq) by mouth 2 times a day. 60 tablet 11    sennosides-docusate sodium (Melissa-Colace) 8.6-50 mg tablet Take 1 tablet by mouth if needed for constipation.      [DISCONTINUED] metoprolol tartrate (Lopressor) 100 mg tablet Take 1 tablet (100 mg) by mouth 2 times a day. 60 tablet 11     No facility-administered encounter medications on file as of 8/30/2024.       Physical Exam:  General: alert, oriented and in no acute distress  HEENT: NC/AT; EOMI; PERRLA, external ear is normal  Neck: supple; trachea midline; no masses; no JVD  Chest: clear breath sounds bilaterally; no wheezing  Cardio: regular rhythm, S1S2 normal, no murmurs  Abdomen: Soft, non-tender, non-distension, no organomegaly  Extremities: Edema 1+/3+ LE b/l  Neuro: Grossly intact     Psychiatric: Normal mood and affect     Past Cardiology Results (Last 3 Years):  EKG:  ECG 12 lead (Clinic Performed) 08/27/2024      ECG 12 lead (Clinic Performed) 12/04/2023    Echo:  Echo Results:  Transthoracic Echo (TTE) Complete 02/23/2024    [place] [address]  Phone [phone], Fax [phone]    TRANSTHORACIC ECHOCARDIOGRAM REPORT      Patient Name:       OG GONZALEZ     Reading Physician:    [id number] Adolph Paul MD  Study Date:        2/23/2024            Ordering Provider:    [id number] JENNIFER SELLERS  MRN/PID:           [id number]             Fellow:  Accession#:        [id number]         Nurse:                Stacia Jensen RN  Date of Birth/Age: 1945 / 78 years  Sonographer:          Quique Amado RDCS  Gender:            F                    Additional Staff:  Height:            154.94 cm            Admit Date:  Weight:            92.53 kg             Admission Status:     Outpatient  BSA / BMI:         1.91 m2 / 38.55      Department Location:  [facility] Echo Lab  kg/m2  Blood Pressure: 82 /52 mmHg    Study Type:    TRANSTHORACIC ECHO (TTE) COMPLETE  Diagnosis/ICD: Unspecified atrial fibrillation-I48.91  CPT Codes:     Echo Complete w Full Doppler-01860  Study Detail: The following Echo studies were performed: 2D, M-Mode, Doppler and  color flow. Definity used as a contrast agent for endocardial  border definition. Total contrast used for this procedure was  2.00cc mL via IV push.      PHYSICIAN INTERPRETATION:  Left Ventricle: Left ventricular systolic function is normal, with an estimated ejection fraction of 55%. There are no regional wall motion abnormalities. The left ventricular cavity size is normal. Left ventricular diastolic filling was indeterminate.  Left Atrium: The left atrium is mildly dilated.  Right Ventricle: The right ventricle is normal in size. There is reduced right ventricular systolic function.  Right Atrium: The right atrium is normal in size.  Aortic Valve: The aortic valve was not well visualized. There is no evidence of aortic valve regurgitation. The peak instantaneous gradient of the aortic valve is 5.0 mmHg. The mean gradient of the aortic valve is 3.0 mmHg.  Mitral Valve: The mitral valve is normal in structure. There is mild mitral valve regurgitation.  Tricuspid Valve: The tricuspid valve is structurally normal.  Tricuspid regurgitation was not assessed.  Pulmonic Valve: The pulmonic valve is not well visualized. There is moderate pulmonic valve regurgitation.  Pericardium: There is no pericardial effusion noted.  Aorta: The aortic root is normal.  Systemic Veins: The inferior vena cava appears to be of normal size. There is IVC inspiratory collapse greater than 50%.      CONCLUSIONS:  1. Left ventricular systolic function is normal with a 55% estimated ejection fraction.  2. There is reduced right ventricular systolic function.  3. Moderate pulmonic valve regurgitation.  4. Calculated pulmonary artery systolic pressure is 46 mmHg suggestive of pulmonary hypertension.  5. Poorly visualized anatomical structures due to suboptimal image quality.    QUANTITATIVE DATA SUMMARY:  2D MEASUREMENTS:  Normal Ranges:  Ao Root d:     2.60 cm    (2.0-3.7cm)  LAs:           4.60 cm    (2.7-4.0cm)  IVSd:          1.31 cm    (0.6-1.1cm)  LVPWd:         1.00 cm    (0.6-1.1cm)  LVIDd:         5.01 cm    (3.9-5.9cm)  LVIDs:         3.96 cm  LV Mass Index: 116.6 g/m2  LV % FS        21.0 %    LA VOLUME:  Normal Ranges:  LA Vol A4C:        24.6 ml    (22+/-6mL/m2)  LA Vol A2C:        35.9 ml  LA Vol BP:         30.3 ml  LA Vol Index A4C:  12.9ml/m2  LA Vol Index A2C:  18.9 ml/m2  LA Vol Index BP:   15.9 ml/m2  LA Area A4C:       11.9 cm2  LA Area A2C:       14.7 cm2  LA Major Axis A4C: 4.9 cm  LA Major Axis A2C: 5.1 cm  LA Volume Index:   12.6 ml/m2  LA Vol A4C:        24.0 ml  LA Vol A2C:        36.0 ml    LV SYSTOLIC FUNCTION BY 2D PLANIMETRY (MOD):  Normal Ranges:  EF-A4C View: 53.8 % (>=55%)  EF-A2C View: 54.1 %  EF-Biplane:  52.3 %    LV DIASTOLIC FUNCTION:  Normal Ranges:  MV Peak E:    1.13 m/s (0.7-1.2 m/s)  MV Peak A:    0.52 m/s (0.42-0.7 m/s)  E/A Ratio:    2.17     (1.0-2.2)  MV lateral e' 0.09 m/s  MV medial e'  0.07 m/s    MITRAL VALVE:  Normal Ranges:  MV DT: 211 msec (150-240msec)    AORTIC VALVE:  Normal Ranges:  AoV Vmax:                 1.12 m/s (<=1.7m/s)  AoV Peak P.0 mmHg (<20mmHg)  AoV Mean PG:             3.0 mmHg (1.7-11.5mmHg)  LVOT Max Dwayne:            0.89 m/s (<=1.1m/s)  AoV VTI:                 29.50 cm (18-25cm)  LVOT VTI:                15.80 cm  LVOT Diameter:           1.50 cm  (1.8-2.4cm)  AoV Area, VTI:           0.95 cm2 (2.5-5.5cm2)  AoV Area,Vmax:           1.40 cm2 (2.5-4.5cm2)  AoV Dimensionless Index: 0.54      RIGHT VENTRICLE:  TAPSE: 15.2 mm  RV s'  0.07 m/s    TRICUSPID VALVE/RVSP:  Normal Ranges:  Peak TR Velocity: 3.68 m/s  RV Syst Pressure: 57.2 mmHg (< 30mmHg)    PULMONIC VALVE:  Normal Ranges:  PV Accel Time: 81 msec  (>120ms)  PIEDV:         1.19 m/s  PADP:          8.7 mmHg      [id number] Adolph Paul MD  Electronically signed on 2024 at 12:55:07 PM        ** Final **     Cath:  No results found for this or any previous visit from the past 1095 days.    CV NCDR CATHPCI V5 COLLECTION FORM   Stress Test:  No results found for this or any previous visit from the past 1095 days.    Cardiac Imaging:  No results found for this or any previous visit from the past 1095 days.       Assessment/Plan     In summary, Mrs. Shearer is an 79-year-old female with a medical history of S/P PPM (), hypertension, hyperlipidemia, diabetes, atrial fibrillation on Eliquis who was admitted and seen by me at [facility] on 2022 for runs of atrial fibrillation with ambulation.     Assessment     # Heart Failure with preserved LVEF (LVEF ~55%) / S/P PPM (2023)  - Patient returns today complaining of worsening SOB, with edema in both legs after PPM placement   - Last echo from  with normal LVEF  - New echocardiogram (2024):   1. Left ventricular systolic function is normal with a 55% estimated ejection fraction.   2. There is reduced right ventricular systolic function.   3. Moderate pulmonic valve regurgitation.   4. Calculated pulmonary artery systolic pressure is 46 mmHg  suggestive of pulmonary hypertension.   5. Poorly visualized anatomical structures due to suboptimal image quality.  - Patient have not started higher dose of Bumex as suggested before.  - Will increase Bumex to 2mg daily   - Keep Lisinopril 40mg daily  - Patient returns today felling well. However, she states that she increased her weight in 3lb this week and is feeling a liitle bit more shorness of breath than usual. She is also non-compliant with salt restriction - for example, diet rich in sausages.  - Patient tachycardic today - will increase Metoprolol tartrate to 100mg BID.  - Will refer the patient to the heart failure clinic - patient overloaded today, suggested to take an extra pill of Bumex for the next 3 days.  - Follow up in 6 months.     # Paroxysmal atrial fibrillation with history of recurrent falls  - Elevated [id number] score of 3 which implies higher risk for thromboembolic events yearly, therefore should continue on stroke prophylaxis with DOAC - Eliquis 5mg BID.  - Patient is currently asymptomatic on rate control strategy with Metoprolol and Eliquis.   - Prior EKG showing atrial fibrillation.  - Prior echocardiogram shows normal left ventricle ejection fraction  - We had a thorough conversation about the triggers for atrial fibrillation, including alcohol, caffeine and chocolate.  - Counseled to exercise for better conditioning, for losing weight and to lower the baseline heart rate.  - Patient evaluated by EP team - will be referred to Left Atrial Appendage Closure (LAAC) in [place].     # Hypertension  - Hypertensive on current regimen  - Keep Amlodipine 5mg daily, Metoprolol tartrate 100mg BID  - Keep Lisinopril 40mg daily.     # Diabetes & Hyperlipidemia  - LDL 39, HDL 30, Tri 329.  - Controlled by PCP.  - Keep home medication with Lovastatin 20mg daily.  - Patient allergic to Fenofibrate.   - Counseled on healthy diet and regular exercise.      # Hypothyroidism  - Keep Levothyroxine  137mcg daily     # S/P PPM (11/29/2023)  - Recent PPM placement  - EKG on Afib      We have discussed the most common side effects of the prescribed medications, indications, drug interactions, risks, complications, and alternatives of medications/therapeutics were explained and discussed. The patient has been requested to monitor closely for any untoward side effects or complications of medications. The patient has been strongly advised to be compliant with the recommendations, all the questions and concerns have been addressed. The patient has been also instructed to call, to return sooner or to go to the emergency department if symptoms persist or get worsen. The patient voiced understanding and denies any further questions at this time.      This note was transcribed using the Dragon Dictation system. There may be grammatical, punctuation, or verbiage errors that occur with voice recognition programs.     Counseling greater than 50% of visit regarding all cardiac issues.     Thank you for allowing me to participate in the care of this patient. Please do not hesitate to contact me with any further questions or concerns.     Benjamin Baker MD  Cardiology

## 2024-09-06 ENCOUNTER — APPOINTMENT (OUTPATIENT)
Dept: CARDIAC REHAB | Facility: HOSPITAL | Age: 79
End: 2024-09-06
Payer: COMMERCIAL

## 2024-09-06 VITALS
BODY MASS INDEX: 38.21 KG/M2 | HEART RATE: 61 BPM | RESPIRATION RATE: 18 BRPM | DIASTOLIC BLOOD PRESSURE: 81 MMHG | WEIGHT: 202.2 LBS | SYSTOLIC BLOOD PRESSURE: 139 MMHG

## 2024-09-06 DIAGNOSIS — I50.30 DIASTOLIC HEART FAILURE, UNSPECIFIED HF CHRONICITY (MULTI): Primary | ICD-10-CM

## 2024-09-06 LAB
ANION GAP SERPL CALC-SCNC: 16 MMOL/L (ref 10–20)
BNP SERPL-MCNC: 291 PG/ML (ref 0–99)
BUN SERPL-MCNC: 20 MG/DL (ref 6–23)
CALCIUM SERPL-MCNC: 8.5 MG/DL (ref 8.6–10.3)
CHLORIDE SERPL-SCNC: 98 MMOL/L (ref 98–107)
CO2 SERPL-SCNC: 30 MMOL/L (ref 21–32)
CREAT SERPL-MCNC: 1.31 MG/DL (ref 0.5–1.05)
EGFRCR SERPLBLD CKD-EPI 2021: 41 ML/MIN/1.73M*2
GLUCOSE SERPL-MCNC: 183 MG/DL (ref 74–99)
POTASSIUM SERPL-SCNC: 3.7 MMOL/L (ref 3.5–5.3)
SODIUM SERPL-SCNC: 140 MMOL/L (ref 136–145)

## 2024-09-06 PROCEDURE — 80048 BASIC METABOLIC PNL TOTAL CA: CPT

## 2024-09-06 PROCEDURE — 99213 OFFICE O/P EST LOW 20 MIN: CPT

## 2024-09-06 PROCEDURE — 83880 ASSAY OF NATRIURETIC PEPTIDE: CPT

## 2024-09-06 PROCEDURE — 36415 COLL VENOUS BLD VENIPUNCTURE: CPT

## 2024-09-06 RX ORDER — DAPAGLIFLOZIN 10 MG/1
10 TABLET, FILM COATED ORAL EVERY 24 HOURS
COMMUNITY

## 2024-09-06 NOTE — PATIENT INSTRUCTIONS
Continue medications at same dose    Diet  2000 mg (2 gram) sodium diet-Do not add salt to foods or cook with salt. Check labels for Sodium (Na)     Resources  Heart Failure Clinic [phone] Monday - Friday 7:00 am - 3:00 pm  Websites: [email or web address] Secure; American Heart Association: www.heart.org    Special Instructions:  If you smoke-Quit!  If you are not able to contact your doctor and need immediate medical attention, call 911  If you are not able to keep you're scheduled appointment at the Heart Failure Clinic, call [phone]  Watch for and report to your doctor all warning signs of Heart Failure (increased difficulty with breathing, 3-5 pound weight gain in one week or less, increased swelling, increased tiredness)  Weigh yourself each day at the same time with the same amount of clothes on. Record your weight log. Bring it with your to each visit

## 2024-09-06 NOTE — PROGRESS NOTES
Maira arrived ambulatory to the Heart Failure Clinic for her scheduled visit. Since Maira's last visit she reports that she had a fall and had an appointment with Dr. Guerrero and he stopped her Farxiga. She then had an appointment with Dr. Conde and he decreased her Metoprolol to 50mg bid and start amiodarone. Maira reports that her weights have been increasing and her legs are a little more swollen. She denies increased shortness of breath, PND, or Orthopnea. Eating and sleeping without distress. Home medications reviewed. A BMP and BNP were drawn today and results were called to Luisa Yepez DNP. Luisa instructed for Maira to restart taking Farxiga and take Bumex 2mg x 7 days and return in 1 week.  Reviewed signs and symptoms of increased heart failure and when to call for help. Patient verbalizes understanding for: Low sodium diet: 2000 mg daily of sodium, follow-up appointment with [facility], weighing self daily, medications dose and schedule, and signs of increased heart failure.      Vitals:  BP: 139/81           HR: 61           Resp: 18          SPO2:  96% on room air      Weight: 202.2lbs                         Previous Weight:  196.8lbs        Lung Sounds: clear    Edema: +2 bl legs    JVD: absent     Labs: BMP BNP     Medications titration: restart Farxiga 10mg daily and increase Bumex to 2mg x 7days.    Next Appointment Date: September 13, 2024@ 8:30    Note in EMR for treating Physician: Dr. Baker    In-House Supervising Physician: Dr. Spicer

## 2024-09-10 ENCOUNTER — APPOINTMENT (OUTPATIENT)
Dept: CARDIOLOGY | Facility: CLINIC | Age: 79
End: 2024-09-10
Payer: COMMERCIAL

## 2024-09-13 ENCOUNTER — APPOINTMENT (OUTPATIENT)
Dept: CARDIAC REHAB | Facility: HOSPITAL | Age: 79
End: 2024-09-13
Payer: COMMERCIAL

## 2024-09-13 VITALS
OXYGEN SATURATION: 96 % | HEART RATE: 69 BPM | DIASTOLIC BLOOD PRESSURE: 63 MMHG | RESPIRATION RATE: 18 BRPM | SYSTOLIC BLOOD PRESSURE: 132 MMHG | WEIGHT: 201.5 LBS | BODY MASS INDEX: 38.07 KG/M2

## 2024-09-13 DIAGNOSIS — I50.32 CHRONIC DIASTOLIC (CONGESTIVE) HEART FAILURE (MULTI): Primary | ICD-10-CM

## 2024-09-13 DIAGNOSIS — I50.30 DIASTOLIC HEART FAILURE, UNSPECIFIED HF CHRONICITY (MULTI): Primary | ICD-10-CM

## 2024-09-13 DIAGNOSIS — R60.0 PEDAL EDEMA: ICD-10-CM

## 2024-09-13 LAB
ANION GAP SERPL CALC-SCNC: 12 MMOL/L (ref 10–20)
BUN SERPL-MCNC: 15 MG/DL (ref 6–23)
CALCIUM SERPL-MCNC: 8.5 MG/DL (ref 8.6–10.3)
CHLORIDE SERPL-SCNC: 99 MMOL/L (ref 98–107)
CO2 SERPL-SCNC: 30 MMOL/L (ref 21–32)
CREAT SERPL-MCNC: 1.42 MG/DL (ref 0.5–1.05)
EGFRCR SERPLBLD CKD-EPI 2021: 37 ML/MIN/1.73M*2
GLUCOSE SERPL-MCNC: 140 MG/DL (ref 74–99)
POTASSIUM SERPL-SCNC: 3.6 MMOL/L (ref 3.5–5.3)
SODIUM SERPL-SCNC: 137 MMOL/L (ref 136–145)

## 2024-09-13 PROCEDURE — 36415 COLL VENOUS BLD VENIPUNCTURE: CPT

## 2024-09-13 PROCEDURE — 99212 OFFICE O/P EST SF 10 MIN: CPT

## 2024-09-13 PROCEDURE — 80051 ELECTROLYTE PANEL: CPT

## 2024-09-13 RX ORDER — BUMETANIDE 2 MG/1
2 TABLET ORAL DAILY
Qty: 90 TABLET | Refills: 3 | Status: SHIPPED | OUTPATIENT
Start: 2024-09-13 | End: 2025-09-13

## 2024-09-13 RX ORDER — DAPAGLIFLOZIN 10 MG/1
10 TABLET, FILM COATED ORAL EVERY 24 HOURS
Qty: 90 TABLET | Refills: 3 | Status: SHIPPED | OUTPATIENT
Start: 2024-09-13 | End: 2025-09-13

## 2024-09-13 NOTE — PATIENT INSTRUCTIONS
Continue medications at same dose    Diet  2000 mg (2 gram) sodium diet-Do not add salt to foods or cook with salt. Check labels for Sodium (Na)     Resources  Heart Failure Clinic [phone] Monday - Friday 7:00 am - 3:00 pm  Websites: [email or web address]; American Heart Association: www.heart.org    Special Instructions:  If you smoke-Quit!  If you are not able to contact your doctor and need immediate medical attention, call 911  If you are not able to keep you're scheduled appointment at the Heart Failure Clinic, call [phone]  Watch for and report to your doctor all warning signs of Heart Failure (increased difficulty with breathing, 3-5 pound weight gain in one week or less, increased swelling, increased tiredness)  Weigh yourself each day at the same time with the same amount of clothes on. Record your weight log. Bring it with your to each visit

## 2024-09-13 NOTE — PROGRESS NOTES
"Maira arrived ambulatory to the Heart Failure Clinic for her scheduled visit for follow up of restarting farxiga and increasing Bumex to 2mg x7 days. Maira reports that she was not able to start the farxiga because she could not find the medication, but did take the increased dose of Bumex and states she is feeling \"better, I'm not as short of breath \". She denies increased shortness of breath, PND, or Orthopnea. Eating and sleeping without distress. Home medications reviewed. A BMP was drawn today and results were called to Luisa Yepez DNP. The plan for Maira is to continue taking Bumex 2mg daily and to start the farxiga 10mg daily. New prescriptions were sent to [facility] for Bumex 2mg and Farxiga 10mg. Maira will return in 1 month. Reviewed signs and symptoms of increased heart failure and when to call for help. Patient verbalizes understanding for: Low sodium diet: 2000 mg daily of sodium, follow-up appointment with [facility], weighing self daily, medications dose and schedule, and signs of increased heart failure.      Vitals:  BP: 132/63           HR: 69           Resp: 18          SPO2: 96% on room air       Weight: 201.5lbs                         Previous Weight: 202.2lbs         Lung Sounds: clear    Edema: +1 bl legs    JVD: absent     Labs: BMP    Medications Administered: none    Next Appointment Date: October 11, 2024@ 7:30am    Note in EMR for treating Physician: Luisa Yepez DNP    In-House Supervising Physician: Dr. Magana    "

## 2024-10-11 ENCOUNTER — APPOINTMENT (OUTPATIENT)
Dept: CARDIAC REHAB | Facility: HOSPITAL | Age: 79
End: 2024-10-11
Payer: COMMERCIAL

## 2024-10-11 VITALS
WEIGHT: 197.2 LBS | HEART RATE: 92 BPM | BODY MASS INDEX: 37.26 KG/M2 | DIASTOLIC BLOOD PRESSURE: 79 MMHG | RESPIRATION RATE: 18 BRPM | SYSTOLIC BLOOD PRESSURE: 131 MMHG | OXYGEN SATURATION: 93 %

## 2024-10-11 DIAGNOSIS — I50.30 DIASTOLIC HEART FAILURE, UNSPECIFIED HF CHRONICITY: Primary | ICD-10-CM

## 2024-10-11 LAB
ANION GAP SERPL CALC-SCNC: 17 MMOL/L (ref 10–20)
BUN SERPL-MCNC: 22 MG/DL (ref 6–23)
CALCIUM SERPL-MCNC: 9.2 MG/DL (ref 8.6–10.3)
CHLORIDE SERPL-SCNC: 99 MMOL/L (ref 98–107)
CO2 SERPL-SCNC: 29 MMOL/L (ref 21–32)
CREAT SERPL-MCNC: 1.66 MG/DL (ref 0.5–1.05)
EGFRCR SERPLBLD CKD-EPI 2021: 31 ML/MIN/1.73M*2
GLUCOSE SERPL-MCNC: 211 MG/DL (ref 74–99)
POTASSIUM SERPL-SCNC: 3.6 MMOL/L (ref 3.5–5.3)
SODIUM SERPL-SCNC: 141 MMOL/L (ref 136–145)

## 2024-10-11 PROCEDURE — 36415 COLL VENOUS BLD VENIPUNCTURE: CPT

## 2024-10-11 PROCEDURE — 80048 BASIC METABOLIC PNL TOTAL CA: CPT

## 2024-10-11 PROCEDURE — 99212 OFFICE O/P EST SF 10 MIN: CPT

## 2024-10-11 NOTE — PROGRESS NOTES
"Maira arrived ambulatory to the [facility] for her scheduled visit. Maira states she is feeling \"better, last week I was sick with the stomach flu, vomiting and pooping\". She denies increased shortness of breath, PND, or Orthopnea. Eating and sleeping without distress. Home medications reviewed with no changes. A BMP was drawn today. The plan for Maira is to continue current medication regimen and follow up in 1 month with OP labs and amiodarone labs. Current GDMT includes: Farxiga 10mg daily and Metoprolol Tartrate 50mg bid. She is also taking Bumex 2mg daily. Reviewed signs and symptoms of increased heart failure and when to call for help. Patient verbalizes understanding for: Low sodium diet: 2000 mg daily of sodium, follow-up appointment with [facility], weighing self daily, medications dose and schedule, and signs of increased heart failure.      Vitals:  BP: 131/79           HR: 92           Resp: 18          SPO2: 93% on room air       Weight: 197.2lbs                         Previous Weight: 201.5lbs         Lung Sounds: clear    Edema: +1 bl legs with compression socks    JVD: absent     Labs: BMP    Medications Administered: none    Next Appointment Date: November 15, 2024@ 7:30am    Note in EMR for treating Physician: Dr. Baker    In-House Supervising Physician: Dr. Magana  "

## 2024-10-11 NOTE — PATIENT INSTRUCTIONS
Continue medications at same dose    Diet  2000 mg (2 gram) sodium diet-Do not add salt to foods or cook with salt. Check labels for Sodium (Na)     Resources  [facility] [phone] Monday - Friday 7:00 am - 3:00 pm  Websites: www.[employer]; American Heart Association: www.heart.org    Special Instructions:  If you smoke-Quit!  If you are not able to contact your doctor and need immediate medical attention, call 911  If you are not able to keep you're scheduled appointment at the [facility], call [phone]  Watch for and report to your doctor all warning signs of Heart Failure (increased difficulty with breathing, 3-5 pound weight gain in one week or less, increased swelling, increased tiredness)  Weigh yourself each day at the same time with the same amount of clothes on. Record your weight log. Bring it with your to each visit

## 2024-10-17 DIAGNOSIS — I48.0 PAROXYSMAL ATRIAL FIBRILLATION (MULTI): ICD-10-CM

## 2024-10-17 RX ORDER — APIXABAN 5 MG/1
5 TABLET, FILM COATED ORAL 2 TIMES DAILY
Qty: 180 TABLET | Refills: 1 | Status: SHIPPED | OUTPATIENT
Start: 2024-10-17

## 2024-11-08 ENCOUNTER — APPOINTMENT (OUTPATIENT)
Dept: NEPHROLOGY | Facility: CLINIC | Age: 79
End: 2024-11-08
Payer: COMMERCIAL

## 2024-11-14 ENCOUNTER — HOSPITAL ENCOUNTER (OUTPATIENT)
Dept: CARDIOLOGY | Facility: CLINIC | Age: 79
Discharge: HOME | End: 2024-11-14
Payer: COMMERCIAL

## 2024-11-14 DIAGNOSIS — I44.2 ATRIOVENTRICULAR BLOCK, COMPLETE (MULTI): ICD-10-CM

## 2024-11-14 DIAGNOSIS — Z95.0 PACEMAKER: ICD-10-CM

## 2024-11-14 PROCEDURE — 93296 REM INTERROG EVL PM/IDS: CPT

## 2024-11-15 ENCOUNTER — APPOINTMENT (OUTPATIENT)
Dept: CARDIAC REHAB | Facility: HOSPITAL | Age: 79
End: 2024-11-15
Payer: COMMERCIAL

## 2024-11-15 ENCOUNTER — HOSPITAL ENCOUNTER (OUTPATIENT)
Dept: CARDIOLOGY | Facility: HOSPITAL | Age: 79
Discharge: HOME | End: 2024-11-15
Payer: COMMERCIAL

## 2024-11-15 ENCOUNTER — TELEPHONE (OUTPATIENT)
Dept: CARDIOLOGY | Facility: CLINIC | Age: 79
End: 2024-11-15

## 2024-11-15 VITALS
WEIGHT: 192.8 LBS | RESPIRATION RATE: 18 BRPM | OXYGEN SATURATION: 97 % | HEART RATE: 66 BPM | BODY MASS INDEX: 36.43 KG/M2 | DIASTOLIC BLOOD PRESSURE: 70 MMHG | SYSTOLIC BLOOD PRESSURE: 122 MMHG

## 2024-11-15 DIAGNOSIS — I48.0 PAROXYSMAL ATRIAL FIBRILLATION (MULTI): Primary | ICD-10-CM

## 2024-11-15 DIAGNOSIS — I50.32 CHRONIC DIASTOLIC (CONGESTIVE) HEART FAILURE: ICD-10-CM

## 2024-11-15 DIAGNOSIS — N18.32 STAGE 3B CHRONIC KIDNEY DISEASE (MULTI): ICD-10-CM

## 2024-11-15 LAB
ALBUMIN SERPL BCP-MCNC: 4.3 G/DL (ref 3.4–5)
ALP SERPL-CCNC: 178 U/L (ref 33–136)
ALT SERPL W P-5'-P-CCNC: 13 U/L (ref 7–45)
ANION GAP SERPL CALC-SCNC: 15 MMOL/L
APPEARANCE UR: CLEAR
AST SERPL W P-5'-P-CCNC: 27 U/L (ref 9–39)
ATRIAL RATE: 90 BPM
BACTERIA #/AREA URNS AUTO: ABNORMAL /HPF
BILIRUB SERPL-MCNC: 1.4 MG/DL (ref 0–1.2)
BILIRUB UR STRIP.AUTO-MCNC: NEGATIVE MG/DL
BUN SERPL-MCNC: 25 MG/DL (ref 6–23)
CALCIUM SERPL-MCNC: 8.5 MG/DL (ref 8.6–10.3)
CHLORIDE SERPL-SCNC: 96 MMOL/L (ref 98–107)
CO2 SERPL-SCNC: 35 MMOL/L (ref 21–32)
COLOR UR: ABNORMAL
CREAT SERPL-MCNC: 1.78 MG/DL (ref 0.5–1.05)
CREAT UR-MCNC: 46.4 MG/DL (ref 20–320)
EGFRCR SERPLBLD CKD-EPI 2021: 29 ML/MIN/1.73M*2
GLUCOSE SERPL-MCNC: 98 MG/DL (ref 74–99)
GLUCOSE UR STRIP.AUTO-MCNC: ABNORMAL MG/DL
HYALINE CASTS #/AREA URNS AUTO: ABNORMAL /LPF
KETONES UR STRIP.AUTO-MCNC: NEGATIVE MG/DL
LEUKOCYTE ESTERASE UR QL STRIP.AUTO: ABNORMAL
MICROALBUMIN UR-MCNC: 66.4 MG/L
MICROALBUMIN/CREAT UR: 143.1 UG/MG CREAT
MUCOUS THREADS #/AREA URNS AUTO: ABNORMAL /LPF
NITRITE UR QL STRIP.AUTO: NEGATIVE
P AXIS: 100 DEGREES
P OFFSET: 162 MS
P ONSET: 113 MS
PH UR STRIP.AUTO: 7.5 [PH]
POTASSIUM SERPL-SCNC: 2.8 MMOL/L (ref 3.5–5.3)
PR INTERVAL: 144 MS
PROT SERPL-MCNC: 7.4 G/DL (ref 6.4–8.2)
PROT UR STRIP.AUTO-MCNC: NEGATIVE MG/DL
Q ONSET: 185 MS
QRS COUNT: 15 BEATS
QRS DURATION: 186 MS
QT INTERVAL: 472 MS
QTC CALCULATION(BAZETT): 577 MS
QTC FREDERICIA: 540 MS
R AXIS: 250 DEGREES
RBC # UR STRIP.AUTO: NEGATIVE /UL
RBC #/AREA URNS AUTO: ABNORMAL /HPF
SODIUM SERPL-SCNC: 143 MMOL/L (ref 136–145)
SP GR UR STRIP.AUTO: 1.01
SQUAMOUS #/AREA URNS AUTO: ABNORMAL /HPF
T AXIS: 111 DEGREES
T OFFSET: 421 MS
T4 FREE SERPL-MCNC: 1.56 NG/DL (ref 0.61–1.12)
TSH SERPL-ACNC: 5.01 MIU/L (ref 0.44–3.98)
UROBILINOGEN UR STRIP.AUTO-MCNC: NORMAL MG/DL
VENTRICULAR RATE: 90 BPM
WBC #/AREA URNS AUTO: ABNORMAL /HPF

## 2024-11-15 PROCEDURE — 81003 URINALYSIS AUTO W/O SCOPE: CPT

## 2024-11-15 PROCEDURE — 82570 ASSAY OF URINE CREATININE: CPT | Mod: SAMLAB

## 2024-11-15 PROCEDURE — 36415 COLL VENOUS BLD VENIPUNCTURE: CPT

## 2024-11-15 PROCEDURE — 93005 ELECTROCARDIOGRAM TRACING: CPT

## 2024-11-15 PROCEDURE — 99213 OFFICE O/P EST LOW 20 MIN: CPT

## 2024-11-15 PROCEDURE — 84439 ASSAY OF FREE THYROXINE: CPT

## 2024-11-15 PROCEDURE — 93010 ELECTROCARDIOGRAM REPORT: CPT | Performed by: STUDENT IN AN ORGANIZED HEALTH CARE EDUCATION/TRAINING PROGRAM

## 2024-11-15 PROCEDURE — 84443 ASSAY THYROID STIM HORMONE: CPT

## 2024-11-15 PROCEDURE — 80053 COMPREHEN METABOLIC PANEL: CPT

## 2024-11-15 NOTE — TELEPHONE ENCOUNTER
Lab called today to report critical potassium level, 2.8    Last seen by Dr Conde on 8/27 and ordered labs. She is on amio 200 daily, amlod 10 daily, eliquis 5 bid, asa, bumex 1mg daily, lopressor 50 bid and Kcl 20 bid.

## 2024-11-15 NOTE — PROGRESS NOTES
"Maira arrived ambulatory to the [facility] for her scheduled visit. Maira states she is feeling \" pretty good, I am wearing cute compression socks from [employer] \". She denies increased shortness of breath, PND, or Orthopnea. Eating and sleeping without distress. Home medications reviewed. Op labs and Amiodarone protocol was completed with labs and EKG. Luisa Yepez notified of lab results. Luisa instructed for Maira to start taking Bumex 0.5mg daily and Potassium 40meq bid x 3 days. Maira will return in 4 days with repeat BMP. Current GDMT includes: Farxiga 10mg daily, Metoprolol Tartrate 50mg bid. Reviewed signs and symptoms of increased heart failure and when to call for help. Patient verbalizes understanding for: Low sodium diet: 2000 mg daily of sodium, follow-up appointment with [facility], weighing self daily, medications dose and schedule, and signs of increased heart failure.      Vitals:  BP: 122/70           HR: 66           Resp: 18          SPO2:97% on room air        Weight: 192.8lbs                         Previous Weight: 197.2lbs         Lung Sounds: clear    Edema: +1 bl legs with compression socks    JVD: absent     Labs: OP labs    Medications Administered: none    Next Appointment Date: November 19, 2024@ 7:30am    Note in EMR for treating Physician: Dr. Baker    In-House Supervising Physician: Dr. Magana  "

## 2024-11-15 NOTE — PATIENT INSTRUCTIONS
Diet  2000 mg (2 gram) sodium diet-Do not add salt to foods or cook with salt. Check labels for Sodium (Na)     Resources  [facility] [phone] Monday - Friday 7:00 am - 3:00 pm  Websites: www.[email or web address]; American Heart Association: www.heart.org    Special Instructions:  If you smoke-Quit!  If you are not able to contact your doctor and need immediate medical attention, call 911  If you are not able to keep you're scheduled appointment at the [facility], call [phone]  Watch for and report to your doctor all warning signs of Heart Failure (increased difficulty with breathing, 3-5 pound weight gain in one week or less, increased swelling, increased tiredness)  Weigh yourself each day at the same time with the same amount of clothes on. Record your weight log. Bring it with your to each visit

## 2024-11-19 ENCOUNTER — APPOINTMENT (OUTPATIENT)
Dept: CARDIAC REHAB | Facility: HOSPITAL | Age: 79
End: 2024-11-19
Payer: COMMERCIAL

## 2024-11-19 VITALS
RESPIRATION RATE: 18 BRPM | HEART RATE: 63 BPM | SYSTOLIC BLOOD PRESSURE: 126 MMHG | OXYGEN SATURATION: 96 % | BODY MASS INDEX: 37.2 KG/M2 | DIASTOLIC BLOOD PRESSURE: 74 MMHG | WEIGHT: 196.9 LBS

## 2024-11-19 DIAGNOSIS — I50.30 DIASTOLIC HEART FAILURE, UNSPECIFIED HF CHRONICITY: Primary | ICD-10-CM

## 2024-11-19 DIAGNOSIS — E87.6 HYPOKALEMIA: ICD-10-CM

## 2024-11-19 DIAGNOSIS — I48.0 PAROXYSMAL ATRIAL FIBRILLATION (MULTI): ICD-10-CM

## 2024-11-19 LAB
ANION GAP SERPL CALC-SCNC: 14 MMOL/L (ref 10–20)
BUN SERPL-MCNC: 17 MG/DL (ref 6–23)
CALCIUM SERPL-MCNC: 8.5 MG/DL (ref 8.6–10.3)
CHLORIDE SERPL-SCNC: 96 MMOL/L (ref 98–107)
CO2 SERPL-SCNC: 33 MMOL/L (ref 21–32)
CREAT SERPL-MCNC: 1.72 MG/DL (ref 0.5–1.05)
EGFRCR SERPLBLD CKD-EPI 2021: 30 ML/MIN/1.73M*2
GLUCOSE SERPL-MCNC: 143 MG/DL (ref 74–99)
MAGNESIUM SERPL-MCNC: 1.97 MG/DL (ref 1.6–2.4)
POTASSIUM SERPL-SCNC: 3.3 MMOL/L (ref 3.5–5.3)
SODIUM SERPL-SCNC: 140 MMOL/L (ref 136–145)

## 2024-11-19 PROCEDURE — 99213 OFFICE O/P EST LOW 20 MIN: CPT

## 2024-11-19 PROCEDURE — 36415 COLL VENOUS BLD VENIPUNCTURE: CPT

## 2024-11-19 PROCEDURE — 82374 ASSAY BLOOD CARBON DIOXIDE: CPT

## 2024-11-19 PROCEDURE — 83735 ASSAY OF MAGNESIUM: CPT

## 2024-11-19 RX ORDER — POTASSIUM CHLORIDE 1500 MG/1
20 TABLET, EXTENDED RELEASE ORAL 3 TIMES DAILY
Qty: 60 TABLET | Refills: 11 | Status: SHIPPED | OUTPATIENT
Start: 2024-11-19 | End: 2024-11-19 | Stop reason: SDUPTHER

## 2024-11-19 RX ORDER — POTASSIUM CHLORIDE 20 MEQ/1
20 TABLET, EXTENDED RELEASE ORAL 3 TIMES DAILY
Qty: 60 TABLET | Refills: 11 | Status: SHIPPED | OUTPATIENT
Start: 2024-11-19

## 2024-11-19 NOTE — PROGRESS NOTES
"Maira arrived ambulatory to the [facility] for her scheduled visit for follow up of increasing Potassium and decreasing Bumex. Maira reports that she did not change her Bumex dose and was still taking 2mg, but did take the extra of the Potassium. Maira states she is feeling \" great \". She denies increased shortness of breath, PND, or Orthopnea. Eating and sleeping without distress. Home medications reviewed. A BMP was drawn today and results were called to Luisa Yepez DNP. The plan for Maira is to continue taking Bumex 2mg daily, increase Potassium Chloride to 60meq daily, instructed Maira to take 2 tablets in AM and 1 tablet in PM. Maira voiced understanding. Reviewed signs and symptoms of increased heart failure and when to call for help. Patient verbalizes understanding for: Low sodium diet: 2000 mg daily of sodium, follow-up appointment with [facility], weighing self daily, medications dose and schedule, and signs of increased heart failure.      Vitals:  BP: 126/74           HR:63            Resp: 18          SPO2: 96% on room air       Weight: 196.9lbs                         Previous Weight: 192.8lbs         Lung Sounds: clear    Edema: +1 bl legs with compression socks.    JVD: absent     Labs: BMP, Magnesium    Medications titration: Potassium Chloride 60meq daily    Next Appointment Date: December 6, 2024@ 7:30am    Note in EMR for treating Physician: Dr. Baker    In-House Supervising Physician: Dr. Xie   "

## 2024-11-19 NOTE — PATIENT INSTRUCTIONS
Continue medications at same dose    Diet  2000 mg (2 gram) sodium diet-Do not add salt to foods or cook with salt. Check labels for Sodium (Na)     Resources  [facility] [phone] Monday - Friday 7:00 am - 3:00 pm  Websites: www.[employer]; American Heart Association: www.heart.org    Special Instructions:  If you smoke-Quit!  If you are not able to contact your doctor and need immediate medical attention, call 911  If you are not able to keep you're scheduled appointment at the [facility], call [phone]  Watch for and report to your doctor all warning signs of Heart Failure (increased difficulty with breathing, 3-5 pound weight gain in one week or less, increased swelling, increased tiredness)  Weigh yourself each day at the same time with the same amount of clothes on. Record your weight log. Bring it with your to each visit

## 2024-11-21 ENCOUNTER — APPOINTMENT (OUTPATIENT)
Dept: PRIMARY CARE | Facility: CLINIC | Age: 79
End: 2024-11-21
Payer: COMMERCIAL

## 2024-11-22 ENCOUNTER — APPOINTMENT (OUTPATIENT)
Dept: NEPHROLOGY | Facility: CLINIC | Age: 79
End: 2024-11-22
Payer: COMMERCIAL

## 2024-11-22 VITALS
HEIGHT: 61 IN | DIASTOLIC BLOOD PRESSURE: 68 MMHG | WEIGHT: 198.6 LBS | BODY MASS INDEX: 37.49 KG/M2 | SYSTOLIC BLOOD PRESSURE: 128 MMHG | HEART RATE: 64 BPM

## 2024-11-22 DIAGNOSIS — E11.22 TYPE 2 DIABETES MELLITUS WITH STAGE 3B CHRONIC KIDNEY DISEASE, WITH LONG-TERM CURRENT USE OF INSULIN (MULTI): ICD-10-CM

## 2024-11-22 DIAGNOSIS — N18.32 TYPE 2 DIABETES MELLITUS WITH STAGE 3B CHRONIC KIDNEY DISEASE, WITH LONG-TERM CURRENT USE OF INSULIN (MULTI): ICD-10-CM

## 2024-11-22 DIAGNOSIS — R60.0 PERIPHERAL EDEMA: ICD-10-CM

## 2024-11-22 DIAGNOSIS — Z79.4 TYPE 2 DIABETES MELLITUS WITH STAGE 3B CHRONIC KIDNEY DISEASE, WITH LONG-TERM CURRENT USE OF INSULIN (MULTI): ICD-10-CM

## 2024-11-22 DIAGNOSIS — E78.2 MIXED DYSLIPIDEMIA: ICD-10-CM

## 2024-11-22 DIAGNOSIS — I10 PRIMARY HYPERTENSION: Primary | ICD-10-CM

## 2024-11-22 DIAGNOSIS — N18.32 STAGE 3B CHRONIC KIDNEY DISEASE (MULTI): ICD-10-CM

## 2024-11-22 PROCEDURE — 1159F MED LIST DOCD IN RCRD: CPT | Performed by: CLINICAL NURSE SPECIALIST

## 2024-11-22 PROCEDURE — 99214 OFFICE O/P EST MOD 30 MIN: CPT | Performed by: CLINICAL NURSE SPECIALIST

## 2024-11-22 PROCEDURE — 1157F ADVNC CARE PLAN IN RCRD: CPT | Performed by: CLINICAL NURSE SPECIALIST

## 2024-11-22 PROCEDURE — 3074F SYST BP LT 130 MM HG: CPT | Performed by: CLINICAL NURSE SPECIALIST

## 2024-11-22 PROCEDURE — 1160F RVW MEDS BY RX/DR IN RCRD: CPT | Performed by: CLINICAL NURSE SPECIALIST

## 2024-11-22 PROCEDURE — 1036F TOBACCO NON-USER: CPT | Performed by: CLINICAL NURSE SPECIALIST

## 2024-11-22 PROCEDURE — 3078F DIAST BP <80 MM HG: CPT | Performed by: CLINICAL NURSE SPECIALIST

## 2024-11-22 RX ORDER — METOPROLOL TARTRATE 50 MG/1
50 TABLET ORAL 2 TIMES DAILY
Qty: 60 TABLET | Refills: 5 | Status: SHIPPED | OUTPATIENT
Start: 2024-11-22 | End: 2025-05-21

## 2024-11-22 ASSESSMENT — ENCOUNTER SYMPTOMS
GASTROINTESTINAL NEGATIVE: 1
CONSTITUTIONAL NEGATIVE: 1
PSYCHIATRIC NEGATIVE: 1
RESPIRATORY NEGATIVE: 1
ENDOCRINE NEGATIVE: 1
NEUROLOGICAL NEGATIVE: 1
MUSCULOSKELETAL NEGATIVE: 1

## 2024-11-22 NOTE — ASSESSMENT & PLAN NOTE
Weighs herself daily, weight is staying stable, does have some peripheral edema, wears compression stockings, no complaints of shortness of breath, would continue with current diuretic regimen if her renal function is stable

## 2024-11-22 NOTE — ASSESSMENT & PLAN NOTE
Has had some increase in her creatinine which is currently at 1.7, in the past she ran from 1.3-1.4, reviewed her diuretics with her as she stated at first she was taking it twice a day, now she states she is only taking it once a day, she is going to go home and look at her medications and confirm how often she is taking her diuretic as she is also had some hypokalemia, currently taking potassium 3 times a day, her blood pressure is well-controlled, appears that her creatinine increased over the last 4 months, will recheck labs on Monday after Thanksgiving to make sure that her creatinine is not trending up, will make further recommendations at that time

## 2024-11-22 NOTE — PROGRESS NOTES
Subjective   Patient ID: Maira Shearer is a 79 y.o. female who presents for Follow-up (5 month ck/Review labs /C/O Tingling in fingers bilaterally ).  Patient being seen in follow-up for chronic kidney disease stage IIIb with history of hyponatremia and hypokalemia    Labs reviewed  Magnesium 1.97  Glucose 143  Sodium 140, potassium 3.3, chloride 96, bicarb 33  Renal function the BUN of 17 creatinine of 1.72, GFR is 30  Calcium 8.5    She is not complaining of any shortness of breath  She does have some lower extremity edema, left greater than right, is working her compression stockings  We did spend a large amount of time going over her medications, she does use a daily pill container which helps  She is taking her potassium 3 times a day at this time  She does not watch her fluid intake very carefully, she drinks coffee tea and water        Review of Systems   Constitutional: Negative.    Respiratory: Negative.     Cardiovascular:  Positive for leg swelling.   Gastrointestinal: Negative.    Endocrine: Negative.    Genitourinary: Negative.    Musculoskeletal: Negative.    Skin: Negative.    Neurological: Negative.    Psychiatric/Behavioral: Negative.         Objective   Physical Exam  Vitals reviewed.   Constitutional:       Appearance: Normal appearance.   HENT:      Head: Normocephalic.   Cardiovascular:      Rate and Rhythm: Normal rate and regular rhythm.   Pulmonary:      Effort: Pulmonary effort is normal.      Breath sounds: Normal breath sounds.   Abdominal:      Palpations: Abdomen is soft.   Musculoskeletal:         General: Normal range of motion.      Right lower leg: Edema present.      Left lower leg: Edema present.   Skin:     General: Skin is warm and dry.   Neurological:      Mental Status: She is alert and oriented to person, place, and time.   Psychiatric:         Mood and Affect: Mood normal.         Behavior: Behavior normal.         Assessment/Plan   Problem List Items Addressed This Visit              ICD-10-CM    Primary hypertension - Primary I10     Blood pressure is currently well-controlled on amlodipine and metoprolol         Relevant Medications    metoprolol tartrate (Lopressor) 50 mg tablet    Stage 3b chronic kidney disease (Multi) N18.32     Has had some increase in her creatinine which is currently at 1.7, in the past she ran from 1.3-1.4, reviewed her diuretics with her as she stated at first she was taking it twice a day, now she states she is only taking it once a day, she is going to go home and look at her medications and confirm how often she is taking her diuretic as she is also had some hypokalemia, currently taking potassium 3 times a day, her blood pressure is well-controlled, appears that her creatinine increased over the last 4 months, will recheck labs on Monday after Thanksgiving to make sure that her creatinine is not trending up, will make further recommendations at that time         Relevant Orders    Basic metabolic panel    Follow Up In Nephrology    Type 2 diabetes mellitus, with long-term current use of insulin E11.9, Z79.4    Peripheral edema R60.0     Weighs herself daily, weight is staying stable, does have some peripheral edema, wears compression stockings, no complaints of shortness of breath, would continue with current diuretic regimen if her renal function is stable         Mixed dyslipidemia E78.2     CKD stage IIIb-with creatinine ranging from 1.1-1.3  History of SIADH/hyponatremia-sodium is stable  Hypothyroidism  Diabetes mellitus type 2-last hemoglobin A1c 6.6  Hypertension  Paroxysmal atrial fibrillation  Obesity  Chronic diarrhea  Metabolic alkalosis        Morena Tyson, PAULIE-ANISA, DNP 11/22/24 9:41 AM

## 2024-12-02 ENCOUNTER — LAB (OUTPATIENT)
Dept: LAB | Facility: LAB | Age: 79
End: 2024-12-02
Payer: COMMERCIAL

## 2024-12-02 DIAGNOSIS — N18.32 STAGE 3B CHRONIC KIDNEY DISEASE (MULTI): ICD-10-CM

## 2024-12-02 DIAGNOSIS — N18.32 TYPE 2 DIABETES MELLITUS WITH STAGE 3B CHRONIC KIDNEY DISEASE, WITH LONG-TERM CURRENT USE OF INSULIN (MULTI): ICD-10-CM

## 2024-12-02 DIAGNOSIS — E11.22 TYPE 2 DIABETES MELLITUS WITH STAGE 3B CHRONIC KIDNEY DISEASE, WITH LONG-TERM CURRENT USE OF INSULIN (MULTI): ICD-10-CM

## 2024-12-02 DIAGNOSIS — Z79.4 TYPE 2 DIABETES MELLITUS WITH STAGE 3B CHRONIC KIDNEY DISEASE, WITH LONG-TERM CURRENT USE OF INSULIN (MULTI): ICD-10-CM

## 2024-12-02 LAB
ALBUMIN SERPL BCP-MCNC: 4.2 G/DL (ref 3.4–5)
ALP SERPL-CCNC: 188 U/L (ref 33–136)
ALT SERPL W P-5'-P-CCNC: 15 U/L (ref 7–45)
ANION GAP SERPL CALC-SCNC: 12 MMOL/L (ref 10–20)
AST SERPL W P-5'-P-CCNC: 22 U/L (ref 9–39)
BILIRUB SERPL-MCNC: 1.3 MG/DL (ref 0–1.2)
BUN SERPL-MCNC: 19 MG/DL (ref 6–23)
CALCIUM SERPL-MCNC: 8.7 MG/DL (ref 8.6–10.3)
CHLORIDE SERPL-SCNC: 103 MMOL/L (ref 98–107)
CO2 SERPL-SCNC: 35 MMOL/L (ref 21–32)
CREAT SERPL-MCNC: 1.67 MG/DL (ref 0.5–1.05)
EGFRCR SERPLBLD CKD-EPI 2021: 31 ML/MIN/1.73M*2
GLUCOSE SERPL-MCNC: 54 MG/DL (ref 74–99)
POTASSIUM SERPL-SCNC: 3.6 MMOL/L (ref 3.5–5.3)
PROT SERPL-MCNC: 6.8 G/DL (ref 6.4–8.2)
SODIUM SERPL-SCNC: 146 MMOL/L (ref 136–145)

## 2024-12-02 PROCEDURE — 80053 COMPREHEN METABOLIC PANEL: CPT

## 2024-12-02 PROCEDURE — 36415 COLL VENOUS BLD VENIPUNCTURE: CPT

## 2024-12-06 ENCOUNTER — APPOINTMENT (OUTPATIENT)
Dept: CARDIAC REHAB | Facility: HOSPITAL | Age: 79
End: 2024-12-06
Payer: COMMERCIAL

## 2024-12-06 VITALS
WEIGHT: 200.1 LBS | OXYGEN SATURATION: 96 % | DIASTOLIC BLOOD PRESSURE: 82 MMHG | SYSTOLIC BLOOD PRESSURE: 134 MMHG | BODY MASS INDEX: 37.81 KG/M2 | HEART RATE: 63 BPM | RESPIRATION RATE: 18 BRPM

## 2024-12-06 DIAGNOSIS — I50.30 DIASTOLIC HEART FAILURE, UNSPECIFIED HF CHRONICITY: Primary | ICD-10-CM

## 2024-12-06 PROCEDURE — 99212 OFFICE O/P EST SF 10 MIN: CPT

## 2024-12-06 NOTE — PROGRESS NOTES
"Maira arrived ambulatory to the [facility] for his scheduled visit. Maira states she is feeling \" pretty good, I have had a dry throat lately\". She denies increased shortness of breath, PND, or Orthopnea. Eating and sleeping without distress. Home medications reviewed, with no changes. Labs were previously drawn and potassium has improved as well as kidney function. The plan for Maira is to continue current medication regimen and follow up in 2 months with device check. Reviewed signs and symptoms of increased heart failure and when to call for help. Patient verbalizes understanding for: Low sodium diet: 2000 mg daily of sodium, follow-up appointment with [facility], weighing self daily, medications dose and schedule, and signs of increased heart failure.      Vitals:  BP:  134/82          HR: 63           Resp: 18          SPO2: 96% on room air       Weight: 200.1lbs                         Previous Weight: 196.9lbs         Lung Sounds: clear    Edema: +1 bl legs with compression socks    JVD: absent     Labs: none    Medications Administered: none    Next Appointment Date: February 20 2025@ 11:30    Note in EMR for treating Physician: Dr. Baker    In-House Supervising Physician: Dr. Kameron Thibodeaux  "

## 2024-12-06 NOTE — PATIENT INSTRUCTIONS
Continue medications at same dose    Diet  2000 mg (2 gram) sodium diet-Do not add salt to foods or cook with salt. Check labels for Sodium (Na)     Resources  [facility] [phone] Monday - Friday 7:00 am - 3:00 pm  Websites: [email or web address]; American Heart Association: www.heart.org    Special Instructions:  If you smoke-Quit!  If you are not able to contact your doctor and need immediate medical attention, call 911  If you are not able to keep you're scheduled appointment at the [facility], call [phone]  Watch for and report to your doctor all warning signs of Heart Failure (increased difficulty with breathing, 3-5 pound weight gain in one week or less, increased swelling, increased tiredness)  Weigh yourself each day at the same time with the same amount of clothes on. Record your weight log. Bring it with your to each visit

## 2024-12-10 ENCOUNTER — APPOINTMENT (OUTPATIENT)
Dept: CARDIOLOGY | Facility: CLINIC | Age: 79
End: 2024-12-10
Payer: COMMERCIAL

## 2024-12-11 ENCOUNTER — HOSPITAL ENCOUNTER (EMERGENCY)
Facility: HOSPITAL | Age: 79
Discharge: OTHER INSTITUTION | End: 2024-12-12
Attending: EMERGENCY MEDICINE
Payer: COMMERCIAL

## 2024-12-11 ENCOUNTER — APPOINTMENT (OUTPATIENT)
Dept: RADIOLOGY | Facility: HOSPITAL | Age: 79
End: 2024-12-11
Payer: COMMERCIAL

## 2024-12-11 DIAGNOSIS — Z79.01 ANTICOAGULATED: ICD-10-CM

## 2024-12-11 DIAGNOSIS — K83.1 COMMON BILE DUCT (CBD) OBSTRUCTION (CMS-HCC): ICD-10-CM

## 2024-12-11 DIAGNOSIS — A49.9 UTI (URINARY TRACT INFECTION), BACTERIAL: ICD-10-CM

## 2024-12-11 DIAGNOSIS — K62.5 RECTAL BLEEDING: Primary | ICD-10-CM

## 2024-12-11 DIAGNOSIS — N93.9 VAGINAL BLEEDING: ICD-10-CM

## 2024-12-11 DIAGNOSIS — N39.0 UTI (URINARY TRACT INFECTION), BACTERIAL: ICD-10-CM

## 2024-12-11 LAB
ALBUMIN SERPL BCP-MCNC: 4.2 G/DL (ref 3.4–5)
ALP SERPL-CCNC: 222 U/L (ref 33–136)
ALT SERPL W P-5'-P-CCNC: 13 U/L (ref 7–45)
ANION GAP SERPL CALC-SCNC: 15 MMOL/L (ref 10–20)
APPEARANCE UR: ABNORMAL
APTT PPP: 41 SECONDS (ref 27–38)
AST SERPL W P-5'-P-CCNC: 24 U/L (ref 9–39)
BACTERIA #/AREA URNS AUTO: ABNORMAL /HPF
BASOPHILS # BLD AUTO: 0.07 X10*3/UL (ref 0–0.1)
BASOPHILS NFR BLD AUTO: 0.8 %
BILIRUB DIRECT SERPL-MCNC: 0.2 MG/DL (ref 0–0.3)
BILIRUB SERPL-MCNC: 1 MG/DL (ref 0–1.2)
BILIRUB UR STRIP.AUTO-MCNC: NEGATIVE MG/DL
BUN SERPL-MCNC: 24 MG/DL (ref 6–23)
CALCIUM SERPL-MCNC: 9.1 MG/DL (ref 8.6–10.3)
CHLORIDE SERPL-SCNC: 101 MMOL/L (ref 98–107)
CO2 SERPL-SCNC: 26 MMOL/L (ref 21–32)
COLOR UR: YELLOW
CREAT SERPL-MCNC: 1.86 MG/DL (ref 0.5–1.05)
EGFRCR SERPLBLD CKD-EPI 2021: 27 ML/MIN/1.73M*2
EOSINOPHIL # BLD AUTO: 0.33 X10*3/UL (ref 0–0.4)
EOSINOPHIL NFR BLD AUTO: 3.6 %
ERYTHROCYTE [DISTWIDTH] IN BLOOD BY AUTOMATED COUNT: 14 % (ref 11.5–14.5)
GLUCOSE SERPL-MCNC: 164 MG/DL (ref 74–99)
GLUCOSE UR STRIP.AUTO-MCNC: ABNORMAL MG/DL
HCT VFR BLD AUTO: 39.2 % (ref 36–46)
HGB BLD-MCNC: 12.8 G/DL (ref 12–16)
IMM GRANULOCYTES # BLD AUTO: 0.04 X10*3/UL (ref 0–0.5)
IMM GRANULOCYTES NFR BLD AUTO: 0.4 % (ref 0–0.9)
INR PPP: 1.5 (ref 0.9–1.1)
KETONES UR STRIP.AUTO-MCNC: NEGATIVE MG/DL
LEUKOCYTE ESTERASE UR QL STRIP.AUTO: ABNORMAL
LIPASE SERPL-CCNC: 12 U/L (ref 9–82)
LYMPHOCYTES # BLD AUTO: 1.11 X10*3/UL (ref 0.8–3)
LYMPHOCYTES NFR BLD AUTO: 12 %
MCH RBC QN AUTO: 33.1 PG (ref 26–34)
MCHC RBC AUTO-ENTMCNC: 32.7 G/DL (ref 32–36)
MCV RBC AUTO: 101 FL (ref 80–100)
MONOCYTES # BLD AUTO: 1.05 X10*3/UL (ref 0.05–0.8)
MONOCYTES NFR BLD AUTO: 11.3 %
NEUTROPHILS # BLD AUTO: 6.68 X10*3/UL (ref 1.6–5.5)
NEUTROPHILS NFR BLD AUTO: 71.9 %
NITRITE UR QL STRIP.AUTO: NEGATIVE
NRBC BLD-RTO: 0 /100 WBCS (ref 0–0)
PH UR STRIP.AUTO: 7 [PH]
PLATELET # BLD AUTO: 177 X10*3/UL (ref 150–450)
POTASSIUM SERPL-SCNC: 5.3 MMOL/L (ref 3.5–5.3)
PROT SERPL-MCNC: 7.6 G/DL (ref 6.4–8.2)
PROT UR STRIP.AUTO-MCNC: ABNORMAL MG/DL
PROTHROMBIN TIME: 17 SECONDS (ref 9.8–12.8)
RBC # BLD AUTO: 3.87 X10*6/UL (ref 4–5.2)
RBC # UR STRIP.AUTO: ABNORMAL /UL
RBC #/AREA URNS AUTO: ABNORMAL /HPF
SODIUM SERPL-SCNC: 137 MMOL/L (ref 136–145)
SP GR UR STRIP.AUTO: 1.03
SQUAMOUS #/AREA URNS AUTO: ABNORMAL /HPF
UROBILINOGEN UR STRIP.AUTO-MCNC: NORMAL MG/DL
WBC # BLD AUTO: 9.3 X10*3/UL (ref 4.4–11.3)
WBC #/AREA URNS AUTO: >50 /HPF
WBC CLUMPS #/AREA URNS AUTO: ABNORMAL /HPF

## 2024-12-11 PROCEDURE — 83690 ASSAY OF LIPASE: CPT | Performed by: PHYSICIAN ASSISTANT

## 2024-12-11 PROCEDURE — 85025 COMPLETE CBC W/AUTO DIFF WBC: CPT | Performed by: PHYSICIAN ASSISTANT

## 2024-12-11 PROCEDURE — 85730 THROMBOPLASTIN TIME PARTIAL: CPT | Performed by: PHYSICIAN ASSISTANT

## 2024-12-11 PROCEDURE — 84075 ASSAY ALKALINE PHOSPHATASE: CPT | Performed by: PHYSICIAN ASSISTANT

## 2024-12-11 PROCEDURE — 2550000001 HC RX 255 CONTRASTS: Performed by: PHYSICIAN ASSISTANT

## 2024-12-11 PROCEDURE — 81001 URINALYSIS AUTO W/SCOPE: CPT | Performed by: PHYSICIAN ASSISTANT

## 2024-12-11 PROCEDURE — 74177 CT ABD & PELVIS W/CONTRAST: CPT | Performed by: RADIOLOGY

## 2024-12-11 PROCEDURE — 99285 EMERGENCY DEPT VISIT HI MDM: CPT | Mod: 25 | Performed by: EMERGENCY MEDICINE

## 2024-12-11 PROCEDURE — 74177 CT ABD & PELVIS W/CONTRAST: CPT

## 2024-12-11 PROCEDURE — 85610 PROTHROMBIN TIME: CPT | Performed by: PHYSICIAN ASSISTANT

## 2024-12-11 PROCEDURE — 2500000004 HC RX 250 GENERAL PHARMACY W/ HCPCS (ALT 636 FOR OP/ED): Performed by: PHYSICIAN ASSISTANT

## 2024-12-11 PROCEDURE — 82374 ASSAY BLOOD CARBON DIOXIDE: CPT | Performed by: PHYSICIAN ASSISTANT

## 2024-12-11 PROCEDURE — 87086 URINE CULTURE/COLONY COUNT: CPT | Mod: SAMLAB | Performed by: PHYSICIAN ASSISTANT

## 2024-12-11 PROCEDURE — 36415 COLL VENOUS BLD VENIPUNCTURE: CPT | Performed by: PHYSICIAN ASSISTANT

## 2024-12-11 RX ORDER — LISINOPRIL 40 MG/1
40 TABLET ORAL DAILY
COMMUNITY

## 2024-12-11 RX ADMIN — IOHEXOL 67 ML: 350 INJECTION, SOLUTION INTRAVENOUS at 20:05

## 2024-12-11 RX ADMIN — SODIUM CHLORIDE 1000 ML: 9 INJECTION, SOLUTION INTRAVENOUS at 20:01

## 2024-12-11 ASSESSMENT — COLUMBIA-SUICIDE SEVERITY RATING SCALE - C-SSRS
6. HAVE YOU EVER DONE ANYTHING, STARTED TO DO ANYTHING, OR PREPARED TO DO ANYTHING TO END YOUR LIFE?: NO
2. HAVE YOU ACTUALLY HAD ANY THOUGHTS OF KILLING YOURSELF?: NO
1. IN THE PAST MONTH, HAVE YOU WISHED YOU WERE DEAD OR WISHED YOU COULD GO TO SLEEP AND NOT WAKE UP?: NO

## 2024-12-11 ASSESSMENT — PAIN SCALES - GENERAL
PAINLEVEL_OUTOF10: 0 - NO PAIN
PAINLEVEL_OUTOF10: 0 - NO PAIN

## 2024-12-11 ASSESSMENT — PAIN - FUNCTIONAL ASSESSMENT: PAIN_FUNCTIONAL_ASSESSMENT: 0-10

## 2024-12-11 NOTE — ED PROVIDER NOTES
Patient is an 79-year-old female who presents to the emergency room with a chief complaint of bright red blood per rectum that started around 3:00 this afternoon.  Patient states that she was having a bowel movement which she reports was loose and shortly thereafter began bleeding from her rectum and symptoms have persisted.  She denies any rectal pain.  She denies any fever or chills.  Patient does have a history of hemorrhoids.  She denies any trauma         Review of Systems   Constitutional:  Negative for chills and fever.   HENT:  Negative for ear pain and sore throat.    Eyes:  Negative for pain and visual disturbance.   Respiratory:  Negative for cough and shortness of breath.    Cardiovascular:  Negative for chest pain and palpitations.   Gastrointestinal:  Negative for abdominal pain and vomiting.        Rectal bleeding   Genitourinary:  Negative for dysuria and hematuria.   Musculoskeletal:  Negative for arthralgias and back pain.   Skin:  Negative for color change and rash.   Neurological:  Negative for seizures and syncope.   All other systems reviewed and are negative.       Physical Exam  Vitals and nursing note reviewed.   Constitutional:       General: She is not in acute distress.     Appearance: She is well-developed.   HENT:      Head: Normocephalic and atraumatic.      Mouth/Throat:      Mouth: Mucous membranes are moist.      Pharynx: No oropharyngeal exudate or posterior oropharyngeal erythema.   Eyes:      Extraocular Movements: Extraocular movements intact.      Conjunctiva/sclera: Conjunctivae normal.   Cardiovascular:      Rate and Rhythm: Normal rate and regular rhythm.      Heart sounds: No murmur heard.  Pulmonary:      Effort: Pulmonary effort is normal. No respiratory distress.      Breath sounds: Normal breath sounds. No stridor. No wheezing, rhonchi or rales.   Abdominal:      General: There is no distension.      Palpations: Abdomen is soft. There is no mass.      Tenderness: There  is no abdominal tenderness. There is no guarding or rebound.      Hernia: No hernia is present.   Genitourinary:     Comments: Multiple hemorrhoids noted. Oozing from the rectum. Scant blood noted in the vaginal vault with what appears to be consistent with 2 small abrasions  Musculoskeletal:         General: No swelling.      Cervical back: Normal range of motion and neck supple. No rigidity.   Skin:     General: Skin is warm and dry.      Capillary Refill: Capillary refill takes less than 2 seconds.   Neurological:      General: No focal deficit present.      Mental Status: She is alert.   Psychiatric:         Mood and Affect: Mood normal.        Labs Reviewed   CBC WITH AUTO DIFFERENTIAL - Abnormal       Result Value    WBC 9.3      nRBC 0.0      RBC 3.87 (*)     Hemoglobin 12.8      Hematocrit 39.2       (*)     MCH 33.1      MCHC 32.7      RDW 14.0      Platelets 177      Neutrophils % 71.9      Immature Granulocytes %, Automated 0.4      Lymphocytes % 12.0      Monocytes % 11.3      Eosinophils % 3.6      Basophils % 0.8      Neutrophils Absolute 6.68 (*)     Immature Granulocytes Absolute, Automated 0.04      Lymphocytes Absolute 1.11      Monocytes Absolute 1.05 (*)     Eosinophils Absolute 0.33      Basophils Absolute 0.07     BASIC METABOLIC PANEL - Abnormal    Glucose 164 (*)     Sodium 137      Potassium 5.3      Chloride 101      Bicarbonate 26      Anion Gap 15      Urea Nitrogen 24 (*)     Creatinine 1.86 (*)     eGFR 27 (*)     Calcium 9.1     HEPATIC FUNCTION PANEL - Abnormal    Albumin 4.2      Bilirubin, Total 1.0      Bilirubin, Direct 0.2      Alkaline Phosphatase 222 (*)     ALT 13      AST 24      Total Protein 7.6     PROTIME-INR - Abnormal    Protime 17.0 (*)     INR 1.5 (*)    APTT - Abnormal    aPTT 41 (*)     Narrative:     The APTT is no longer used for monitoring Unfractionated Heparin Therapy. For monitoring Heparin Therapy, use the Heparin Assay.   URINALYSIS WITH REFLEX  CULTURE AND MICROSCOPIC - Abnormal    Color, Urine Yellow      Appearance, Urine Turbid (*)     Specific Gravity, Urine 1.031      pH, Urine 7.0      Protein, Urine 50 (1+) (*)     Glucose, Urine OVER (4+) (*)     Blood, Urine OVER (3+) (*)     Ketones, Urine NEGATIVE      Bilirubin, Urine NEGATIVE      Urobilinogen, Urine Normal      Nitrite, Urine NEGATIVE      Leukocyte Esterase, Urine 500 Marie/µL (*)     Narrative:     OVER is reported when the result is greater than the clinically reportable range.   MICROSCOPIC ONLY, URINE - Abnormal    WBC, Urine >50 (*)     WBC Clumps, Urine FEW      RBC, Urine 11-20 (*)     Squamous Epithelial Cells, Urine 1-9 (SPARSE)      Bacteria, Urine 2+ (*)    LIPASE - Normal    Lipase 12      Narrative:     Venipuncture immediately after or during the administration of Metamizole may lead to falsely low results. Testing should be performed immediately prior to Metamizole dosing.   URINE CULTURE   URINALYSIS WITH REFLEX CULTURE AND MICROSCOPIC    Narrative:     The following orders were created for panel order Urinalysis with Reflex Culture and Microscopic.  Procedure                               Abnormality         Status                     ---------                               -----------         ------                     Urinalysis with Reflex C...[[id number]]  Abnormal            Final result               Extra Urine Gray Tube[[id number]]                            In process                   Please view results for these tests on the individual orders.   EXTRA URINE GRAY TUBE        CT abdomen pelvis w IV contrast   Final Result   1. Moderate right and trace left pleural effusions.   2. There appears to be mild prominence and enhancement of the walls   of the common bile duct, correlate for evidence of cholangitis.   3. Mild fat stranding through the region of the descending duodenum   pancreatic head. Correlate for possible pancreatitis or duodenitis,   including peptic  ulcer disease.   4. Mild stranding tracking along the mesenteric root and bilateral   iliac regions possibly related to above.        MACRO:   None        Signed by: Nasim Silva 12/11/2024 8:41 PM   Dictation workstation:   [id number]           Procedures     Medical Decision Making  Patient is an 79-year-old female who presents to the emergency room for chief complaint of bright red blood per rectum.  Patient is anticoagulated on Eliquis for history of atrial fibrillation.  She reports that around 3:00 this afternoon after having a bowel movement which she describes as diarrhea and she developed bright red blood per rectum that has been persistent.  She presented after the bleeding continued.  She denied any chest pain, shortness of breath, abdominal pain, or any additional symptoms.  She denied any trauma.  On physical examination there does appear to be blood oozing from the rectum.  I do not appreciate any rectal tears.  There are multiple hemorrhoids noted but I do not appreciate any obvious bleeding from these hemorrhoids.  Pelvic exam was also performed as it was questionable initially as to where the bleeding was coming from.  There was some bleeding noted in the vaginal vault with small abrasions and a small amount of bleeding with no appreciated blood clots.  After pelvic exam was performed once again patient's rectum was rechecked and there was pooling of blood noted.  Concern for both rectal and vaginal bleeding in which patient will need GI and also gynecology.  In addition a CT scan of her abdomen pelvis was performed to rule out a fistula or any other acute abnormalities.  Patient CT scan shows findings that show a prominent common bile duct and also fat stranding of the duodenum and pancreatic head concerning for pancreatitis or duodenitis.  Once again patient denies any abdominal pain whatsoever.  Her abdomen is soft and nontender.  No elevation of her LFTs.  No leukocytosis.  I do not feel that  these findings correlate to her symptomatology and are incidental.  I do feel that patient needs transfer to facility with GI and gynecology.  We do not have GI available at this time at our facility.  Discussed transfer to [place] in which patient and family declined, requested transfer to [place].  I spoke with Marcelo at the [facility] transfer center who spoke with the surgical on-call PA who states that patient will need a facility with the capability to perform an ERCP if needed and also with interventional radiology.  Patient is agreeable to transfer at [place] or [place].  Awaiting callback from GYN for acceptance.  Patient care transitioned to attending physician, Dr. Garcia at 2200.    Patient was accepted for admission at [place].  Currently we are awaiting a room at [place] for the patient be transferred to.  The EMTALA form has been filled out.  Patient care was turned over to the oncoming ED attending at 7 AM.    Amount and/or Complexity of Data Reviewed  Labs: ordered. Decision-making details documented in ED Course.  Radiology: ordered. Decision-making details documented in ED Course.    Risk  Decision regarding hospitalization.         Diagnoses as of 12/13/24 0245   Rectal bleeding   Vaginal bleeding   Anticoagulated   Common bile duct (CBD) obstruction (CMS-HCC)   UTI (urinary tract infection), bacterial                    Amber Chadwick PA-C  12/11/24 2153       Amber Chadwick PA-C  12/11/24 2202       Elio Garcia DO  12/13/24 0246

## 2024-12-12 ENCOUNTER — APPOINTMENT (OUTPATIENT)
Dept: PRIMARY CARE | Facility: CLINIC | Age: 79
End: 2024-12-12
Payer: COMMERCIAL

## 2024-12-12 VITALS
HEIGHT: 61 IN | BODY MASS INDEX: 36.63 KG/M2 | OXYGEN SATURATION: 93 % | DIASTOLIC BLOOD PRESSURE: 63 MMHG | WEIGHT: 194 LBS | SYSTOLIC BLOOD PRESSURE: 127 MMHG | HEART RATE: 60 BPM | RESPIRATION RATE: 16 BRPM | TEMPERATURE: 98.1 F

## 2024-12-12 LAB
BASOPHILS # BLD AUTO: 0.07 X10*3/UL (ref 0–0.1)
BASOPHILS NFR BLD AUTO: 1 %
EOSINOPHIL # BLD AUTO: 0.24 X10*3/UL (ref 0–0.4)
EOSINOPHIL NFR BLD AUTO: 3.6 %
ERYTHROCYTE [DISTWIDTH] IN BLOOD BY AUTOMATED COUNT: 14 % (ref 11.5–14.5)
GLUCOSE BLD MANUAL STRIP-MCNC: 241 MG/DL (ref 74–99)
HCT VFR BLD AUTO: 38.9 % (ref 36–46)
HGB BLD-MCNC: 12.3 G/DL (ref 12–16)
HOLD SPECIMEN: NORMAL
IMM GRANULOCYTES # BLD AUTO: 0.03 X10*3/UL (ref 0–0.5)
IMM GRANULOCYTES NFR BLD AUTO: 0.4 % (ref 0–0.9)
LYMPHOCYTES # BLD AUTO: 0.84 X10*3/UL (ref 0.8–3)
LYMPHOCYTES NFR BLD AUTO: 12.5 %
MCH RBC QN AUTO: 32.7 PG (ref 26–34)
MCHC RBC AUTO-ENTMCNC: 31.6 G/DL (ref 32–36)
MCV RBC AUTO: 104 FL (ref 80–100)
MONOCYTES # BLD AUTO: 0.63 X10*3/UL (ref 0.05–0.8)
MONOCYTES NFR BLD AUTO: 9.4 %
NEUTROPHILS # BLD AUTO: 4.89 X10*3/UL (ref 1.6–5.5)
NEUTROPHILS NFR BLD AUTO: 73.1 %
NRBC BLD-RTO: 0 /100 WBCS (ref 0–0)
PLATELET # BLD AUTO: 165 X10*3/UL (ref 150–450)
RBC # BLD AUTO: 3.76 X10*6/UL (ref 4–5.2)
WBC # BLD AUTO: 6.7 X10*3/UL (ref 4.4–11.3)

## 2024-12-12 PROCEDURE — 36415 COLL VENOUS BLD VENIPUNCTURE: CPT | Performed by: EMERGENCY MEDICINE

## 2024-12-12 PROCEDURE — 85025 COMPLETE CBC W/AUTO DIFF WBC: CPT | Performed by: EMERGENCY MEDICINE

## 2024-12-12 PROCEDURE — 2500000004 HC RX 250 GENERAL PHARMACY W/ HCPCS (ALT 636 FOR OP/ED): Performed by: EMERGENCY MEDICINE

## 2024-12-12 PROCEDURE — 2500000001 HC RX 250 WO HCPCS SELF ADMINISTERED DRUGS (ALT 637 FOR MEDICARE OP): Performed by: EMERGENCY MEDICINE

## 2024-12-12 PROCEDURE — 82947 ASSAY GLUCOSE BLOOD QUANT: CPT

## 2024-12-12 PROCEDURE — 2500000002 HC RX 250 W HCPCS SELF ADMINISTERED DRUGS (ALT 637 FOR MEDICARE OP, ALT 636 FOR OP/ED): Performed by: EMERGENCY MEDICINE

## 2024-12-12 RX ORDER — LEVOTHYROXINE SODIUM 25 UG/1
12.5 TABLET ORAL DAILY
Status: DISCONTINUED | OUTPATIENT
Start: 2024-12-12 | End: 2024-12-12 | Stop reason: DRUGHIGH

## 2024-12-12 RX ORDER — LEVOTHYROXINE SODIUM 125 UG/1
125 TABLET ORAL DAILY
Status: DISCONTINUED | OUTPATIENT
Start: 2024-12-12 | End: 2024-12-12 | Stop reason: HOSPADM

## 2024-12-12 RX ORDER — ACETAMINOPHEN 325 MG/1
650 TABLET ORAL ONCE
Status: COMPLETED | OUTPATIENT
Start: 2024-12-12 | End: 2024-12-12

## 2024-12-12 RX ORDER — CEFTRIAXONE 1 G/50ML
1 INJECTION, SOLUTION INTRAVENOUS ONCE
Status: COMPLETED | OUTPATIENT
Start: 2024-12-12 | End: 2024-12-12

## 2024-12-12 RX ORDER — AMIODARONE HYDROCHLORIDE 200 MG/1
200 TABLET ORAL DAILY
Status: DISCONTINUED | OUTPATIENT
Start: 2024-12-12 | End: 2024-12-12 | Stop reason: HOSPADM

## 2024-12-12 RX ADMIN — LEVOTHYROXINE SODIUM 125 MCG: 0.12 TABLET ORAL at 10:02

## 2024-12-12 RX ADMIN — AMIODARONE HYDROCHLORIDE 200 MG: 200 TABLET ORAL at 10:02

## 2024-12-12 RX ADMIN — CEFTRIAXONE SODIUM 1 G: 1 INJECTION, SOLUTION INTRAVENOUS at 08:26

## 2024-12-12 RX ADMIN — ACETAMINOPHEN 650 MG: 325 TABLET ORAL at 04:12

## 2024-12-12 ASSESSMENT — PAIN - FUNCTIONAL ASSESSMENT
PAIN_FUNCTIONAL_ASSESSMENT: 0-10
PAIN_FUNCTIONAL_ASSESSMENT: 0-10

## 2024-12-12 ASSESSMENT — PAIN SCALES - GENERAL
PAINLEVEL_OUTOF10: 0 - NO PAIN

## 2024-12-12 NOTE — PROGRESS NOTES
Emergency Medicine Transition of Care Note.    I received Maira Shearer in signout from Dr. HARMON.  Please see the previous ED provider note for all HPI, PE and MDM up to the time of signout at 7AM. This is in addition to the primary record.    In brief Maira Shearer is an 79 y.o. female presenting for   Chief Complaint   Patient presents with    Rectal Bleeding     Pt to ED with red rectal bleeding starting at 3pm. Pt has history of bleeding hemorrhoids, but this was more blood than usual, it wont stop. Pt on Eliquis. Denies black/tarry stools     At the time of signout we were awaiting: TRANSFER    Diagnoses as of 12/12/24 0822   Rectal bleeding   Vaginal bleeding   Anticoagulated   Common bile duct (CBD) obstruction (CMS-[facility])   UTI (urinary tract infection), bacterial       Medical Decision Making  Patient signed out to me awaiting transfer to [facility] in [place].  Patient reexamined with no additional findings she had a bowel movement this morning with no blood at this time.  She did have a urinary tract infection was given Rocephin 1 g intravenously was placed on a clear liquid diet [facility] was contacted to see the condition of her bed availability at this time.        Final diagnoses:   [K62.5] Rectal bleeding   [N93.9] Vaginal bleeding   [Z79.01] Anticoagulated   [K83.1] Common bile duct (CBD) obstruction (CMS-[facility])   [N39.0, A49.9] UTI (urinary tract infection), bacterial           Procedure  Procedures    Marcelo Pack MD

## 2024-12-13 LAB — BACTERIA UR CULT: NO GROWTH

## 2024-12-13 ASSESSMENT — ENCOUNTER SYMPTOMS
CHILLS: 0
SORE THROAT: 0
ROS GI COMMENTS: RECTAL BLEEDING
PALPITATIONS: 0
EYE PAIN: 0
VOMITING: 0
SHORTNESS OF BREATH: 0
HEMATURIA: 0
DYSURIA: 0
ARTHRALGIAS: 0
BACK PAIN: 0
SEIZURES: 0
FEVER: 0
ABDOMINAL PAIN: 0
COLOR CHANGE: 0
COUGH: 0

## 2024-12-19 ENCOUNTER — PATIENT OUTREACH (OUTPATIENT)
Dept: PRIMARY CARE | Facility: CLINIC | Age: 79
End: 2024-12-19
Payer: COMMERCIAL

## 2024-12-19 RX ORDER — PANTOPRAZOLE SODIUM 40 MG/1
40 TABLET, DELAYED RELEASE ORAL
COMMUNITY

## 2024-12-19 RX ORDER — HYDROCORTISONE ACETATE 25 MG/1
25 SUPPOSITORY RECTAL 2 TIMES DAILY
COMMUNITY

## 2024-12-19 NOTE — PROGRESS NOTES
Discharge Facility: [facility]  Discharge Diagnosis: Gastrointestinal hemorrhage   Admission Date: 12/12/2024  Discharge Date: 12/18/2024    PCP Appointment Date:  -Pt declines scheduling at this time and states her daughter in law will be calling to schedule    Hospital Encounter and Summary Linked: Yes    See discharge assessment below for further details    Engagement  Call Start Time: 1011 (12/19/2024 10:17 AM)    Medications  Medications reviewed with patient/caregiver?: Yes (new prescriptions reviewed; polycarbonphil, pantoprazole, hydrocortisone) (12/19/2024 10:17 AM)  Does the patient have all medications ordered at discharge?: Yes (pt states her daughter in law will be picking up from pharmacy today) (12/19/2024 10:17 AM)  Care Management Interventions: No intervention needed (12/19/2024 10:17 AM)  Is the patient taking all medications as directed (includes completed medication regime)?: Yes (12/19/2024 10:17 AM)    Appointments  Does the patient have a primary care provider?: Yes (12/19/2024 10:17 AM)  Care Management Interventions: Verified appointment date/time/provider (12/19/2024 10:17 AM)  Has the patient kept scheduled appointments due by today?: Yes (12/19/2024 10:17 AM)    Self Management  Has home health visited the patient within 72 hours of discharge?: Not applicable (12/19/2024 10:17 AM)    Patient Teaching  Does the patient have access to their discharge instructions?: Yes (12/19/2024 10:17 AM)  Care Management Interventions: Reviewed instructions with patient (12/19/2024 10:17 AM)  What is the patient's perception of their health status since discharge?: Improving (12/19/2024 10:17 AM)  Is the patient/caregiver able to teach back the hierarchy of who to call/visit for symptoms/problems? PCP, Specialist, Home Health nurse, Urgent Care, ED, 911: Yes (12/19/2024 10:17 AM)    Wrap Up  Wrap Up Additional Comments: Pt was admitted to [facility]  12/12-12/18/2024 for gastrointestinal hemorrhage. Pt reports feeling better since discharge. Pt reports she had two normal bowel movements this morning. Discussed new prescriptions; polycarbonphil, protonix, hydrocortisone suppository; pt denies questions or issues with medication and states her daughter in law will be picking up from pharmacy today. Pt reports understanding of discharge instructions. Pt states her daughter in law is going to be calling to schedule PCP follow up appt. Pt reports she is on 2L home oxygen now and her equipment is supposed to be dropped off in around fifteen minutes. Pt denies any questions, needs, or concerns at this time. She is encouraged to call if questions or needs arise. (12/19/2024 10:17 AM)  Call End Time: 1019 (12/19/2024 10:17 AM)

## 2024-12-23 ENCOUNTER — TELEPHONE (OUTPATIENT)
Dept: NEPHROLOGY | Facility: CLINIC | Age: 79
End: 2024-12-23
Payer: COMMERCIAL

## 2025-01-03 ENCOUNTER — APPOINTMENT (OUTPATIENT)
Dept: PRIMARY CARE | Facility: CLINIC | Age: 80
End: 2025-01-03
Payer: COMMERCIAL

## 2025-01-03 VITALS
WEIGHT: 187 LBS | OXYGEN SATURATION: 99 % | HEART RATE: 60 BPM | HEIGHT: 61 IN | BODY MASS INDEX: 35.3 KG/M2 | DIASTOLIC BLOOD PRESSURE: 108 MMHG | SYSTOLIC BLOOD PRESSURE: 158 MMHG

## 2025-01-03 DIAGNOSIS — E11.9 TYPE 2 DIABETES MELLITUS WITHOUT COMPLICATION, WITHOUT LONG-TERM CURRENT USE OF INSULIN (MULTI): ICD-10-CM

## 2025-01-03 DIAGNOSIS — K57.91 GASTROINTESTINAL HEMORRHAGE ASSOCIATED WITH INTESTINAL DIVERTICULOSIS: Primary | ICD-10-CM

## 2025-01-03 PROCEDURE — 3077F SYST BP >= 140 MM HG: CPT

## 2025-01-03 PROCEDURE — 3080F DIAST BP >= 90 MM HG: CPT

## 2025-01-03 PROCEDURE — 1160F RVW MEDS BY RX/DR IN RCRD: CPT

## 2025-01-03 PROCEDURE — 1159F MED LIST DOCD IN RCRD: CPT

## 2025-01-03 PROCEDURE — 99214 OFFICE O/P EST MOD 30 MIN: CPT

## 2025-01-03 PROCEDURE — 1036F TOBACCO NON-USER: CPT

## 2025-01-03 PROCEDURE — 1157F ADVNC CARE PLAN IN RCRD: CPT

## 2025-01-03 RX ORDER — FLASH GLUCOSE SENSOR
KIT MISCELLANEOUS
Qty: 1 EACH | Refills: 3 | Status: SHIPPED | OUTPATIENT
Start: 2025-01-03

## 2025-01-03 ASSESSMENT — ENCOUNTER SYMPTOMS
WOUND: 0
NERVOUS/ANXIOUS: 0
FATIGUE: 0
POLYDIPSIA: 0
COUGH: 0
ARTHRALGIAS: 0
MYALGIAS: 0
WHEEZING: 0
DIARRHEA: 0
DIFFICULTY URINATING: 0
PALPITATIONS: 0
CONSTIPATION: 0
WEAKNESS: 0
CHILLS: 0
FREQUENCY: 0
SHORTNESS OF BREATH: 1
UNEXPECTED WEIGHT CHANGE: 0
ABDOMINAL DISTENTION: 0
TROUBLE SWALLOWING: 0
RECTAL PAIN: 0
NUMBNESS: 0
NAUSEA: 0
ABDOMINAL PAIN: 0
HEADACHES: 0
CHEST TIGHTNESS: 0
DIAPHORESIS: 0
BLOOD IN STOOL: 0
POLYPHAGIA: 0

## 2025-01-03 NOTE — PROGRESS NOTES
Subjective   Patient ID: Maira Shearer is a 79 y.o. female who presents for Hopsital follow up (PT is here today for a hospital follow up visit from [facility] (142/12/24) for a GI bleed. ).    Patient presents today following hospitalization for gastrointestinal bleed.    GI bleed: Patient presented to [facility] emergency department acute gastrointestinal bleed.  Was transferred to [facility] due to lack of resources and beds in the area.  She was sent to [facility] where she underwent a colonoscopy which found internal/external hemorrhoids and polyps in her transverse colon, additional finding of diverticulosis.  Patient had a stable hematocrit and hemoglobin while in the hospital and required no transfusion.  Biopsy of polyps was taken and patient was later cleared by gastroenterology.  They did clear her to resume her aspirin and Plavix treatment.  She was started on PPI therapy.  She has no other reported gastrointestinal bleeding at this time.     Cardiovascular: Followed by Dr. Conde, is scheduled to see Dr. Baker in 7 days.  During her admission they stopped her amlodipine and started her on amiodarone.  Her blood pressure in office is 138/78.  She has no cardiovascular complaints at this time.  She does have a blood pressure log at home with pressures ranging from 120-130 systolic.  Her heart rate fluctuates between 60 and 90 on readings.  She does wear her oxygen all day.     Preventative health: Influenza, Pneumovax and shingles up-to-date.  No COVID-vaccine.               Review of Systems   Constitutional:  Negative for chills, diaphoresis, fatigue and unexpected weight change.   HENT:  Negative for dental problem, tinnitus and trouble swallowing.    Eyes:  Negative for visual disturbance.   Respiratory:  Positive for shortness of breath (chronic). Negative for cough, chest tightness and wheezing.    Cardiovascular:  Negative for chest pain, palpitations and leg swelling.  "  Gastrointestinal:  Negative for abdominal distention, abdominal pain, blood in stool, constipation, diarrhea, nausea and rectal pain.   Endocrine: Negative for polydipsia, polyphagia and polyuria.   Genitourinary:  Negative for difficulty urinating, frequency and urgency.   Musculoskeletal:  Negative for arthralgias and myalgias.   Skin:  Negative for pallor and wound.   Neurological:  Negative for syncope, weakness, numbness and headaches.   Psychiatric/Behavioral:  Negative for suicidal ideas. The patient is not nervous/anxious.        Objective   BP (!) 158/108   Pulse 60   Ht 1.549 m (5' 1\")   Wt 84.8 kg (187 lb)   SpO2 99%   BMI 35.33 kg/m²     Physical Exam  Vitals and nursing note reviewed.   Constitutional:       General: She is not in acute distress.     Appearance: Normal appearance. She is obese.   HENT:      Head: Normocephalic.      Nose: Nose normal.      Mouth/Throat:      Mouth: Mucous membranes are moist.      Pharynx: Oropharynx is clear.   Eyes:      General: No scleral icterus.     Pupils: Pupils are equal, round, and reactive to light.   Cardiovascular:      Rate and Rhythm: Normal rate and regular rhythm.      Pulses: Normal pulses.      Heart sounds: Normal heart sounds.   Pulmonary:      Effort: Pulmonary effort is normal. No respiratory distress.      Breath sounds: Normal breath sounds. No stridor. No wheezing, rhonchi or rales.   Chest:      Chest wall: No tenderness.   Abdominal:      General: Bowel sounds are normal. There is no distension.      Palpations: Abdomen is soft. There is no mass.      Tenderness: There is no abdominal tenderness. There is no guarding or rebound.      Hernia: No hernia is present.   Musculoskeletal:         General: Normal range of motion.      Cervical back: Normal range of motion.   Skin:     General: Skin is warm and dry.      Capillary Refill: Capillary refill takes less than 2 seconds.   Neurological:      General: No focal deficit present.      " Mental Status: She is alert and oriented to person, place, and time. Mental status is at baseline.   Psychiatric:         Mood and Affect: Mood normal.         Behavior: Behavior normal.         Thought Content: Thought content normal.         Judgment: Judgment normal.         Assessment/Plan   Diagnoses and all orders for this visit:  Gastrointestinal hemorrhage associated with intestinal diverticulosis  Type 2 diabetes mellitus without complication, without long-term current use of insulin (Multi)  -     flash glucose sensor kit (FreeStyle Alannah 2 Sensor) kit; Use as instructed  -     Basic Metabolic Panel; Future  -     CBC; Future  -     Hemoglobin A1C; Future

## 2025-01-06 ENCOUNTER — PATIENT OUTREACH (OUTPATIENT)
Dept: PRIMARY CARE | Facility: CLINIC | Age: 80
End: 2025-01-06
Payer: COMMERCIAL

## 2025-01-06 NOTE — PROGRESS NOTES
Call regarding appt. with PCP on 1/3/2025 after hospitalization and PCP appt.  At time of outreach call the patient feels as if their condition has improved since last visit.  Reviewed the PCP appointment with the pt and addressed any questions or concerns.    Verified upcoming appts;   -1/10/2025 1030 cardiology   -2/7/2025 1500 PCP; pt aware of lab work to be drawn    Pt denies any questions, needs, or concerns. Pt reports she is doing fine with her medication. Pt encouraged to call if questions or needs arise.

## 2025-01-10 ENCOUNTER — APPOINTMENT (OUTPATIENT)
Dept: CARDIOLOGY | Facility: CLINIC | Age: 80
End: 2025-01-10
Payer: COMMERCIAL

## 2025-01-10 VITALS
OXYGEN SATURATION: 97 % | BODY MASS INDEX: 33.99 KG/M2 | HEIGHT: 61 IN | WEIGHT: 180 LBS | DIASTOLIC BLOOD PRESSURE: 80 MMHG | SYSTOLIC BLOOD PRESSURE: 128 MMHG | HEART RATE: 65 BPM

## 2025-01-10 DIAGNOSIS — R06.02 SHORTNESS OF BREATH: ICD-10-CM

## 2025-01-10 DIAGNOSIS — Z95.0 PACEMAKER: Primary | ICD-10-CM

## 2025-01-10 DIAGNOSIS — E78.1 HYPERTRIGLYCERIDEMIA: ICD-10-CM

## 2025-01-10 DIAGNOSIS — I10 PRIMARY HYPERTENSION: ICD-10-CM

## 2025-01-10 DIAGNOSIS — I50.9 DYSPNEA DUE TO CONGESTIVE HEART FAILURE: ICD-10-CM

## 2025-01-10 DIAGNOSIS — I44.2 ATRIOVENTRICULAR BLOCK, COMPLETE (MULTI): ICD-10-CM

## 2025-01-10 DIAGNOSIS — I48.0 PAROXYSMAL ATRIAL FIBRILLATION (MULTI): ICD-10-CM

## 2025-01-10 PROCEDURE — 99214 OFFICE O/P EST MOD 30 MIN: CPT | Performed by: STUDENT IN AN ORGANIZED HEALTH CARE EDUCATION/TRAINING PROGRAM

## 2025-01-10 PROCEDURE — 1159F MED LIST DOCD IN RCRD: CPT | Performed by: STUDENT IN AN ORGANIZED HEALTH CARE EDUCATION/TRAINING PROGRAM

## 2025-01-10 PROCEDURE — 3074F SYST BP LT 130 MM HG: CPT | Performed by: STUDENT IN AN ORGANIZED HEALTH CARE EDUCATION/TRAINING PROGRAM

## 2025-01-10 PROCEDURE — 3079F DIAST BP 80-89 MM HG: CPT | Performed by: STUDENT IN AN ORGANIZED HEALTH CARE EDUCATION/TRAINING PROGRAM

## 2025-01-10 PROCEDURE — 1036F TOBACCO NON-USER: CPT | Performed by: STUDENT IN AN ORGANIZED HEALTH CARE EDUCATION/TRAINING PROGRAM

## 2025-01-10 PROCEDURE — 93000 ELECTROCARDIOGRAM COMPLETE: CPT | Performed by: STUDENT IN AN ORGANIZED HEALTH CARE EDUCATION/TRAINING PROGRAM

## 2025-01-10 PROCEDURE — 1160F RVW MEDS BY RX/DR IN RCRD: CPT | Performed by: STUDENT IN AN ORGANIZED HEALTH CARE EDUCATION/TRAINING PROGRAM

## 2025-01-10 PROCEDURE — 1157F ADVNC CARE PLAN IN RCRD: CPT | Performed by: STUDENT IN AN ORGANIZED HEALTH CARE EDUCATION/TRAINING PROGRAM

## 2025-01-10 NOTE — PROGRESS NOTES
Chief Complaint   Patient presents with    Follow-up     HPI:  I was requested by Dr. Galicia to evaluate this patient in consultation for cardiac assessment.    Patient 79-year-old female with a medical history of S/P PPM (11/2023), hypertension, hyperlipidemia, diabetes, atrial fibrillation on Eliquis who was admitted and seen by me at [facility] on 5/17/2022 for runs of atrial fibrillation with ambulation.      Problem #1 paroxysmal atrial fibrillation  - On Eliquis 5 mg twice daily.  -She denies any symptoms. She denies any palpitations/shortness of breath with exertion/chest pain/orthopnea PND/lower extremity edema. Also says no to dizziness/lightheadedness/syncopal episodes.  -No bleeding complications on the Eliquis.     Problem #2 hypertension  -Currently on lisinopril 40 mg daily and amlodipine 5 mg daily  -Normotensive in clinic today     Problem #3 diabetes concurrent with hyperlipidemia  -On lovastatin 20 mg LDL was 51 mg/dL in 2022.      Problem #4 S/P PPM (11/29/2023)  - Prior PPM placement  - EKG on Afib      Patient returned previously felling well. However, she states that she increases her weight weekly, and if improves with additional dose of Bumex. She is also non-compliant with salt restriction - for example, diet rich in sausages.     Patient reports that she has been admitted in December 2024 in another hospital due to GI bleeding - hemorrhoids. She is back to [facility] - [facility]. She will be schedule for Left Atrial Appendage Closure (LAAC) in [place].     Past Medical History  Past Medical History:   Diagnosis Date    Encounter for full-term uncomplicated delivery     Normal vaginal delivery    Encounter for gynecological examination (general) (routine) without abnormal findings 09/01/2020    Encounter for routine gynecological examination    Other conditions influencing health status     Menstruation    Other fecal abnormalities     Stool guaiac positive    Personal history of  other diseases of the circulatory system     History of CHF (congestive heart failure)    Personal history of other medical treatment 10/06/2021    H/O mammogram       Past Surgical History  Past Surgical History:   Procedure Laterality Date    CT ABDOMEN PELVIS ANGIOGRAM W AND/OR WO IV CONTRAST  07/21/2023    CT ABDOMEN PELVIS ANGIOGRAM W AND/OR WO IV CONTRAST 7/21/2023 [facility] CT    INSERT / REPLACE / REMOVE PACEMAKER      OTHER SURGICAL HISTORY  10/17/2019    Throat surgery    OTHER SURGICAL HISTORY  10/17/2019    Cholecystectomy    OTHER SURGICAL HISTORY  12/12/2019    Shoulder surgery    OTHER SURGICAL HISTORY  02/19/2020    Knee surgery    OTHER SURGICAL HISTORY  09/22/2020    Knee replacement    OTHER SURGICAL HISTORY  12/12/2019    Carpal tunnel surgery    OTHER SURGICAL HISTORY  12/12/2019    Thyroidectomy    OTHER SURGICAL HISTORY  12/12/2019    Cataract surgery       Past Family History  Family History   Problem Relation Name Age of Onset    Diabetes Mother      Hypertension Mother      Diabetes Father      Hypertension Father         Allergy History  Allergies   Allergen Reactions    Fenofibrate Hives    Oxycodone-Acetaminophen Unknown     vomitting    Aspirin Rash    Metformin Rash       Past Social History  Social History     Socioeconomic History    Marital status:    Tobacco Use    Smoking status: Never    Smokeless tobacco: Never   Vaping Use    Vaping status: Never Used   Substance and Sexual Activity    Alcohol use: Never    Drug use: Never    Sexual activity: Defer     Social Drivers of Health     Food Insecurity: No Food Insecurity (12/12/2024)    Received from [facility]    Hunger Vital Sign     Worried About Running Out of Food in the Last Year: Never true     Ran Out of Food in the Last Year: Never true   Transportation Needs: No Transportation Needs (12/12/2024)    Received from [facility]    PRAPARE - Transportation     Lack of Transportation (Medical): No     Lack of Transportation  "(Non-Medical): No   Intimate Partner Violence: Not At Risk (12/12/2024)    Received from [facility]    Humiliation, Afraid, Rape, and Kick questionnaire     Fear of Current or Ex-Partner: No     Emotionally Abused: No     Physically Abused: No     Sexually Abused: No   Housing Stability: Low Risk  (12/12/2024)    Received from [facility]    Housing Stability Vital Sign     Unable to Pay for Housing in the Last Year: No     Number of Times Moved in the Last Year: 0     Homeless in the Last Year: No       Social History     Tobacco Use   Smoking Status Never   Smokeless Tobacco Never     Review of Systems:  Constitutional: Denies any fever or chills  Eyes: Denies any eye pain or blurry vision  ENT: Denies any ear pain or hearing loss  Cardiovascular: The heart rate is not slow, the heart rate is not fast  Respiratory: Denies any asthma/wheezing  Gastrointestinal: Denies any daniela colored stools or fatty food intolerance  Genitourinary: Denies any blood in the urine or pelvic pain  Musculoskeletal: Denies any swelling in the joints or difficulty walking  Skin: Denies any skin lumps or skin lesions  Neurological: Denies any dizziness/tingling     Objective Data:  Last Recorded Vitals:  Vitals:    01/10/25 1020   BP: 128/80   Pulse: 65   SpO2: 97%   Weight: 81.6 kg (180 lb)   Height: 1.549 m (5' 1\")       Last Labs:  CBC - 12/12/2024: 12:20 PM  6.7 12.3 165    38.9      CMP - 12/11/2024:  6:58 PM  9.1 7.6 24 --- 1.0   _ 4.2 13 222      PTT - 12/11/2024:  6:58 PM  1.5   17.0 41     TROPHS   Date/Time Value Ref Range Status   07/21/2023 11:20 AM 26 0 - 13 ng/L Final     Comment:     .  Less than 99th percentile of normal range cutoff-  Female and children under 18 years old <14 ng/L; Male <21 ng/L: Negative  Repeat testing should be performed if clinically indicated.   .  Female and children under 18 years old 14-50 ng/L; Male 21-50 ng/L:  Consistent with possible cardiac damage and possible increased clinical   risk. " "Serial measurements may help to assess extent of myocardial damage.   .  >50 ng/L: Consistent with cardiac damage, increased clinical risk and  myocardial infarction. Serial measurements may help assess extent of   myocardial damage.   .   NOTE: Children less than 1 year old may have higher baseline troponin   levels and results should be interpreted in conjunction with the overall   clinical context.   .  NOTE: Troponin I testing is performed using a different   testing methodology at [facility] than at other   [facility] hospitals. Direct result comparisons should only   be made within the same method.       BNP   Date/Time Value Ref Range Status   09/06/2024 07:29  0 - 99 pg/mL Final   06/21/2024 09:15  0 - 99 pg/mL Final     HGBA1C   Date/Time Value Ref Range Status   08/01/2024 07:23 AM 7.9 see below % Final   05/01/2024 11:53 AM 8.1 see below % Final     LDLCALC   Date/Time Value Ref Range Status   05/01/2024 11:53 AM 39 <=99 mg/dL Final     Comment:                                 Near   Borderline      AGE      Desirable  Optimal    High     High     Very High     0-19 Y     0 - 109     ---    110-129   >/= 130     ----    20-24 Y     0 - 119     ---    120-159   >/= 160     ----      >24 Y     0 -  99   100-129  130-159   160-189     >/=190       VLDL   Date/Time Value Ref Range Status   05/01/2024 11:53 AM 66 0 - 40 mg/dL Final   06/23/2023 07:25 AM 22 0 - 40 mg/dL Final   07/20/2022 07:25 AM 25 0 - 40 mg/dL Final   06/01/2021 12:05 PM 57 0 - 40 mg/dL Final        Patient Medications:  Outpatient Encounter Medications as of 1/10/2025   Medication Sig Dispense Refill    acetaminophen 325 mg chewable tablet Chew if needed.      amiodarone (Pacerone) 200 mg tablet Take 1 tablet (200 mg) by mouth once daily. 30 tablet 5    aspirin 81 mg EC tablet Take 1 tablet (81 mg) by mouth once daily.      BD Ultra-Fine Micro Pen Needle 32 gauge x 1/4\" needle Up to three times daily 100 each 3    " bumetanide (Bumex) 2 mg tablet Take 1 tablet (2 mg) by mouth once daily. Take a second tablet if your weight goes up by 2 pounds. 90 tablet 3    calcium polycarbophiL (Fibercon) 625 mg tablet Take 1 tablet (625 mg) by mouth once daily.      dapagliflozin propanediol (Farxiga) 10 mg Take 1 tablet (10 mg) by mouth once every 24 hours. 90 tablet 3    dextran 70-hypromellose (Bion Tears) 0.1-0.3 % ophthalmic solution Administer 1 drop into both eyes 3 times a day as needed for dry eyes. 15 mL 11    Eliquis 5 mg tablet Take 1 tablet (5 mg) by mouth 2 times a day. 180 tablet 1    flash glucose scanning reader (FreeStyle Alannah 2 [place]) misc Use as instructed 1 each 1    flash glucose sensor kit (FreeStyle Alannah 2 Sensor) kit Use as instructed 1 each 3    insulin lispro protamin-lispro (HumaLOG Mix 75-25 KwikPen) 100 unit/mL (75-25) injection INJECT 15 UNITS SUBCUTANEOUSLY IN THE MORNING and 15 UNITS NIGHTLY 9 mL 3    levothyroxine (Synthroid, Levoxyl) 125 mcg tablet Take 1 tablet (125 mcg) by mouth early in the morning.. Take on an empty stomach at the same time each day, either 30 to 60 minutes prior to breakfast 30 tablet 11    lisinopril 40 mg tablet Take 1 tablet (40 mg) by mouth once daily.      loratadine (Claritin) 10 mg tablet Take 1 tablet (10 mg) by mouth 1 time if needed.      lovastatin (Mevacor) 20 mg tablet Take 1 tablet (20 mg) by mouth once daily at bedtime. 90 tablet 3    metoprolol tartrate (Lopressor) 50 mg tablet Take 1 tablet by mouth 2 times a day. 60 tablet 5    pantoprazole (ProtoNix) 40 mg EC tablet Take 1 tablet (40 mg) by mouth once daily in the morning. Take before meals. Do not crush, chew, or split.      potassium chloride CR 20 mEq ER tablet Take 1 tablet (20 mEq) by mouth 3 times a day. 60 tablet 11    sennosides-docusate sodium (Melissa-Colace) 8.6-50 mg tablet Take 1 tablet by mouth if needed for constipation.      amLODIPine (Norvasc) 10 mg tablet Take 1 tablet (10 mg) by mouth once daily.  (Patient not taking: Reported on 1/3/2025) 30 tablet 11    [DISCONTINUED] flash glucose sensor kit (FreeStyle Alannah 2 Sensor) kit Use as instructed 1 each 3    [DISCONTINUED] hydrocortisone (Anusol-HC) 25 mg suppository Insert 1 suppository (25 mg) into the rectum 2 times a day. (Patient not taking: Reported on 1/3/2025)       No facility-administered encounter medications on file as of 1/10/2025.       Physical Exam:  General: alert, oriented and in no acute distress  HEENT: NC/AT; EOMI; PERRLA, external ear is normal  Neck: supple; trachea midline; no masses; no JVD  Chest: diminished breath sounds bilaterally; on NC oxygen  Cardio: regular rhythm, S1S2 normal, no murmurs; Pacemaker  Abdomen: Soft, non-tender, non-distension, no organomegaly  Extremities: Edema 1+/3+ LE b/l  Neuro: Grossly intact     Psychiatric: Normal mood and affect    Past Cardiology Results (Last 3 Years):  EKG:  ECG 12 lead (Ancillary Performed) 11/15/2024      ECG 12 lead (Clinic Performed) 08/27/2024      ECG 12 lead (Clinic Performed) 12/04/2023    Echo:  Echo Results:  Transthoracic Echo (TTE) Complete 02/23/2024    [place] [address]  Phone [phone], Fax [phone]    TRANSTHORACIC ECHOCARDIOGRAM REPORT      Patient Name:      OGDARREL Hernandez Physician:    [id number] Adolph Palu MD  Study Date:        2/23/2024            Ordering Provider:    [id number] JENNIFER SELLERS  MRN/PID:           [id number]             Fellow:  Accession#:        [id number]         Nurse:                Stacia Jensen RN  Date of Birth/Age: 1945 / 78 years  Sonographer:          Quique Amado RDCS  Gender:            F                    Additional Staff:  Height:            154.94 cm            Admit Date:  Weight:            92.53 kg             Admission Status:     Outpatient  BSA / BMI:         1.91 m2 / 38.55      Department Location:  [facility] Echo Lab  kg/m2  Blood  Pressure: 82 /52 mmHg    Study Type:    TRANSTHORACIC ECHO (TTE) COMPLETE  Diagnosis/ICD: Unspecified atrial fibrillation-I48.91  CPT Codes:     Echo Complete w Full Doppler-85201  Study Detail: The following Echo studies were performed: 2D, M-Mode, Doppler and  color flow. Definity used as a contrast agent for endocardial  border definition. Total contrast used for this procedure was  2.00cc mL via IV push.      PHYSICIAN INTERPRETATION:  Left Ventricle: Left ventricular systolic function is normal, with an estimated ejection fraction of 55%. There are no regional wall motion abnormalities. The left ventricular cavity size is normal. Left ventricular diastolic filling was indeterminate.  Left Atrium: The left atrium is mildly dilated.  Right Ventricle: The right ventricle is normal in size. There is reduced right ventricular systolic function.  Right Atrium: The right atrium is normal in size.  Aortic Valve: The aortic valve was not well visualized. There is no evidence of aortic valve regurgitation. The peak instantaneous gradient of the aortic valve is 5.0 mmHg. The mean gradient of the aortic valve is 3.0 mmHg.  Mitral Valve: The mitral valve is normal in structure. There is mild mitral valve regurgitation.  Tricuspid Valve: The tricuspid valve is structurally normal. Tricuspid regurgitation was not assessed.  Pulmonic Valve: The pulmonic valve is not well visualized. There is moderate pulmonic valve regurgitation.  Pericardium: There is no pericardial effusion noted.  Aorta: The aortic root is normal.  Systemic Veins: The inferior vena cava appears to be of normal size. There is IVC inspiratory collapse greater than 50%.      CONCLUSIONS:  1. Left ventricular systolic function is normal with a 55% estimated ejection fraction.  2. There is reduced right ventricular systolic function.  3. Moderate pulmonic valve regurgitation.  4. Calculated pulmonary artery systolic pressure is 46 mmHg suggestive of pulmonary  hypertension.  5. Poorly visualized anatomical structures due to suboptimal image quality.    QUANTITATIVE DATA SUMMARY:  2D MEASUREMENTS:  Normal Ranges:  Ao Root d:     2.60 cm    (2.0-3.7cm)  LAs:           4.60 cm    (2.7-4.0cm)  IVSd:          1.31 cm    (0.6-1.1cm)  LVPWd:         1.00 cm    (0.6-1.1cm)  LVIDd:         5.01 cm    (3.9-5.9cm)  LVIDs:         3.96 cm  LV Mass Index: 116.6 g/m2  LV % FS        21.0 %    LA VOLUME:  Normal Ranges:  LA Vol A4C:        24.6 ml    (22+/-6mL/m2)  LA Vol A2C:        35.9 ml  LA Vol BP:         30.3 ml  LA Vol Index A4C:  12.9ml/m2  LA Vol Index A2C:  18.9 ml/m2  LA Vol Index BP:   15.9 ml/m2  LA Area A4C:       11.9 cm2  LA Area A2C:       14.7 cm2  LA Major Axis A4C: 4.9 cm  LA Major Axis A2C: 5.1 cm  LA Volume Index:   12.6 ml/m2  LA Vol A4C:        24.0 ml  LA Vol A2C:        36.0 ml    LV SYSTOLIC FUNCTION BY 2D PLANIMETRY (MOD):  Normal Ranges:  EF-A4C View: 53.8 % (>=55%)  EF-A2C View: 54.1 %  EF-Biplane:  52.3 %    LV DIASTOLIC FUNCTION:  Normal Ranges:  MV Peak E:    1.13 m/s (0.7-1.2 m/s)  MV Peak A:    0.52 m/s (0.42-0.7 m/s)  E/A Ratio:    2.17     (1.0-2.2)  MV lateral e' 0.09 m/s  MV medial e'  0.07 m/s    MITRAL VALVE:  Normal Ranges:  MV DT: 211 msec (150-240msec)    AORTIC VALVE:  Normal Ranges:  AoV Vmax:                1.12 m/s (<=1.7m/s)  AoV Peak P.0 mmHg (<20mmHg)  AoV Mean PG:             3.0 mmHg (1.7-11.5mmHg)  LVOT Max Dwayne:            0.89 m/s (<=1.1m/s)  AoV VTI:                 29.50 cm (18-25cm)  LVOT VTI:                15.80 cm  LVOT Diameter:           1.50 cm  (1.8-2.4cm)  AoV Area, VTI:           0.95 cm2 (2.5-5.5cm2)  AoV Area,Vmax:           1.40 cm2 (2.5-4.5cm2)  AoV Dimensionless Index: 0.54      RIGHT VENTRICLE:  TAPSE: 15.2 mm  RV s'  0.07 m/s    TRICUSPID VALVE/RVSP:  Normal Ranges:  Peak TR Velocity: 3.68 m/s  RV Syst Pressure: 57.2 mmHg (< 30mmHg)    PULMONIC VALVE:  Normal Ranges:  PV Accel Time: 81 msec   (>120ms)  PIEDV:         1.19 m/s  PADP:          8.7 mmHg      [id number] Adolph Paul MD  Electronically signed on 2/23/2024 at 12:55:07 PM        ** Final **     Cath:  No results found for this or any previous visit from the past 1095 days.    CV NCDR CATHPCI V5 COLLECTION FORM   Stress Test:  No results found for this or any previous visit from the past 1095 days.    Cardiac Imaging:  No results found for this or any previous visit from the past 1095 days.       Assessment/Plan     In summary, Mrs. Shearer is an 79-year-old female with a medical history of S/P PPM (2023), hypertension, hyperlipidemia, diabetes, atrial fibrillation on Eliquis who was admitted and seen by me at [facility] on 5/17/2022 for runs of atrial fibrillation with ambulation.     Assessment     # Heart Failure with preserved LVEF (LVEF ~55%) / S/P PPM (11/29/2023)  - Patient returns today complaining of worsening SOB, with edema in both legs after PPM placement   - Last echo from 2020 with normal LVEF  - New echocardiogram (02/23/2024):   1. Left ventricular systolic function is normal with a 55% estimated ejection fraction.   2. There is reduced right ventricular systolic function.   3. Moderate pulmonic valve regurgitation.   4. Calculated pulmonary artery systolic pressure is 46 mmHg suggestive of pulmonary hypertension.   5. Poorly visualized anatomical structures due to suboptimal image quality.  - Patient have not started higher dose of Bumex as suggested before.  - Keep Bumex to 2mg daily   - Keep Lisinopril 40mg daily  - Patient returned previously felling well. However, she states that she increased her weight in 3lb this week and is feeling a liitle bit more shorness of breath than usual. She is also non-compliant with salt restriction - for example, diet rich in sausages.  - Keep Metoprolol tartrate 100mg BID.  - Notably, patient had a positive stress test in 2017 per anesthesia assessment. It was ultimately  though to be related to cardiac scar. Cardiac MRI did not show signs of amyloidosis. Patient also has pulmonary hypertension. Therefore we will request Right and Left Heart Catheterization.  - The natural history of atherosclerosis was discussed with the patient. The treatment options including GDMT, percutaneous intervention or surgical intervention were discussed with the patient. All the risks, benefits and alternative procedures were discussed. Complications of the procedure were also discussed, including but not limited to risk of bleeding, stroke, infarct, infection, urgent cardiac surgery or death. All questions were answered and the informed consent was obtained. After aforementioned testing and consultation, Left Heart Catheterization with potential Percutaneous Coronary Intervention would be a reasonable approach if the patient is candidate.  - She will be referred for Watchman procedure.  - Follow up in heart failure clinic.   - Follow up in 6 months.     # Paroxysmal atrial fibrillation with history of recurrent falls / GI bleeding  - Elevated [id number] score of 3 which implies higher risk for thromboembolic events yearly, therefore should continue on stroke prophylaxis with DOAC - Eliquis 5mg BID.  - Patient is currently asymptomatic on rate control strategy with Metoprolol and Eliquis.   - Prior EKG showing atrial fibrillation.  - Prior echocardiogram shows normal left ventricle ejection fraction  - We had a thorough conversation about the triggers for atrial fibrillation, including alcohol, caffeine and chocolate.  - Counseled to exercise for better conditioning, for losing weight and to lower the baseline heart rate.  - Patient reports that she has been admitted in December 2024 in another hospital due to GI bleeding - hemorrhoids. She is back to DOAC - Eliquis. She will be schedule for Left Atrial Appendage Closure (LAAC) in [place].   - Patient evaluated by EP team - will be referred to Left  Atrial Appendage Closure (LAAC) in [place] with Dr. Barrientos.    -                           Shared Decision Tool - Referral for Left Atrial Appendage Occlusion    My patient Maira Shearer 1945 has been evaluated by me and is referred to [facility] for a left atrial appendage implant due thromboembolic stroke risk from atrial fibrillation with CHADS2 score >= 2 or a [id number] score >= 3.    This patient is not a good candidate for long term anticoagulation for the following reason(s):    This patient however should be able to tolerate short term anticoagulation as necessary for LAAO device implant.  My Patient and I, the referring physician, have reviewed the following:  Yes  Atrial Fibrillation increases the risk of stroke.  Yes Anticoagulants or blood thinners help reduce the risk of stroke for most people.  Yes    Blood thinners may cause minor or serious bleeding issues.  Yes  Blood thinners include the medications aspirin, warfarin, and NOAC (dabigatran, edoxaban, rivaroxaban, apixaban...), each with unique risk and safety profiles.  Yes We reviewed his/her anticoagulation history and previous experiences.  Yes We discussed lack of other alternative treatments available to prevent stroke risk.  Yes We discussed the LAAO device implant vs taking anticoagulation to reduce stroke risk.  Yes We referred the patient to a LAAO outpatient clinic for further explanation and consideration.          Yes The LAAO device has been adequately explained along with the risks and benefits of treatment. Alternative treatment has been discussed with all questions answered. After discussion of the above considerations, it has been decided to be evaluated for the LAAO therapies.    Reviewed and approved by JENNIFER VINSON on 1/10/25 at 11:09 AM.         # Hypertension  - Hypertensive on current regimen  - Keep Amlodipine 5mg daily, Metoprolol tartrate 100mg BID  - Keep Lisinopril 40mg daily.     # Diabetes &  Hyperlipidemia  - LDL 39, HDL 30, Tri 329.  - Controlled by PCP.  - Keep home medication with Lovastatin 20mg daily.  - Patient allergic to Fenofibrate.   - Counseled on healthy diet and regular exercise.      # Hypothyroidism  - Keep Levothyroxine 137mcg daily     # S/P PPM (11/29/2023)  - Recent PPM placement  - EKG on Afib      We have discussed the most common side effects of the prescribed medications, indications, drug interactions, risks, complications, and alternatives of medications/therapeutics were explained and discussed. The patient has been requested to monitor closely for any untoward side effects or complications of medications. The patient has been strongly advised to be compliant with the recommendations, all the questions and concerns have been addressed. The patient has been also instructed to call, to return sooner or to go to the emergency department if symptoms persist or get worsen. The patient voiced understanding and denies any further questions at this time.      This note was transcribed using the Dragon Dictation system. There may be grammatical, punctuation, or verbiage errors that occur with voice recognition programs.     Counseling greater than 50% of visit regarding all cardiac issues.     Thank you for allowing me to participate in the care of this patient. Please do not hesitate to contact me with any further questions or concerns.     Benjamin Baker MD  Cardiology

## 2025-01-10 NOTE — H&P (VIEW-ONLY)
Chief Complaint   Patient presents with    Follow-up     HPI:  I was requested by Dr. Galicia to evaluate this patient in consultation for cardiac assessment.    Patient 79-year-old female with a medical history of S/P PPM (11/2023), hypertension, hyperlipidemia, diabetes, atrial fibrillation on Eliquis who was admitted and seen by me at [facility] on 5/17/2022 for runs of atrial fibrillation with ambulation.      Problem #1 paroxysmal atrial fibrillation  - On Eliquis 5 mg twice daily.  -She denies any symptoms. She denies any palpitations/shortness of breath with exertion/chest pain/orthopnea PND/lower extremity edema. Also says no to dizziness/lightheadedness/syncopal episodes.  -No bleeding complications on the Eliquis.     Problem #2 hypertension  -Currently on lisinopril 40 mg daily and amlodipine 5 mg daily  -Normotensive in clinic today     Problem #3 diabetes concurrent with hyperlipidemia  -On lovastatin 20 mg LDL was 51 mg/dL in 2022.      Problem #4 S/P PPM (11/29/2023)  - Prior PPM placement  - EKG on Afib      Patient returned previously felling well. However, she states that she increases her weight weekly, and if improves with additional dose of Bumex. She is also non-compliant with salt restriction - for example, diet rich in sausages.     Patient reports that she has been admitted in December 2024 in another hospital due to GI bleeding - hemorrhoids. She is back to [facility] - [facility]. She will be schedule for Left Atrial Appendage Closure (LAAC) in [place].     Past Medical History  Past Medical History:   Diagnosis Date    Encounter for full-term uncomplicated delivery     Normal vaginal delivery    Encounter for gynecological examination (general) (routine) without abnormal findings 09/01/2020    Encounter for routine gynecological examination    Other conditions influencing health status     Menstruation    Other fecal abnormalities     Stool guaiac positive    Personal history of  other diseases of the circulatory system     History of CHF (congestive heart failure)    Personal history of other medical treatment 10/06/2021    H/O mammogram       Past Surgical History  Past Surgical History:   Procedure Laterality Date    CT ABDOMEN PELVIS ANGIOGRAM W AND/OR WO IV CONTRAST  07/21/2023    CT ABDOMEN PELVIS ANGIOGRAM W AND/OR WO IV CONTRAST 7/21/2023 [facility] CT    INSERT / REPLACE / REMOVE PACEMAKER      OTHER SURGICAL HISTORY  10/17/2019    Throat surgery    OTHER SURGICAL HISTORY  10/17/2019    Cholecystectomy    OTHER SURGICAL HISTORY  12/12/2019    Shoulder surgery    OTHER SURGICAL HISTORY  02/19/2020    Knee surgery    OTHER SURGICAL HISTORY  09/22/2020    Knee replacement    OTHER SURGICAL HISTORY  12/12/2019    Carpal tunnel surgery    OTHER SURGICAL HISTORY  12/12/2019    Thyroidectomy    OTHER SURGICAL HISTORY  12/12/2019    Cataract surgery       Past Family History  Family History   Problem Relation Name Age of Onset    Diabetes Mother      Hypertension Mother      Diabetes Father      Hypertension Father         Allergy History  Allergies   Allergen Reactions    Fenofibrate Hives    Oxycodone-Acetaminophen Unknown     vomitting    Aspirin Rash    Metformin Rash       Past Social History  Social History     Socioeconomic History    Marital status:    Tobacco Use    Smoking status: Never    Smokeless tobacco: Never   Vaping Use    Vaping status: Never Used   Substance and Sexual Activity    Alcohol use: Never    Drug use: Never    Sexual activity: Defer     Social Drivers of Health     Food Insecurity: No Food Insecurity (12/12/2024)    Received from [facility]    Hunger Vital Sign     Worried About Running Out of Food in the Last Year: Never true     Ran Out of Food in the Last Year: Never true   Transportation Needs: No Transportation Needs (12/12/2024)    Received from [facility]    PRAPARE - Transportation     Lack of Transportation (Medical): No     Lack of Transportation  "(Non-Medical): No   Intimate Partner Violence: Not At Risk (12/12/2024)    Received from [facility]    Humiliation, Afraid, Rape, and Kick questionnaire     Fear of Current or Ex-Partner: No     Emotionally Abused: No     Physically Abused: No     Sexually Abused: No   Housing Stability: Low Risk  (12/12/2024)    Received from [facility]    Housing Stability Vital Sign     Unable to Pay for Housing in the Last Year: No     Number of Times Moved in the Last Year: 0     Homeless in the Last Year: No       Social History     Tobacco Use   Smoking Status Never   Smokeless Tobacco Never     Review of Systems:  Constitutional: Denies any fever or chills  Eyes: Denies any eye pain or blurry vision  ENT: Denies any ear pain or hearing loss  Cardiovascular: The heart rate is not slow, the heart rate is not fast  Respiratory: Denies any asthma/wheezing  Gastrointestinal: Denies any daniela colored stools or fatty food intolerance  Genitourinary: Denies any blood in the urine or pelvic pain  Musculoskeletal: Denies any swelling in the joints or difficulty walking  Skin: Denies any skin lumps or skin lesions  Neurological: Denies any dizziness/tingling     Objective Data:  Last Recorded Vitals:  Vitals:    01/10/25 1020   BP: 128/80   Pulse: 65   SpO2: 97%   Weight: 81.6 kg (180 lb)   Height: 1.549 m (5' 1\")       Last Labs:  CBC - 12/12/2024: 12:20 PM  6.7 12.3 165    38.9      CMP - 12/11/2024:  6:58 PM  9.1 7.6 24 --- 1.0   _ 4.2 13 222      PTT - 12/11/2024:  6:58 PM  1.5   17.0 41     TROPHS   Date/Time Value Ref Range Status   07/21/2023 11:20 AM 26 0 - 13 ng/L Final     Comment:     .  Less than 99th percentile of normal range cutoff-  Female and children under 18 years old <14 ng/L; Male <21 ng/L: Negative  Repeat testing should be performed if clinically indicated.   .  Female and children under 18 years old 14-50 ng/L; Male 21-50 ng/L:  Consistent with possible cardiac damage and possible increased clinical   risk. " "Serial measurements may help to assess extent of myocardial damage.   .  >50 ng/L: Consistent with cardiac damage, increased clinical risk and  myocardial infarction. Serial measurements may help assess extent of   myocardial damage.   .   NOTE: Children less than 1 year old may have higher baseline troponin   levels and results should be interpreted in conjunction with the overall   clinical context.   .  NOTE: Troponin I testing is performed using a different   testing methodology at [facility] than at other   [facility] hospitals. Direct result comparisons should only   be made within the same method.       BNP   Date/Time Value Ref Range Status   09/06/2024 07:29  0 - 99 pg/mL Final   06/21/2024 09:15  0 - 99 pg/mL Final     HGBA1C   Date/Time Value Ref Range Status   08/01/2024 07:23 AM 7.9 see below % Final   05/01/2024 11:53 AM 8.1 see below % Final     LDLCALC   Date/Time Value Ref Range Status   05/01/2024 11:53 AM 39 <=99 mg/dL Final     Comment:                                 Near   Borderline      AGE      Desirable  Optimal    High     High     Very High     0-19 Y     0 - 109     ---    110-129   >/= 130     ----    20-24 Y     0 - 119     ---    120-159   >/= 160     ----      >24 Y     0 -  99   100-129  130-159   160-189     >/=190       VLDL   Date/Time Value Ref Range Status   05/01/2024 11:53 AM 66 0 - 40 mg/dL Final   06/23/2023 07:25 AM 22 0 - 40 mg/dL Final   07/20/2022 07:25 AM 25 0 - 40 mg/dL Final   06/01/2021 12:05 PM 57 0 - 40 mg/dL Final        Patient Medications:  Outpatient Encounter Medications as of 1/10/2025   Medication Sig Dispense Refill    acetaminophen 325 mg chewable tablet Chew if needed.      amiodarone (Pacerone) 200 mg tablet Take 1 tablet (200 mg) by mouth once daily. 30 tablet 5    aspirin 81 mg EC tablet Take 1 tablet (81 mg) by mouth once daily.      BD Ultra-Fine Micro Pen Needle 32 gauge x 1/4\" needle Up to three times daily 100 each 3    " bumetanide (Bumex) 2 mg tablet Take 1 tablet (2 mg) by mouth once daily. Take a second tablet if your weight goes up by 2 pounds. 90 tablet 3    calcium polycarbophiL (Fibercon) 625 mg tablet Take 1 tablet (625 mg) by mouth once daily.      dapagliflozin propanediol (Farxiga) 10 mg Take 1 tablet (10 mg) by mouth once every 24 hours. 90 tablet 3    dextran 70-hypromellose (Bion Tears) 0.1-0.3 % ophthalmic solution Administer 1 drop into both eyes 3 times a day as needed for dry eyes. 15 mL 11    Eliquis 5 mg tablet Take 1 tablet (5 mg) by mouth 2 times a day. 180 tablet 1    flash glucose scanning reader (FreeStyle Alannah 2 [place]) misc Use as instructed 1 each 1    flash glucose sensor kit (FreeStyle Alannah 2 Sensor) kit Use as instructed 1 each 3    insulin lispro protamin-lispro (HumaLOG Mix 75-25 KwikPen) 100 unit/mL (75-25) injection INJECT 15 UNITS SUBCUTANEOUSLY IN THE MORNING and 15 UNITS NIGHTLY 9 mL 3    levothyroxine (Synthroid, Levoxyl) 125 mcg tablet Take 1 tablet (125 mcg) by mouth early in the morning.. Take on an empty stomach at the same time each day, either 30 to 60 minutes prior to breakfast 30 tablet 11    lisinopril 40 mg tablet Take 1 tablet (40 mg) by mouth once daily.      loratadine (Claritin) 10 mg tablet Take 1 tablet (10 mg) by mouth 1 time if needed.      lovastatin (Mevacor) 20 mg tablet Take 1 tablet (20 mg) by mouth once daily at bedtime. 90 tablet 3    metoprolol tartrate (Lopressor) 50 mg tablet Take 1 tablet by mouth 2 times a day. 60 tablet 5    pantoprazole (ProtoNix) 40 mg EC tablet Take 1 tablet (40 mg) by mouth once daily in the morning. Take before meals. Do not crush, chew, or split.      potassium chloride CR 20 mEq ER tablet Take 1 tablet (20 mEq) by mouth 3 times a day. 60 tablet 11    sennosides-docusate sodium (Melissa-Colace) 8.6-50 mg tablet Take 1 tablet by mouth if needed for constipation.      amLODIPine (Norvasc) 10 mg tablet Take 1 tablet (10 mg) by mouth once daily.  (Patient not taking: Reported on 1/3/2025) 30 tablet 11    [DISCONTINUED] flash glucose sensor kit (FreeStyle Alannah 2 Sensor) kit Use as instructed 1 each 3    [DISCONTINUED] hydrocortisone (Anusol-HC) 25 mg suppository Insert 1 suppository (25 mg) into the rectum 2 times a day. (Patient not taking: Reported on 1/3/2025)       No facility-administered encounter medications on file as of 1/10/2025.       Physical Exam:  General: alert, oriented and in no acute distress  HEENT: NC/AT; EOMI; PERRLA, external ear is normal  Neck: supple; trachea midline; no masses; no JVD  Chest: diminished breath sounds bilaterally; on NC oxygen  Cardio: regular rhythm, S1S2 normal, no murmurs; Pacemaker  Abdomen: Soft, non-tender, non-distension, no organomegaly  Extremities: Edema 1+/3+ LE b/l  Neuro: Grossly intact     Psychiatric: Normal mood and affect    Past Cardiology Results (Last 3 Years):  EKG:  ECG 12 lead (Ancillary Performed) 11/15/2024      ECG 12 lead (Clinic Performed) 08/27/2024      ECG 12 lead (Clinic Performed) 12/04/2023    Echo:  Echo Results:  Transthoracic Echo (TTE) Complete 02/23/2024    [place] [address]  Phone [phone], Fax [phone]    TRANSTHORACIC ECHOCARDIOGRAM REPORT      Patient Name:      OGDARREL Hernandez Physician:    [id number] Adolph Paul MD  Study Date:        2/23/2024            Ordering Provider:    [id number] JENNIFER SELLERS  MRN/PID:           [id number]             Fellow:  Accession#:        [id number]         Nurse:                Stacia Jensen RN  Date of Birth/Age: 1945 / 78 years  Sonographer:          Quique Amado RDCS  Gender:            F                    Additional Staff:  Height:            154.94 cm            Admit Date:  Weight:            92.53 kg             Admission Status:     Outpatient  BSA / BMI:         1.91 m2 / 38.55      Department Location:  [facility] Echo Lab  kg/m2  Blood  Pressure: 82 /52 mmHg    Study Type:    TRANSTHORACIC ECHO (TTE) COMPLETE  Diagnosis/ICD: Unspecified atrial fibrillation-I48.91  CPT Codes:     Echo Complete w Full Doppler-[id number]  Study Detail: The following Echo studies were performed: 2D, M-Mode, Doppler and  color flow. Definity used as a contrast agent for endocardial  border definition. Total contrast used for this procedure was  2.00cc mL via IV push.      PHYSICIAN INTERPRETATION:  Left Ventricle: Left ventricular systolic function is normal, with an estimated ejection fraction of 55%. There are no regional wall motion abnormalities. The left ventricular cavity size is normal. Left ventricular diastolic filling was indeterminate.  Left Atrium: The left atrium is mildly dilated.  Right Ventricle: The right ventricle is normal in size. There is reduced right ventricular systolic function.  Right Atrium: The right atrium is normal in size.  Aortic Valve: The aortic valve was not well visualized. There is no evidence of aortic valve regurgitation. The peak instantaneous gradient of the aortic valve is 5.0 mmHg. The mean gradient of the aortic valve is 3.0 mmHg.  Mitral Valve: The mitral valve is normal in structure. There is mild mitral valve regurgitation.  Tricuspid Valve: The tricuspid valve is structurally normal. Tricuspid regurgitation was not assessed.  Pulmonic Valve: The pulmonic valve is not well visualized. There is moderate pulmonic valve regurgitation.  Pericardium: There is no pericardial effusion noted.  Aorta: The aortic root is normal.  Systemic Veins: The inferior vena cava appears to be of normal size. There is IVC inspiratory collapse greater than 50%.      CONCLUSIONS:  1. Left ventricular systolic function is normal with a 55% estimated ejection fraction.  2. There is reduced right ventricular systolic function.  3. Moderate pulmonic valve regurgitation.  4. Calculated pulmonary artery systolic pressure is 46 mmHg suggestive of pulmonary  hypertension.  5. Poorly visualized anatomical structures due to suboptimal image quality.    QUANTITATIVE DATA SUMMARY:  2D MEASUREMENTS:  Normal Ranges:  Ao Root d:     2.60 cm    (2.0-3.7cm)  LAs:           4.60 cm    (2.7-4.0cm)  IVSd:          1.31 cm    (0.6-1.1cm)  LVPWd:         1.00 cm    (0.6-1.1cm)  LVIDd:         5.01 cm    (3.9-5.9cm)  LVIDs:         3.96 cm  LV Mass Index: 116.6 g/m2  LV % FS        21.0 %    LA VOLUME:  Normal Ranges:  LA Vol A4C:        24.6 ml    (22+/-6mL/m2)  LA Vol A2C:        35.9 ml  LA Vol BP:         30.3 ml  LA Vol Index A4C:  12.9ml/m2  LA Vol Index A2C:  18.9 ml/m2  LA Vol Index BP:   15.9 ml/m2  LA Area A4C:       11.9 cm2  LA Area A2C:       14.7 cm2  LA Major Axis A4C: 4.9 cm  LA Major Axis A2C: 5.1 cm  LA Volume Index:   12.6 ml/m2  LA Vol A4C:        24.0 ml  LA Vol A2C:        36.0 ml    LV SYSTOLIC FUNCTION BY 2D PLANIMETRY (MOD):  Normal Ranges:  EF-A4C View: 53.8 % (>=55%)  EF-A2C View: 54.1 %  EF-Biplane:  52.3 %    LV DIASTOLIC FUNCTION:  Normal Ranges:  MV Peak E:    1.13 m/s (0.7-1.2 m/s)  MV Peak A:    0.52 m/s (0.42-0.7 m/s)  E/A Ratio:    2.17     (1.0-2.2)  MV lateral e' 0.09 m/s  MV medial e'  0.07 m/s    MITRAL VALVE:  Normal Ranges:  MV DT: 211 msec (150-240msec)    AORTIC VALVE:  Normal Ranges:  AoV Vmax:                1.12 m/s (<=1.7m/s)  AoV Peak P.0 mmHg (<20mmHg)  AoV Mean PG:             3.0 mmHg (1.7-11.5mmHg)  LVOT Max Dwayne:            0.89 m/s (<=1.1m/s)  AoV VTI:                 29.50 cm (18-25cm)  LVOT VTI:                15.80 cm  LVOT Diameter:           1.50 cm  (1.8-2.4cm)  AoV Area, VTI:           0.95 cm2 (2.5-5.5cm2)  AoV Area,Vmax:           1.40 cm2 (2.5-4.5cm2)  AoV Dimensionless Index: 0.54      RIGHT VENTRICLE:  TAPSE: 15.2 mm  RV s'  0.07 m/s    TRICUSPID VALVE/RVSP:  Normal Ranges:  Peak TR Velocity: 3.68 m/s  RV Syst Pressure: 57.2 mmHg (< 30mmHg)    PULMONIC VALVE:  Normal Ranges:  PV Accel Time: 81 msec   (>120ms)  PIEDV:         1.19 m/s  PADP:          8.7 mmHg      [id number] Adolph Paul MD  Electronically signed on 2/23/2024 at 12:55:07 PM        ** Final **     Cath:  No results found for this or any previous visit from the past 1095 days.    CV NCDR CATHPCI V5 COLLECTION FORM   Stress Test:  No results found for this or any previous visit from the past 1095 days.    Cardiac Imaging:  No results found for this or any previous visit from the past 1095 days.       Assessment/Plan     In summary, Mrs. Shearer is an 79-year-old female with a medical history of S/P PPM (2023), hypertension, hyperlipidemia, diabetes, atrial fibrillation on Eliquis who was admitted and seen by me at [facility] on 5/17/2022 for runs of atrial fibrillation with ambulation.     Assessment     # Heart Failure with preserved LVEF (LVEF ~55%) / S/P PPM (11/29/2023)  - Patient returns today complaining of worsening SOB, with edema in both legs after PPM placement   - Last echo from 2020 with normal LVEF  - New echocardiogram (02/23/2024):   1. Left ventricular systolic function is normal with a 55% estimated ejection fraction.   2. There is reduced right ventricular systolic function.   3. Moderate pulmonic valve regurgitation.   4. Calculated pulmonary artery systolic pressure is 46 mmHg suggestive of pulmonary hypertension.   5. Poorly visualized anatomical structures due to suboptimal image quality.  - Patient have not started higher dose of Bumex as suggested before.  - Keep Bumex to 2mg daily   - Keep Lisinopril 40mg daily  - Patient returned previously felling well. However, she states that she increased her weight in 3lb this week and is feeling a liitle bit more shorness of breath than usual. She is also non-compliant with salt restriction - for example, diet rich in sausages.  - Keep Metoprolol tartrate 100mg BID.  - Notably, patient had a positive stress test in 2017 per anesthesia assessment. It was ultimately  though to be related to cardiac scar. Cardiac MRI did not show signs of amyloidosis. Patient also has pulmonary hypertension. Therefore we will request Right and Left Heart Catheterization.  - The natural history of atherosclerosis was discussed with the patient. The treatment options including GDMT, percutaneous intervention or surgical intervention were discussed with the patient. All the risks, benefits and alternative procedures were discussed. Complications of the procedure were also discussed, including but not limited to risk of bleeding, stroke, infarct, infection, urgent cardiac surgery or death. All questions were answered and the informed consent was obtained. After aforementioned testing and consultation, Left Heart Catheterization with potential Percutaneous Coronary Intervention would be a reasonable approach if the patient is candidate.  - She will be referred for Watchman procedure.  - Follow up in heart failure clinic.   - Follow up in 6 months.     # Paroxysmal atrial fibrillation with history of recurrent falls / GI bleeding  - Elevated [id number] score of 3 which implies higher risk for thromboembolic events yearly, therefore should continue on stroke prophylaxis with DOAC - Eliquis 5mg BID.  - Patient is currently asymptomatic on rate control strategy with Metoprolol and Eliquis.   - Prior EKG showing atrial fibrillation.  - Prior echocardiogram shows normal left ventricle ejection fraction  - We had a thorough conversation about the triggers for atrial fibrillation, including alcohol, caffeine and chocolate.  - Counseled to exercise for better conditioning, for losing weight and to lower the baseline heart rate.  - Patient reports that she has been admitted in December 2024 in another hospital due to GI bleeding - hemorrhoids. She is back to DOAC - Eliquis. She will be schedule for Left Atrial Appendage Closure (LAAC) in [place].   - Patient evaluated by EP team - will be referred to Left  Atrial Appendage Closure (LAAC) in [place] with Dr. Barrientos.    -                           Shared Decision Tool - Referral for Left Atrial Appendage Occlusion    My patient Maira Shearer 1945 has been evaluated by me and is referred to [facility] for a left atrial appendage implant due thromboembolic stroke risk from atrial fibrillation with CHADS2 score >= 2 or a [id number] score >= 3.    This patient is not a good candidate for long term anticoagulation for the following reason(s):    This patient however should be able to tolerate short term anticoagulation as necessary for LAAO device implant.  My Patient and I, the referring physician, have reviewed the following:  Yes  Atrial Fibrillation increases the risk of stroke.  Yes Anticoagulants or blood thinners help reduce the risk of stroke for most people.  Yes    Blood thinners may cause minor or serious bleeding issues.  Yes  Blood thinners include the medications aspirin, warfarin, and NOAC (dabigatran, edoxaban, rivaroxaban, apixaban...), each with unique risk and safety profiles.  Yes We reviewed his/her anticoagulation history and previous experiences.  Yes We discussed lack of other alternative treatments available to prevent stroke risk.  Yes We discussed the LAAO device implant vs taking anticoagulation to reduce stroke risk.  Yes We referred the patient to a LAAO outpatient clinic for further explanation and consideration.          Yes The LAAO device has been adequately explained along with the risks and benefits of treatment. Alternative treatment has been discussed with all questions answered. After discussion of the above considerations, it has been decided to be evaluated for the LAAO therapies.    Reviewed and approved by JENNIFER VINSON on 1/10/25 at 11:09 AM.         # Hypertension  - Hypertensive on current regimen  - Keep Amlodipine 5mg daily, Metoprolol tartrate 100mg BID  - Keep Lisinopril 40mg daily.     # Diabetes &  Hyperlipidemia  - LDL 39, HDL 30, Tri 329.  - Controlled by PCP.  - Keep home medication with Lovastatin 20mg daily.  - Patient allergic to Fenofibrate.   - Counseled on healthy diet and regular exercise.      # Hypothyroidism  - Keep Levothyroxine 137mcg daily     # S/P PPM (11/29/2023)  - Recent PPM placement  - EKG on Afib      We have discussed the most common side effects of the prescribed medications, indications, drug interactions, risks, complications, and alternatives of medications/therapeutics were explained and discussed. The patient has been requested to monitor closely for any untoward side effects or complications of medications. The patient has been strongly advised to be compliant with the recommendations, all the questions and concerns have been addressed. The patient has been also instructed to call, to return sooner or to go to the emergency department if symptoms persist or get worsen. The patient voiced understanding and denies any further questions at this time.      This note was transcribed using the Dragon Dictation system. There may be grammatical, punctuation, or verbiage errors that occur with voice recognition programs.     Counseling greater than 50% of visit regarding all cardiac issues.     Thank you for allowing me to participate in the care of this patient. Please do not hesitate to contact me with any further questions or concerns.     Benjamin Baker MD  Cardiology

## 2025-01-14 ENCOUNTER — TELEPHONE (OUTPATIENT)
Dept: CARDIOLOGY | Facility: CLINIC | Age: 80
End: 2025-01-14
Payer: COMMERCIAL

## 2025-01-14 DIAGNOSIS — I50.32 CHRONIC DIASTOLIC (CONGESTIVE) HEART FAILURE: ICD-10-CM

## 2025-01-14 PROBLEM — I50.9 DYSPNEA DUE TO CONGESTIVE HEART FAILURE: Status: ACTIVE | Noted: 2025-01-10

## 2025-01-14 PROBLEM — R06.02 SHORTNESS OF BREATH: Status: ACTIVE | Noted: 2025-01-10

## 2025-01-14 NOTE — TELEPHONE ENCOUNTER
Lionel notififed of Left and right heart cath scheduled on 2/6/25. Instructed to have transportation to and from the hospital the day of the procedure. Cath lab will call the day before with show up time. Bring a list of current medications. Hold Eliquis 2 days before, Insilin Long acting- take half dose the night before. Short acting- hold the morning of. Otherwise may take all morning medications with a few sips of water the morning of. May have a piece of plain toast and water up to 2 hours before the procedure. Labs have been ordered. Instructions and follow up visit mailed to Lionel at [address] [place] [address].

## 2025-01-24 ENCOUNTER — TELEPHONE (OUTPATIENT)
Dept: PRIMARY CARE | Facility: CLINIC | Age: 80
End: 2025-01-24
Payer: COMMERCIAL

## 2025-01-24 DIAGNOSIS — I10 PRIMARY HYPERTENSION: ICD-10-CM

## 2025-01-24 DIAGNOSIS — E11.9 TYPE 2 DIABETES MELLITUS WITHOUT COMPLICATION, WITHOUT LONG-TERM CURRENT USE OF INSULIN (MULTI): ICD-10-CM

## 2025-01-24 RX ORDER — INSULIN LISPRO 100 [IU]/ML
INJECTION, SUSPENSION SUBCUTANEOUS
Qty: 9 ML | Refills: 3 | Status: SHIPPED | OUTPATIENT
Start: 2025-01-24

## 2025-01-24 RX ORDER — FLASH GLUCOSE SENSOR
KIT MISCELLANEOUS
Qty: 1 EACH | Refills: 3 | Status: SHIPPED | OUTPATIENT
Start: 2025-01-24

## 2025-01-24 RX ORDER — METOPROLOL TARTRATE 50 MG/1
50 TABLET ORAL 2 TIMES DAILY
Qty: 60 TABLET | Refills: 5 | Status: SHIPPED | OUTPATIENT
Start: 2025-01-24 | End: 2025-07-23

## 2025-01-24 RX ORDER — FLASH GLUCOSE SCANNING READER
EACH MISCELLANEOUS
Qty: 1 EACH | Refills: 1 | Status: SHIPPED | OUTPATIENT
Start: 2025-01-24

## 2025-01-24 NOTE — TELEPHONE ENCOUNTER
Requesting refills for Alannah 2, Metoprolol, Insulin pens  [facility] didn't transfer those    [facility]     Please let Lionel know when this is done

## 2025-02-01 LAB
ANION GAP SERPL CALCULATED.4IONS-SCNC: 14 MMOL/L (CALC) (ref 7–17)
BUN SERPL-MCNC: 30 MG/DL (ref 7–25)
BUN/CREAT SERPL: 17 (CALC) (ref 6–22)
CALCIUM SERPL-MCNC: 8.8 MG/DL (ref 8.6–10.4)
CHLORIDE SERPL-SCNC: 97 MMOL/L (ref 98–110)
CO2 SERPL-SCNC: 33 MMOL/L (ref 20–32)
CREAT SERPL-MCNC: 1.77 MG/DL (ref 0.6–0.95)
EGFRCR SERPLBLD CKD-EPI 2021: 29 ML/MIN/1.73M2
ERYTHROCYTE [DISTWIDTH] IN BLOOD BY AUTOMATED COUNT: 13.7 % (ref 11–15)
EST. AVERAGE GLUCOSE BLD GHB EST-MCNC: 166 MG/DL
EST. AVERAGE GLUCOSE BLD GHB EST-SCNC: 9.2 MMOL/L
GLUCOSE SERPL-MCNC: 121 MG/DL (ref 65–99)
HBA1C MFR BLD: 7.4 % OF TOTAL HGB
HCT VFR BLD AUTO: 41.9 % (ref 35–45)
HGB BLD-MCNC: 13.5 G/DL (ref 11.7–15.5)
MCH RBC QN AUTO: 32.9 PG (ref 27–33)
MCHC RBC AUTO-ENTMCNC: 32.2 G/DL (ref 32–36)
MCV RBC AUTO: 102.2 FL (ref 80–100)
PLATELET # BLD AUTO: 192 THOUSAND/UL (ref 140–400)
PMV BLD REES-ECKER: 9.1 FL (ref 7.5–12.5)
POTASSIUM SERPL-SCNC: 3.8 MMOL/L (ref 3.5–5.3)
RBC # BLD AUTO: 4.1 MILLION/UL (ref 3.8–5.1)
SODIUM SERPL-SCNC: 144 MMOL/L (ref 135–146)
WBC # BLD AUTO: 7.7 THOUSAND/UL (ref 3.8–10.8)

## 2025-02-04 ENCOUNTER — PATIENT OUTREACH (OUTPATIENT)
Dept: PRIMARY CARE | Facility: CLINIC | Age: 80
End: 2025-02-04
Payer: COMMERCIAL

## 2025-02-06 ENCOUNTER — HOSPITAL ENCOUNTER (OUTPATIENT)
Facility: HOSPITAL | Age: 80
Setting detail: OUTPATIENT SURGERY
Discharge: HOME | End: 2025-02-06
Attending: STUDENT IN AN ORGANIZED HEALTH CARE EDUCATION/TRAINING PROGRAM | Admitting: STUDENT IN AN ORGANIZED HEALTH CARE EDUCATION/TRAINING PROGRAM
Payer: COMMERCIAL

## 2025-02-06 VITALS
BODY MASS INDEX: 35.18 KG/M2 | TEMPERATURE: 97.9 F | RESPIRATION RATE: 15 BRPM | HEART RATE: 63 BPM | DIASTOLIC BLOOD PRESSURE: 81 MMHG | WEIGHT: 186.18 LBS | SYSTOLIC BLOOD PRESSURE: 156 MMHG | OXYGEN SATURATION: 100 %

## 2025-02-06 DIAGNOSIS — I50.9 DYSPNEA DUE TO CONGESTIVE HEART FAILURE: ICD-10-CM

## 2025-02-06 DIAGNOSIS — I48.0 PAROXYSMAL ATRIAL FIBRILLATION (MULTI): ICD-10-CM

## 2025-02-06 DIAGNOSIS — I27.20 PULMONARY HYPERTENSION, UNSPECIFIED (MULTI): ICD-10-CM

## 2025-02-06 DIAGNOSIS — Z95.0 PACEMAKER: Primary | ICD-10-CM

## 2025-02-06 DIAGNOSIS — R06.02 SHORTNESS OF BREATH: ICD-10-CM

## 2025-02-06 LAB — GLUCOSE BLD MANUAL STRIP-MCNC: 186 MG/DL (ref 74–99)

## 2025-02-06 PROCEDURE — C1887 CATHETER, GUIDING: HCPCS | Performed by: STUDENT IN AN ORGANIZED HEALTH CARE EDUCATION/TRAINING PROGRAM

## 2025-02-06 PROCEDURE — 2720000007 HC OR 272 NO HCPCS: Performed by: STUDENT IN AN ORGANIZED HEALTH CARE EDUCATION/TRAINING PROGRAM

## 2025-02-06 PROCEDURE — C1760 CLOSURE DEV, VASC: HCPCS | Performed by: STUDENT IN AN ORGANIZED HEALTH CARE EDUCATION/TRAINING PROGRAM

## 2025-02-06 PROCEDURE — 7100000010 HC PHASE TWO TIME - EACH INCREMENTAL 1 MINUTE: Performed by: STUDENT IN AN ORGANIZED HEALTH CARE EDUCATION/TRAINING PROGRAM

## 2025-02-06 PROCEDURE — 2500000004 HC RX 250 GENERAL PHARMACY W/ HCPCS (ALT 636 FOR OP/ED)

## 2025-02-06 PROCEDURE — 7100000009 HC PHASE TWO TIME - INITIAL BASE CHARGE: Performed by: STUDENT IN AN ORGANIZED HEALTH CARE EDUCATION/TRAINING PROGRAM

## 2025-02-06 PROCEDURE — 2500000004 HC RX 250 GENERAL PHARMACY W/ HCPCS (ALT 636 FOR OP/ED): Performed by: STUDENT IN AN ORGANIZED HEALTH CARE EDUCATION/TRAINING PROGRAM

## 2025-02-06 PROCEDURE — 2550000001 HC RX 255 CONTRASTS: Performed by: STUDENT IN AN ORGANIZED HEALTH CARE EDUCATION/TRAINING PROGRAM

## 2025-02-06 PROCEDURE — 2500000001 HC RX 250 WO HCPCS SELF ADMINISTERED DRUGS (ALT 637 FOR MEDICARE OP): Performed by: STUDENT IN AN ORGANIZED HEALTH CARE EDUCATION/TRAINING PROGRAM

## 2025-02-06 PROCEDURE — 99152 MOD SED SAME PHYS/QHP 5/>YRS: CPT | Performed by: STUDENT IN AN ORGANIZED HEALTH CARE EDUCATION/TRAINING PROGRAM

## 2025-02-06 PROCEDURE — 93453 R&L HRT CATH W/VENTRICLGRPHY: CPT | Performed by: STUDENT IN AN ORGANIZED HEALTH CARE EDUCATION/TRAINING PROGRAM

## 2025-02-06 PROCEDURE — C1894 INTRO/SHEATH, NON-LASER: HCPCS | Performed by: STUDENT IN AN ORGANIZED HEALTH CARE EDUCATION/TRAINING PROGRAM

## 2025-02-06 PROCEDURE — 2780000003 HC OR 278 NO HCPCS: Performed by: STUDENT IN AN ORGANIZED HEALTH CARE EDUCATION/TRAINING PROGRAM

## 2025-02-06 PROCEDURE — 82947 ASSAY GLUCOSE BLOOD QUANT: CPT

## 2025-02-06 PROCEDURE — 96373 THER/PROPH/DIAG INJ IA: CPT | Performed by: STUDENT IN AN ORGANIZED HEALTH CARE EDUCATION/TRAINING PROGRAM

## 2025-02-06 PROCEDURE — 93458 L HRT ARTERY/VENTRICLE ANGIO: CPT | Performed by: STUDENT IN AN ORGANIZED HEALTH CARE EDUCATION/TRAINING PROGRAM

## 2025-02-06 RX ORDER — FENTANYL CITRATE 50 UG/ML
INJECTION, SOLUTION INTRAMUSCULAR; INTRAVENOUS AS NEEDED
Status: DISCONTINUED | OUTPATIENT
Start: 2025-02-06 | End: 2025-02-06 | Stop reason: HOSPADM

## 2025-02-06 RX ORDER — HEPARIN SODIUM 1000 [USP'U]/ML
INJECTION, SOLUTION INTRAVENOUS; SUBCUTANEOUS AS NEEDED
Status: DISCONTINUED | OUTPATIENT
Start: 2025-02-06 | End: 2025-02-06 | Stop reason: HOSPADM

## 2025-02-06 RX ORDER — VERAPAMIL HYDROCHLORIDE 2.5 MG/ML
INJECTION, SOLUTION INTRAVENOUS AS NEEDED
Status: DISCONTINUED | OUTPATIENT
Start: 2025-02-06 | End: 2025-02-06 | Stop reason: HOSPADM

## 2025-02-06 RX ORDER — ASPIRIN 325 MG
325 TABLET, DELAYED RELEASE (ENTERIC COATED) ORAL ONCE
Status: DISCONTINUED | OUTPATIENT
Start: 2025-02-06 | End: 2025-02-06

## 2025-02-06 RX ORDER — NAPROXEN SODIUM 220 MG/1
324 TABLET, FILM COATED ORAL DAILY
Status: DISCONTINUED | OUTPATIENT
Start: 2025-02-06 | End: 2025-02-06 | Stop reason: HOSPADM

## 2025-02-06 RX ORDER — SODIUM CHLORIDE 9 MG/ML
255 INJECTION, SOLUTION INTRAVENOUS ONCE
Status: COMPLETED | OUTPATIENT
Start: 2025-02-06 | End: 2025-02-06

## 2025-02-06 RX ORDER — LIDOCAINE HYDROCHLORIDE 20 MG/ML
INJECTION, SOLUTION INFILTRATION; PERINEURAL AS NEEDED
Status: DISCONTINUED | OUTPATIENT
Start: 2025-02-06 | End: 2025-02-06 | Stop reason: HOSPADM

## 2025-02-06 RX ORDER — NITROGLYCERIN 40 MG/100ML
INJECTION INTRAVENOUS AS NEEDED
Status: DISCONTINUED | OUTPATIENT
Start: 2025-02-06 | End: 2025-02-06 | Stop reason: HOSPADM

## 2025-02-06 RX ORDER — NAPROXEN SODIUM 220 MG/1
81 TABLET, FILM COATED ORAL ONCE
Status: DISCONTINUED | OUTPATIENT
Start: 2025-02-06 | End: 2025-02-06 | Stop reason: HOSPADM

## 2025-02-06 RX ORDER — MIDAZOLAM HYDROCHLORIDE 1 MG/ML
INJECTION, SOLUTION INTRAMUSCULAR; INTRAVENOUS AS NEEDED
Status: DISCONTINUED | OUTPATIENT
Start: 2025-02-06 | End: 2025-02-06 | Stop reason: HOSPADM

## 2025-02-06 RX ADMIN — SODIUM CHLORIDE 255 ML/HR: 9 INJECTION, SOLUTION INTRAVENOUS at 08:05

## 2025-02-06 RX ADMIN — ASPIRIN 81 MG CHEWABLE TABLET 324 MG: 81 TABLET CHEWABLE at 08:37

## 2025-02-06 ASSESSMENT — PAIN SCALES - GENERAL

## 2025-02-06 NOTE — POST-PROCEDURE NOTE
Physician Transition of Care Summary  Invasive Cardiovascular Lab    Procedure Date: 2/6/2025  Attending:    * Benjamin Baker - Primary  Resident/Fellow/Other Assistant: Surgeons and Role:  * No surgeons found with a matching role *    Indications:   Pre-op Diagnosis      * Pacemaker [Z95.0]     * Shortness of breath [R06.02]     * Dyspnea due to congestive heart failure [I50.9]    Post-procedure diagnosis:   Post-op Diagnosis     * Pacemaker [Z95.0]     * Shortness of breath [R06.02]     * Dyspnea due to congestive heart failure [I50.9]    Procedure(s):   Left And Right Heart Cath, With LV  98635 - IL R & L HRT CATH WINJX HRT ART& L VENTR IMG    Procedure Findings:   Right coronary artery system dominance  Normal coronaries with tortuosity  Preserved LV systolic function    Description of the Procedure:   Procedure: Right and Left Heart Catheterization    Right Cephalic brachial vein access with 5F sheath using microcatheter under ultrasound guidance. 5F balloon-tip Arrow catheter inserted and advanced, however did not go through the subclavian region due to vessel stenosis and interaction with the pacemaker electrodes. To avoid damaging it, we opted not to pursue pulmonary artery pressures. Catheter removed.    R radial artery access with 6F sheath. LV and Ao hemodynamic measurements. S/P LHC that showed normal coronaries with tortuosity. Preserved LV systolic function. Patient remained stable during the entire procedure. No complications. Hemostasis with TR Band.    Plan: Patient may be discharged home after bed rest for 3 hours. Constant check for bleeding. Maintain compression band (CB) for 60 minutes past procedure. Remove 3mL of air from CB every 15 minutes until fully deflated. If recurrent bleeding occurs, re-inflate with enough air to fully restore hemostasis (2 to 3 mL, maximum of 18 mL). Maintain CB at this same level for 30 minutes before attempting to re-deflate. Once fully deflated,  carefully remove CB and place a sterile dressing over access site. Observe for 15 minutes and check for pulse and for signs of bleeding. Instruct patient not to use or bend their wrist for four hours.    Cardiology follow up. Would suggest to keep conservative treatment and guideline-directed medical therapy (GDMT). Follow up with Structural Heart Team for Left Atrial Appendage Closure (LAAC) - Watchman procedure.    Complications:   No    Stents/Implants:   Implants       No implant documentation for this case.          Anticoagulation/Antiplatelet Plan:   Eliquis    Estimated Blood Loss:   * No values recorded between 2/6/2025  9:07 AM and 2/6/2025  9:51 AM *    Anesthesia: Moderate Sedation Anesthesia Staff: No anesthesia staff entered.    Any Specimen(s) Removed:   No specimens collected during this procedure.    Disposition:   Home    Electronically signed by: Benjamin Baker MD, 2/6/2025 9:51 AM

## 2025-02-06 NOTE — Clinical Note
Patient Clipped and Prepped: groin, right radial and right brachial. Prepped with ChloraPrep, a minimum of 3 minute dry time, longer if needed, no pooling noted, patient draped in sterile fashion and Betadine and draped in sterile fashion.

## 2025-02-06 NOTE — NURSING NOTE
Patient discharged via wheelchair to private vehicle.    Home going instructions specific to procedure given: Yes    1 Easily Arousable; Responding Appropriately: Yes    2. VS +/- 20% of preprocedure status: Yes     3. Significant complications are absent: Yes    4. Ambulates without dizziness/Age appropriate activity: Yes    5. Pulse Ox great than or equal to 92% or above on room air /ordered oxygen treatment: Yes    6. Care Plan Complete: Yes    7. Discharge education completed and information provided to patient and family: Yes    8. If Patient had PCI performed-Stent card sent home with Patient: N/A    Patient and family have no further questions at this time. Gave patient and family department number and office number if any questions should arise.       Benzoyl Peroxide Counseling: Patient counseled that medicine may cause skin irritation and bleach clothing.  In the event of skin irritation, the patient was advised to reduce the amount of the drug applied or use it less frequently.   The patient verbalized understanding of the proper use and possible adverse effects of benzoyl peroxide.  All of the patient's questions and concerns were addressed.

## 2025-02-06 NOTE — DISCHARGE INSTRUCTIONS
CARDIAC CATHETERIZATION DISCHARGE INSTRUCTIONS     FOR SUDDEN AND SEVERE CHEST PAIN, SHORTNESS OF BREATH, EXCESSIVE BLEEDING, SIGNS OF STROKE, OR CHANGES IN MENTAL STATUS YOU SHOULD CALL 911 IMMEDIATELY.     If your provider has prescribed aspirin and/or clopidogrel (Plavix), or prasugrel (Effient), or ticagrelor (Brilinta), DO NOT STOP THESE MEDICATIONS for any reason without talking to your cardiologist first. If any of these were prescribed, you must take them every day without missing a single dose. If you are getting low on these medications, contact your provider immediately for a refill.     FOR NEXT 24 HOURS  - Upon discharge, you should return home and rest for the remainder of the day and evening. You do not have to stay on bed rest but should not be very active.  It is recommended a responsible adult be with you for the first 24 hours after the procedure.    - No driving for 24 hours after procedure. Please arrange for someone to drive you home from the hospital today.     - Do not drive, operate machinery, or use power tools for 24 hours after your procedure.     - Do not make any legal decisions for 24 hours after your procedure.     - Do not drink alcoholic beverages for 24 hours after your procedure.    Resume Eliquis on Saturday, February 8th 2025    WOUND CARE   *FOR FEMORAL (LEG) ACCESS*  ·      Avoid heavy lifting (over 10 pounds) for 7 days, squatting or excessive bending for 2 days, and strenuous exercise for 7 days.  ·      No submerged bathing, swimming, or hot tubs for the next 7 days, or until fully healed.  ·      Avoid sexual activity for 3-4 days until any groin discomfort has ceased.     *FOR RADIAL (WRIST) ACCESS*  ·      No lifting more than 5 pounds or excessive use of the wrist for 24 hours - for example, treat your wrist as if it is sprained.  ·      Do not engage in vigorous activities (tennis, golf, bowling, weights) for at least 48 hours after the procedure.  ·      Do  not submerge the wrist for 7 days after the procedure.  ·      You should expect mild tingling in your hand and tenderness at the puncture site for up to 3 days.    - The transparent dressing should be removed from the site 24 hours after the procedure.  Wash the site gently with soap and water. Rinse well and pat dry. Keep the area clean and dry. You may apply a Band-Aid to the site. Avoid lotions, ointments, or powders until fully healed.     - You may shower the day after your procedure.      - It is normal to notice a small bruise around the puncture site and/or a small grape sized or smaller lump. Any large bruising or large lump warrants a call to the office.     - If bleeding should occur, lay down and apply pressure to the affected area for 10 minutes.  If the bleeding stops notify your physician.  If there is a large amount of bleeding or spurting of blood CALL 911 immediately.  DO NOT drive yourself to the hospital.    - You may experience some tenderness, bruising or minimal inflammation.  If you have any concerns, you may contact the Cath Lab or if any of these symptoms become excessive, contact your cardiologist or go to the emergency room.     OTHER INSTRUCTIONS  - You may take acetaminophen (Tylenol) as directed for discomfort.  If pain is not relieved with acetaminophen (Tylenol), contact your doctor.    - If you notice or experience any of the following, you should notify your doctor or seek medical attention  Chest pain or discomfort  Change in mental status or weakness in extremities.  Dizziness, light headedness, or feeling faint.  Change in the site where the procedure was performed, such as bleeding or an increased area of bruising or swelling.  Tingling, numbness, pain, or coolness in the leg/arm beyond the site where the procedure was performed.  Signs of infection (i.e. shaking chills, temperature > 100 degrees Fahrenheit, warmth, redness) in the leg/arm area where the procedure was  performed.  Changes in urination   Bloody or black stools  Vomiting blood  Severe nose bleeds  Any excessive bleeding    - If you DO NOT have an appointment with your cardiologist within 2-4 weeks following your procedure, please contact their office.      Lifestyle Recommendations for a Healthier Life:    The following lifestyle changes can have a significant positive impact on your overall health - helping you to achieve healthy weight, lower metabolic and heart disease risk and manage stress more effectively:      1. Eat whole, fresh foods. Food is one of the biggest drivers of our overall health.  Make your meals whole foods based, focusing on a wide variety of non starchy vegetables and fruits.  It also beneficial to include various nuts, seeds and high-quality animal proteins for overall balanced diet.    2. Remove the sweeteners from your diet.  Artificial sweeteners have a negative impact on our metabolic health - they can cause increase in both glucose and insulin, increasing the risk or potential for insulin resistance and abnormal blood sugar levels.  Artificial sweeteners are also excessively sweeter than regular sugar, they trick our taste buds into craving extreme forms of sweet, like an addiction.  I encourage you to avoid sugar, but also stevia, aspartame, sucralose, sugar alcohols like xylitol and maltitol.    3. Get rid of the inflammatory factors in your diet - this mainly comes from sugars of all kinds and refined vegetable oils.  These can increase our risk for changes in our metabolic health which can lead to diabetes, heart disease and other chronic disease.  You can reverse or combat the effective of inflammatory foods by increasing your intake of anti-inflammatory foods like wild-caught fish, fresh ground flax seed, fish oil and variety of fruits & vegetables.      4. Eat plenty of fiber!!  The standard American diet has very low levels of daily dietary fiber, many people barely get 15 grams  per day.  There is plenty of nutrition research that shows how high-fiber foods can help lower our blood sugar.   Eat a wide variety of fiber-rich plant-based foods including nuts, seeds, fruits, vegetables, and legumes.    5. Get enough sleep. Studies from experts in endocrinology & metabolism have shown that even a few nights of poor sleep can increase the risk of insulin resistance and abnormal blood sugar values. Make sleep a priority - it is an important factor in your health.  Develop a sleep routine including: avoid eating two-three hours before bed, practice relaxation techniques (warm bath, meditation, yoga, mindfulness) to help relax your muscles and prepare you for sleep.    Go to bed and wake up at consistent times.  Avoid screen time for at least 60-90 minutes before bedtime.    6. Make exercise part of your regular routine.  Exercise helps us manage blood sugar more effectively and makes our cells more receptive to the effect of the insulin our body makes (lowers blood sugar).  Regular aerobic exercise AND interval or strength training are ideal to help maintain a healthy weight, metabolism & lower the risk of chronic disease.      7. Control stress levels. Chronic stress can lead to elevated blood sugar, elevated blood pressure, accumulation of fat around the belly and these things increase the risk for metabolic syndrome, diabetes and heart disease.  We all have various levels of stress in our lives, we can't control all of it, but we can control how we respond to it and manage it's impact.  Find healthy outlets for stress management like meditation, yoga, deep breathing, or exercise.    Home BP monitoring instructions:   Goal < 130 / 80   Remain still, Avoid smoking, caffeinated beverages, or exercise within 30 min before BP measurements.  Ensure 5 min of quiet rest before BP measurements.  Sit correctly with back straight and supported (on a straight-backed dining chair, for example, rather than a  sofa).  Sit with feet flat on the floor and legs uncrossed.  Keep arm supported on a flat surface (such as a table), with the upper arm at heart level.  Bottom of the cuff should be placed directly above the bend of the elbow  Take a reading in the AM before breakfast 2-3 times / week   Record all readings accurately:  A written log should be brought to all appointments   Monitors with built-in memory should be brought to all clinic appointments and calibrated to our machines      Weight Management:  Since food equals calories, in order to lose weight you must either eat fewer calories, exercise more to burn off calories with activity, or both. Food that is not used to fuel the body is stored as fat.    A major component of losing weight is to make smarter food choices. Here's how:    Limit non-nutritious foods, such as:  Sugar, honey, syrups and candy  Pastries, donuts, pies, cakes and cookies  Soft drinks, sweetened juices and alcoholic beverages    Cut down on high-fat foods by:  Choosing poultry, fish or lean red meat  Choosing low-fat cooking methods, such as baking, broiling, steaming, grilling and boiling  Using low-fat or non-fat dairy products  Using vinaigrette, herbs, lemon or fat-free salad dressings  Avoiding fatty meats, such as hernandez, sausage, gupta, ribs and luncheon meats  Avoiding high-fat snacks like nuts, chips and chocolate  Avoiding fried foods  Using less butter, margarine, oil and mayonnaise  Avoiding high-fat gravies, cream sauces and cream-based soups    Eat a variety of foods, including:  Fruit and vegetables that are raw, steamed or baked  Whole grains, breads, cereal, rice and pasta  Dairy products, such as low-fat or non-fat milk or yogurt, low-fat cottage cheese and low-fat cheese  Protein-rich foods like chicken, turkey, fish, lean meat and legumes, or beans    Change your eating habits:  Eat three balanced meals a day to help control your hunger  Watch portion sizes and eat small  servings of a variety of foods  Choose low-calorie snacks  Eat only when you are hungry and stop when you are satisfied  Eat slowly and try not to perform other tasks while eating  Find other activities to distract you from food, such as walking, taking up a hobby or being involved in the community  Include regular exercise in your daily routine  Find a support group, if necessary, for emotional support in your weight loss effort    Patient Education: Smoking Cessation  Making the commitment to quit smoking is one of the best choices that smokers can make for themselves, but also a difficult one. There are various opportunities for support available. Here is an overview of them.  There are various forms of nicotine replacement therapy available to assist with minimizing these symptoms. They are nicotine gum, nicotine patch, nicotine nasal spray, nicotine inhaler, and nicotine lozenge.  Which kind of nicotine replacement therapy will best work for you can be discussed with your doctor or nurse to help you understand the pros and cons of each.  Non-nicotine replacement therapy is also available. Bupropion (Wellbutrin, Zyban) is a mild anti-depressant that is also effective in helping people to quit smoking. It can be used alone or with nicotine replacement therapy.   Varenicline (Chantix) is another medication that helps people who what to quit. This medication is not a form of nicotine replacement therapy, but it helps with the withdrawal symptoms.  Studies have shown that the best success rates for people trying to quit include counseling and/or support groups such as the [employer] that is a free, confidential, 24/7, 365-day-a-year treatment referral and information service:  8-410-089-HELP (2537)    Some helpful hints include:  Avoidance. Stay away from smokers and places where you are tempted to smoke.  Activities. Exercise or do hobbies that keep your hands busy.  Alternatives. Use oral substitutes such  as sugarless gum, hard candy, and carrot sticks.  Change of habits. For example, if you usually smoke during a coffee break, then go for a walk instead.  Deep breathing. When you have the urge to smoke, do a deep breathing exercise and remind yourself why you quit.  Delay tactic. If you feel that you are about to light up, delay. Tell yourself you must wait 10 minutes. Often this simple trick will allow you to move beyond the urge  Discussion. Call a friend for support.  Drink of water. Use as an oral substitute such as water.  Many people say the reason they smoke is to deal with stress. Unfortunately, stress is a part of all our lives. When quitting, you will need to find new strategies to deal with stress. There are stress-management classes, and self-help books that can help you discover new ways to reduce and deal with stress.  The bottom line is: don't just try to go it alone-reach out for support to help you achieve your goal.

## 2025-02-07 ENCOUNTER — APPOINTMENT (OUTPATIENT)
Dept: PRIMARY CARE | Facility: CLINIC | Age: 80
End: 2025-02-07
Payer: COMMERCIAL

## 2025-02-13 ENCOUNTER — APPOINTMENT (OUTPATIENT)
Dept: CARDIOLOGY | Facility: CLINIC | Age: 80
End: 2025-02-13
Payer: COMMERCIAL

## 2025-02-13 ENCOUNTER — HOSPITAL ENCOUNTER (OUTPATIENT)
Dept: CARDIOLOGY | Facility: CLINIC | Age: 80
Discharge: HOME | End: 2025-02-13
Payer: COMMERCIAL

## 2025-02-13 DIAGNOSIS — I44.2 ATRIOVENTRICULAR BLOCK, COMPLETE (MULTI): ICD-10-CM

## 2025-02-13 DIAGNOSIS — Z95.0 PACEMAKER: ICD-10-CM

## 2025-02-13 PROCEDURE — 93296 REM INTERROG EVL PM/IDS: CPT

## 2025-02-14 ENCOUNTER — OFFICE VISIT (OUTPATIENT)
Dept: CARDIOLOGY | Facility: CLINIC | Age: 80
End: 2025-02-14
Payer: COMMERCIAL

## 2025-02-14 VITALS
DIASTOLIC BLOOD PRESSURE: 74 MMHG | HEIGHT: 61 IN | BODY MASS INDEX: 34.93 KG/M2 | HEART RATE: 90 BPM | OXYGEN SATURATION: 96 % | WEIGHT: 185 LBS | SYSTOLIC BLOOD PRESSURE: 148 MMHG

## 2025-02-14 DIAGNOSIS — E78.1 HYPERTRIGLYCERIDEMIA: ICD-10-CM

## 2025-02-14 DIAGNOSIS — I50.9 DYSPNEA DUE TO CONGESTIVE HEART FAILURE: ICD-10-CM

## 2025-02-14 DIAGNOSIS — I10 PRIMARY HYPERTENSION: ICD-10-CM

## 2025-02-14 DIAGNOSIS — I50.32 CHRONIC DIASTOLIC HEART FAILURE: ICD-10-CM

## 2025-02-14 DIAGNOSIS — I27.20 PULMONARY HYPERTENSION, UNSPECIFIED (MULTI): ICD-10-CM

## 2025-02-14 DIAGNOSIS — Z95.0 PACEMAKER: Primary | ICD-10-CM

## 2025-02-14 DIAGNOSIS — I44.2 ATRIOVENTRICULAR BLOCK, COMPLETE (MULTI): ICD-10-CM

## 2025-02-14 DIAGNOSIS — R06.02 SHORTNESS OF BREATH: ICD-10-CM

## 2025-02-14 PROCEDURE — 1036F TOBACCO NON-USER: CPT | Performed by: STUDENT IN AN ORGANIZED HEALTH CARE EDUCATION/TRAINING PROGRAM

## 2025-02-14 PROCEDURE — 1160F RVW MEDS BY RX/DR IN RCRD: CPT | Performed by: STUDENT IN AN ORGANIZED HEALTH CARE EDUCATION/TRAINING PROGRAM

## 2025-02-14 PROCEDURE — 3077F SYST BP >= 140 MM HG: CPT | Performed by: STUDENT IN AN ORGANIZED HEALTH CARE EDUCATION/TRAINING PROGRAM

## 2025-02-14 PROCEDURE — 1157F ADVNC CARE PLAN IN RCRD: CPT | Performed by: STUDENT IN AN ORGANIZED HEALTH CARE EDUCATION/TRAINING PROGRAM

## 2025-02-14 PROCEDURE — 99214 OFFICE O/P EST MOD 30 MIN: CPT | Performed by: STUDENT IN AN ORGANIZED HEALTH CARE EDUCATION/TRAINING PROGRAM

## 2025-02-14 PROCEDURE — 93000 ELECTROCARDIOGRAM COMPLETE: CPT | Performed by: STUDENT IN AN ORGANIZED HEALTH CARE EDUCATION/TRAINING PROGRAM

## 2025-02-14 PROCEDURE — 1159F MED LIST DOCD IN RCRD: CPT | Performed by: STUDENT IN AN ORGANIZED HEALTH CARE EDUCATION/TRAINING PROGRAM

## 2025-02-14 PROCEDURE — 3078F DIAST BP <80 MM HG: CPT | Performed by: STUDENT IN AN ORGANIZED HEALTH CARE EDUCATION/TRAINING PROGRAM

## 2025-02-14 NOTE — PROGRESS NOTES
Chief Complaint   Patient presents with    Follow-up     L / R HEART CATH 1/14/25  EKG- 2/14/25     HPI:  I was requested by Dr. Galicia to evaluate this patient in consultation for cardiac assessment.     Patient 79-year-old female with a medical history of S/P PPM (11/2023), hypertension, hyperlipidemia, diabetes, atrial fibrillation on Eliquis who was admitted and seen by me at [facility] on 5/17/2022 for runs of atrial fibrillation with ambulation.      Problem #1 paroxysmal atrial fibrillation  - On Eliquis 5 mg twice daily.  -She denies any symptoms. She denies any palpitations/shortness of breath with exertion/chest pain/orthopnea PND/lower extremity edema. Also says no to dizziness/lightheadedness/syncopal episodes.  -No bleeding complications on the Eliquis.     Problem #2 hypertension  -Currently on lisinopril 40 mg daily and amlodipine 5 mg daily  -Normotensive in clinic today     Problem #3 diabetes concurrent with hyperlipidemia  -On lovastatin 20 mg LDL was 51 mg/dL in 2022.      Problem #4 S/P PPM (11/29/2023)  - Prior PPM placement  - EKG on Afib      Patient returned previously felling well. However, she states that she increases her weight weekly, and if improves with additional dose of Bumex. She is also non-compliant with salt restriction - for example, diet rich in sausages.      Patient reports that she has been admitted in December 2024 in another hospital due to GI bleeding - hemorrhoids. She is back to [facility]. She will be schedule for Left Atrial Appendage Closure (LAAC) in [place].     Left Heart Catheterization (01/10/2025): Normal coronaries, preserved LV systolic function.    Past Medical History  Past Medical History:   Diagnosis Date    Encounter for full-term uncomplicated delivery     Normal vaginal delivery    Encounter for gynecological examination (general) (routine) without abnormal findings 09/01/2020    Encounter for routine gynecological examination     Other conditions influencing health status     Menstruation    Other fecal abnormalities     Stool guaiac positive    Personal history of other diseases of the circulatory system     History of CHF (congestive heart failure)    Personal history of other medical treatment 10/06/2021    H/O mammogram       Past Surgical History  Past Surgical History:   Procedure Laterality Date    CARDIAC CATHETERIZATION N/A 2/6/2025    Procedure: Left And Right Heart Cath, With LV;  Surgeon: Benjamin Baker MD;  Location: [facility] Cardiac Cath Lab;  Service: Cardiovascular;  Laterality: N/A;  8:30. CKD    CT ABDOMEN PELVIS ANGIOGRAM W AND/OR WO IV CONTRAST  07/21/2023    CT ABDOMEN PELVIS ANGIOGRAM W AND/OR WO IV CONTRAST 7/21/2023 [facility] CT    INSERT / REPLACE / REMOVE PACEMAKER      OTHER SURGICAL HISTORY  10/17/2019    Throat surgery    OTHER SURGICAL HISTORY  10/17/2019    Cholecystectomy    OTHER SURGICAL HISTORY  12/12/2019    Shoulder surgery    OTHER SURGICAL HISTORY  02/19/2020    Knee surgery    OTHER SURGICAL HISTORY  09/22/2020    Knee replacement    OTHER SURGICAL HISTORY  12/12/2019    Carpal tunnel surgery    OTHER SURGICAL HISTORY  12/12/2019    Thyroidectomy    OTHER SURGICAL HISTORY  12/12/2019    Cataract surgery       Past Family History  Family History   Problem Relation Name Age of Onset    Diabetes Mother      Hypertension Mother      Diabetes Father      Hypertension Father         Allergy History  Allergies   Allergen Reactions    Fenofibrate Hives    Oxycodone-Acetaminophen Unknown     vomitting    Aspirin Rash    Metformin Rash       Past Social History  Social History     Socioeconomic History    Marital status:    Tobacco Use    Smoking status: Never    Smokeless tobacco: Never   Vaping Use    Vaping status: Never Used   Substance and Sexual Activity    Alcohol use: Never    Drug use: Never    Sexual activity: Defer     Social Drivers of Health     Food Insecurity: No Food Insecurity  "(12/12/2024)    Received from [facility]    Hunger Vital Sign     Worried About Running Out of Food in the Last Year: Never true     Ran Out of Food in the Last Year: Never true   Transportation Needs: No Transportation Needs (12/12/2024)    Received from [facility]    PRAPARE - Transportation     Lack of Transportation (Medical): No     Lack of Transportation (Non-Medical): No   Intimate Partner Violence: Not At Risk (12/12/2024)    Received from [facility]    Humiliation, Afraid, Rape, and Kick questionnaire     Fear of Current or Ex-Partner: No     Emotionally Abused: No     Physically Abused: No     Sexually Abused: No   Housing Stability: Low Risk  (12/12/2024)    Received from [facility]    Housing Stability Vital Sign     Unable to Pay for Housing in the Last Year: No     Number of Times Moved in the Last Year: 0     Homeless in the Last Year: No       Social History     Tobacco Use   Smoking Status Never   Smokeless Tobacco Never       Objective Data:  Last Recorded Vitals:  Vitals:    02/14/25 1139   BP: 148/74   BP Location: Right arm   Pulse: 90   SpO2: 96%   Weight: 83.9 kg (185 lb)   Height: 1.549 m (5' 1\")       Last Labs:  CBC - 1/31/2025:  7:05 AM  7.7 13.5 192    41.9      CMP - 1/31/2025:  7:05 AM  8.8 7.6 24 --- 1.0   _ 4.2 13 222      PTT - 12/11/2024:  6:58 PM  1.5   17.0 41     TROPHS   Date/Time Value Ref Range Status   07/21/2023 11:20 AM 26 0 - 13 ng/L Final     Comment:     .  Less than 99th percentile of normal range cutoff-  Female and children under 18 years old <14 ng/L; Male <21 ng/L: Negative  Repeat testing should be performed if clinically indicated.   .  Female and children under 18 years old 14-50 ng/L; Male 21-50 ng/L:  Consistent with possible cardiac damage and possible increased clinical   risk. Serial measurements may help to assess extent of myocardial damage.   .  >50 ng/L: Consistent with cardiac damage, increased clinical risk and  myocardial infarction. Serial " measurements may help assess extent of   myocardial damage.   .   NOTE: Children less than 1 year old may have higher baseline troponin   levels and results should be interpreted in conjunction with the overall   clinical context.   .  NOTE: Troponin I testing is performed using a different   testing methodology at [facility] than at other   [facility]. Direct result comparisons should only   be made within the same method.       BNP   Date/Time Value Ref Range Status   09/06/2024 07:29  0 - 99 pg/mL Final   06/21/2024 09:15  0 - 99 pg/mL Final     HGBA1C   Date/Time Value Ref Range Status   01/31/2025 07:05 AM 7.4 <5.7 % of total Hgb Final     Comment:     For someone without known diabetes, a hemoglobin A1c  value of 6.5% or greater indicates that they may have   diabetes and this should be confirmed with a follow-up   test.     For someone with known diabetes, a value <7% indicates   that their diabetes is well controlled and a value   greater than or equal to 7% indicates suboptimal   control. A1c targets should be individualized based on   duration of diabetes, age, comorbid conditions, and   other considerations.     Currently, no consensus exists regarding use of  hemoglobin A1c for diagnosis of diabetes for children.         08/01/2024 07:23 AM 7.9 see below % Final   05/01/2024 11:53 AM 8.1 see below % Final     LDLCALC   Date/Time Value Ref Range Status   05/01/2024 11:53 AM 39 <=99 mg/dL Final     Comment:                                 Near   Borderline      AGE      Desirable  Optimal    High     High     Very High     0-19 Y     0 - 109     ---    110-129   >/= 130     ----    20-24 Y     0 - 119     ---    120-159   >/= 160     ----      >24 Y     0 -  99   100-129  130-159   160-189     >/=190       VLDL   Date/Time Value Ref Range Status   05/01/2024 11:53 AM 66 0 - 40 mg/dL Final   06/23/2023 07:25 AM 22 0 - 40 mg/dL Final   07/20/2022 07:25 AM 25 0 - 40 mg/dL Final  "  06/01/2021 12:05 PM 57 0 - 40 mg/dL Final        Patient Medications:  Outpatient Encounter Medications as of 2/14/2025   Medication Sig Dispense Refill    acetaminophen 325 mg chewable tablet Chew if needed.      amiodarone (Pacerone) 200 mg tablet Take 1 tablet (200 mg) by mouth once daily. 30 tablet 5    apixaban (Eliquis) 5 mg tablet Take 1 tablet (5 mg) by mouth 2 times a day. Do not fill before February 8, 2025.      BD Ultra-Fine Micro Pen Needle 32 gauge x 1/4\" needle Up to three times daily 100 each 3    bumetanide (Bumex) 2 mg tablet Take 1 tablet (2 mg) by mouth once daily. Take a second tablet if your weight goes up by 2 pounds. 90 tablet 3    calcium polycarbophiL (Fibercon) 625 mg tablet Take 1 tablet (625 mg) by mouth once daily.      dapagliflozin propanediol (Farxiga) 10 mg Take 1 tablet (10 mg) by mouth once every 24 hours. 90 tablet 3    dextran 70-hypromellose (Bion Tears) 0.1-0.3 % ophthalmic solution Administer 1 drop into both eyes 3 times a day as needed for dry eyes. 15 mL 11    flash glucose scanning reader (FreeStyle Alannah 2 [place]) misc Use as instructed 1 each 1    flash glucose sensor kit (FreeStyle Alannah 2 Sensor) kit Use as instructed 1 each 3    insulin lispro protamin-lispro (HumaLOG Mix 75-25 KwikPen) 100 unit/mL (75-25) injection INJECT 15 UNITS SUBCUTANEOUSLY IN THE MORNING and 15 UNITS NIGHTLY 9 mL 3    levothyroxine (Synthroid, Levoxyl) 125 mcg tablet Take 1 tablet (125 mcg) by mouth early in the morning.. Take on an empty stomach at the same time each day, either 30 to 60 minutes prior to breakfast 30 tablet 11    lovastatin (Mevacor) 20 mg tablet Take 1 tablet (20 mg) by mouth once daily at bedtime. 90 tablet 3    metoprolol tartrate (Lopressor) 50 mg tablet Take 1 tablet by mouth 2 times a day. 60 tablet 5    potassium chloride CR 20 mEq ER tablet Take 1 tablet (20 mEq) by mouth 3 times a day. 60 tablet 11    aspirin 81 mg EC tablet Take 1 tablet (81 mg) by mouth once " daily. (Patient not taking: Reported on 2/14/2025)      lisinopril 40 mg tablet Take 1 tablet (40 mg) by mouth once daily.      loratadine (Claritin) 10 mg tablet Take 1 tablet (10 mg) by mouth 1 time if needed.      pantoprazole (ProtoNix) 40 mg EC tablet Take 1 tablet (40 mg) by mouth once daily in the morning. Take before meals. Do not crush, chew, or split.      sennosides-docusate sodium (Melissa-Colace) 8.6-50 mg tablet Take 1 tablet by mouth if needed for constipation.       No facility-administered encounter medications on file as of 2/14/2025.       Physical Exam:  General: alert, oriented and in no acute distress  HEENT: NC/AT; EOMI; PERRLA, external ear is normal  Neck: supple; trachea midline; no masses; no JVD  Chest: diminished breath sounds bilaterally; on NC oxygen  Cardio: regular rhythm, S1S2 normal, no murmurs; Pacemaker  Abdomen: Soft, non-tender, non-distension, no organomegaly  Extremities: Edema 1+/3+ LE b/l  Neuro: Grossly intact     Psychiatric: Normal mood and affect    Past Cardiology Results (Last 3 Years):  EKG:  ECG 12 lead (Clinic Performed) 02/14/2025      ECG 12 lead (Clinic Performed) 01/10/2025      ECG 12 lead (Ancillary Performed) 11/15/2024      ECG 12 lead (Clinic Performed) 08/27/2024      ECG 12 lead (Clinic Performed) 12/04/2023    Echo:  Echo Results:  Transthoracic Echo (TTE) Complete 02/23/2024    [place] [address]  Phone [phone], Fax [phone]    TRANSTHORACIC ECHOCARDIOGRAM REPORT      Patient Name:      OG CHICAS CARLOS Hernandez Physician:    [id number] Adolph Paul MD  Study Date:        2/23/2024            Ordering Provider:    [id number] JENNIFER SELLERS  MRN/PID:           [id number]             Fellow:  Accession#:        [id number]         Nurse:                Stacia Jensen RN  Date of Birth/Age: 1945 / 78 years  Sonographer:          Quique Amado RDCS  Gender:            F                     Additional Staff:  Height:            154.94 cm            Admit Date:  Weight:            92.53 kg             Admission Status:     Outpatient  BSA / BMI:         1.91 m2 / 38.55      Department Location:  [facility] Echo Lab  kg/m2  Blood Pressure: 82 /52 mmHg    Study Type:    TRANSTHORACIC ECHO (TTE) COMPLETE  Diagnosis/ICD: Unspecified atrial fibrillation-I48.91  CPT Codes:     Echo Complete w Full Doppler-[id number]  Study Detail: The following Echo studies were performed: 2D, M-Mode, Doppler and  color flow. Definity used as a contrast agent for endocardial  border definition. Total contrast used for this procedure was  2.00cc mL via IV push.      PHYSICIAN INTERPRETATION:  Left Ventricle: Left ventricular systolic function is normal, with an estimated ejection fraction of 55%. There are no regional wall motion abnormalities. The left ventricular cavity size is normal. Left ventricular diastolic filling was indeterminate.  Left Atrium: The left atrium is mildly dilated.  Right Ventricle: The right ventricle is normal in size. There is reduced right ventricular systolic function.  Right Atrium: The right atrium is normal in size.  Aortic Valve: The aortic valve was not well visualized. There is no evidence of aortic valve regurgitation. The peak instantaneous gradient of the aortic valve is 5.0 mmHg. The mean gradient of the aortic valve is 3.0 mmHg.  Mitral Valve: The mitral valve is normal in structure. There is mild mitral valve regurgitation.  Tricuspid Valve: The tricuspid valve is structurally normal. Tricuspid regurgitation was not assessed.  Pulmonic Valve: The pulmonic valve is not well visualized. There is moderate pulmonic valve regurgitation.  Pericardium: There is no pericardial effusion noted.  Aorta: The aortic root is normal.  Systemic Veins: The inferior vena cava appears to be of normal size. There is IVC inspiratory collapse greater than 50%.      CONCLUSIONS:  1. Left ventricular systolic  function is normal with a 55% estimated ejection fraction.  2. There is reduced right ventricular systolic function.  3. Moderate pulmonic valve regurgitation.  4. Calculated pulmonary artery systolic pressure is 46 mmHg suggestive of pulmonary hypertension.  5. Poorly visualized anatomical structures due to suboptimal image quality.    QUANTITATIVE DATA SUMMARY:  2D MEASUREMENTS:  Normal Ranges:  Ao Root d:     2.60 cm    (2.0-3.7cm)  LAs:           4.60 cm    (2.7-4.0cm)  IVSd:          1.31 cm    (0.6-1.1cm)  LVPWd:         1.00 cm    (0.6-1.1cm)  LVIDd:         5.01 cm    (3.9-5.9cm)  LVIDs:         3.96 cm  LV Mass Index: 116.6 g/m2  LV % FS        21.0 %    LA VOLUME:  Normal Ranges:  LA Vol A4C:        24.6 ml    (22+/-6mL/m2)  LA Vol A2C:        35.9 ml  LA Vol BP:         30.3 ml  LA Vol Index A4C:  12.9ml/m2  LA Vol Index A2C:  18.9 ml/m2  LA Vol Index BP:   15.9 ml/m2  LA Area A4C:       11.9 cm2  LA Area A2C:       14.7 cm2  LA Major Axis A4C: 4.9 cm  LA Major Axis A2C: 5.1 cm  LA Volume Index:   12.6 ml/m2  LA Vol A4C:        24.0 ml  LA Vol A2C:        36.0 ml    LV SYSTOLIC FUNCTION BY 2D PLANIMETRY (MOD):  Normal Ranges:  EF-A4C View: 53.8 % (>=55%)  EF-A2C View: 54.1 %  EF-Biplane:  52.3 %    LV DIASTOLIC FUNCTION:  Normal Ranges:  MV Peak E:    1.13 m/s (0.7-1.2 m/s)  MV Peak A:    0.52 m/s (0.42-0.7 m/s)  E/A Ratio:    2.17     (1.0-2.2)  MV lateral e' 0.09 m/s  MV medial e'  0.07 m/s    MITRAL VALVE:  Normal Ranges:  MV DT: 211 msec (150-240msec)    AORTIC VALVE:  Normal Ranges:  AoV Vmax:                1.12 m/s (<=1.7m/s)  AoV Peak P.0 mmHg (<20mmHg)  AoV Mean PG:             3.0 mmHg (1.7-11.5mmHg)  LVOT Max Dwayne:            0.89 m/s (<=1.1m/s)  AoV VTI:                 29.50 cm (18-25cm)  LVOT VTI:                15.80 cm  LVOT Diameter:           1.50 cm  (1.8-2.4cm)  AoV Area, VTI:           0.95 cm2 (2.5-5.5cm2)  AoV Area,Vmax:           1.40 cm2 (2.5-4.5cm2)  AoV  Dimensionless Index: 0.54      RIGHT VENTRICLE:  TAPSE: 15.2 mm  RV s'  0.07 m/s    TRICUSPID VALVE/RVSP:  Normal Ranges:  Peak TR Velocity: 3.68 m/s  RV Syst Pressure: 57.2 mmHg (< 30mmHg)    PULMONIC VALVE:  Normal Ranges:  PV Accel Time: 81 msec  (>120ms)  PIEDV:         1.19 m/s  PADP:          8.7 mmHg      [id number] Adolph Paul MD  Electronically signed on 2/23/2024 at 12:55:07 PM        ** Final **     Cath:  Cardiac Catheterization Procedure 02/06/2025    CV NCDR CATHPCI V5 COLLECTION FORM   Stress Test:  No results found for this or any previous visit from the past 1095 days.    Cardiac Imaging:  No results found for this or any previous visit from the past 1095 days.       Assessment/Plan     In summary, Mrs. Shearer is an 79-year-old female with a medical history of S/P PPM (2023), hypertension, hyperlipidemia, diabetes, atrial fibrillation on Eliquis who was admitted and seen by me at [facility] on 5/17/2022 for runs of atrial fibrillation with ambulation.     Assessment     # Heart Failure with preserved LVEF (LVEF ~55%) / S/P PPM (11/29/2023)  - Patient returns today complaining of worsening SOB, with edema in both legs after PPM placement   - Last echo from 2020 with normal LVEF  - New echocardiogram (02/23/2024):   1. Left ventricular systolic function is normal with a 55% estimated ejection fraction.   2. There is reduced right ventricular systolic function.   3. Moderate pulmonic valve regurgitation.   4. Calculated pulmonary artery systolic pressure is 46 mmHg suggestive of pulmonary hypertension.   5. Poorly visualized anatomical structures due to suboptimal image quality.  - Patient have not started higher dose of Bumex as suggested before.  - Keep Bumex to 2mg daily   - Keep Lisinopril 40mg daily  - Patient returned previously felling well. However, she states that she increased her weight in 3lb this week and is feeling a liitle bit more shorness of breath than usual. She is  also non-compliant with salt restriction - for example, diet rich in sausages.  - Keep Metoprolol tartrate 100mg BID.  - Notably, patient had a positive stress test in 2017 per anesthesia assessment. It was ultimately though to be related to cardiac scar. Cardiac MRI did not show signs of amyloidosis. Patient also has pulmonary hypertension.   - Left Heart Catheterization (01/10/2025):  Normal coronaries. Preserved LV systolic function.  - She will be referred for Watchman procedure.  - Follow up in heart failure clinic.   - Follow up in 6 months.     # Paroxysmal atrial fibrillation with history of recurrent falls / GI bleeding  - Elevated [id number] score of 3 which implies higher risk for thromboembolic events yearly, therefore should continue on stroke prophylaxis with DOAC - Eliquis 5mg BID.  - Patient is currently asymptomatic on rate control strategy with Metoprolol and Eliquis.   - Prior EKG showing atrial fibrillation.  - Prior echocardiogram shows normal left ventricle ejection fraction  - We had a thorough conversation about the triggers for atrial fibrillation, including alcohol, caffeine and chocolate.  - Counseled to exercise for better conditioning, for losing weight and to lower the baseline heart rate.  - Patient reports that she has been admitted in December 2024 in another hospital due to GI bleeding - hemorrhoids. She is back to DOAC - Eliquis. She will be schedule for Left Atrial Appendage Closure (LAAC) in [place].   - Patient evaluated by EP team - will be referred to Left Atrial Appendage Closure (LAAC) in [place] with Dr. Barrientos.     # Hypertension  - Hypertensive on current regimen  - Keep Lisinopril 40mg daily, Metoprolol tartrate 100mg BID  - Keep Lisinopril 40mg daily.     # Diabetes & Hyperlipidemia  - LDL 39, HDL 30, Tri 329.  - Controlled by PCP.  - Keep home medication with Lovastatin 20mg daily.  - Patient allergic to Fenofibrate.   - Counseled on healthy diet and regular exercise.       # Hypothyroidism  - Keep Levothyroxine 137mcg daily     # S/P PPM (11/29/2023)  - Recent PPM placement  - EKG on Afib      We have discussed the most common side effects of the prescribed medications, indications, drug interactions, risks, complications, and alternatives of medications/therapeutics were explained and discussed. The patient has been requested to monitor closely for any untoward side effects or complications of medications. The patient has been strongly advised to be compliant with the recommendations, all the questions and concerns have been addressed. The patient has been also instructed to call, to return sooner or to go to the emergency department if symptoms persist or get worsen. The patient voiced understanding and denies any further questions at this time.      This note was transcribed using the Dragon Dictation system. There may be grammatical, punctuation, or verbiage errors that occur with voice recognition programs.     Counseling greater than 50% of visit regarding all cardiac issues.     Thank you for allowing me to participate in the care of this patient. Please do not hesitate to contact me with any further questions or concerns.     Benjamin Baker MD  Cardiology

## 2025-02-17 PROBLEM — E66.811 CLASS 1 OBESITY WITH BODY MASS INDEX (BMI) OF 34.0 TO 34.9 IN ADULT: Status: ACTIVE | Noted: 2024-08-26

## 2025-02-20 ENCOUNTER — APPOINTMENT (OUTPATIENT)
Dept: CARDIOLOGY | Facility: HOSPITAL | Age: 80
End: 2025-02-20
Payer: COMMERCIAL

## 2025-02-21 ENCOUNTER — APPOINTMENT (OUTPATIENT)
Dept: NEPHROLOGY | Facility: CLINIC | Age: 80
End: 2025-02-21
Payer: COMMERCIAL

## 2025-02-21 ENCOUNTER — OFFICE VISIT (OUTPATIENT)
Dept: NEPHROLOGY | Facility: CLINIC | Age: 80
End: 2025-02-21
Payer: COMMERCIAL

## 2025-02-21 VITALS
DIASTOLIC BLOOD PRESSURE: 76 MMHG | WEIGHT: 186.6 LBS | HEART RATE: 67 BPM | BODY MASS INDEX: 35.23 KG/M2 | HEIGHT: 61 IN | OXYGEN SATURATION: 99 % | SYSTOLIC BLOOD PRESSURE: 126 MMHG

## 2025-02-21 DIAGNOSIS — E11.22 TYPE 2 DIABETES MELLITUS WITH STAGE 3B CHRONIC KIDNEY DISEASE, WITH LONG-TERM CURRENT USE OF INSULIN (MULTI): ICD-10-CM

## 2025-02-21 DIAGNOSIS — N18.32 TYPE 2 DIABETES MELLITUS WITH STAGE 3B CHRONIC KIDNEY DISEASE, WITH LONG-TERM CURRENT USE OF INSULIN (MULTI): ICD-10-CM

## 2025-02-21 DIAGNOSIS — E78.2 MIXED DYSLIPIDEMIA: ICD-10-CM

## 2025-02-21 DIAGNOSIS — I10 PRIMARY HYPERTENSION: ICD-10-CM

## 2025-02-21 DIAGNOSIS — Z79.4 TYPE 2 DIABETES MELLITUS WITH STAGE 3B CHRONIC KIDNEY DISEASE, WITH LONG-TERM CURRENT USE OF INSULIN (MULTI): ICD-10-CM

## 2025-02-21 DIAGNOSIS — N18.32 STAGE 3B CHRONIC KIDNEY DISEASE (MULTI): Primary | ICD-10-CM

## 2025-02-21 PROBLEM — E22.2 SIADH (SYNDROME OF INAPPROPRIATE ADH PRODUCTION) (MULTI): Status: RESOLVED | Noted: 2023-09-01 | Resolved: 2025-02-21

## 2025-02-21 PROCEDURE — 99213 OFFICE O/P EST LOW 20 MIN: CPT | Performed by: CLINICAL NURSE SPECIALIST

## 2025-02-21 PROCEDURE — 3078F DIAST BP <80 MM HG: CPT | Performed by: CLINICAL NURSE SPECIALIST

## 2025-02-21 PROCEDURE — 1036F TOBACCO NON-USER: CPT | Performed by: CLINICAL NURSE SPECIALIST

## 2025-02-21 PROCEDURE — 1157F ADVNC CARE PLAN IN RCRD: CPT | Performed by: CLINICAL NURSE SPECIALIST

## 2025-02-21 PROCEDURE — 3074F SYST BP LT 130 MM HG: CPT | Performed by: CLINICAL NURSE SPECIALIST

## 2025-02-21 PROCEDURE — 1159F MED LIST DOCD IN RCRD: CPT | Performed by: CLINICAL NURSE SPECIALIST

## 2025-02-21 PROCEDURE — 1160F RVW MEDS BY RX/DR IN RCRD: CPT | Performed by: CLINICAL NURSE SPECIALIST

## 2025-02-21 ASSESSMENT — ENCOUNTER SYMPTOMS
NEUROLOGICAL NEGATIVE: 1
GASTROINTESTINAL NEGATIVE: 1
PSYCHIATRIC NEGATIVE: 1
CONSTITUTIONAL NEGATIVE: 1
MUSCULOSKELETAL NEGATIVE: 1
RESPIRATORY NEGATIVE: 1
ENDOCRINE NEGATIVE: 1
CARDIOVASCULAR NEGATIVE: 1

## 2025-02-21 NOTE — PROGRESS NOTES
Subjective   Patient ID: Maira Shearer is a 79 y.o. female who presents for Follow-up (3 months).  Patient being seen in follow-up for chronic kidney disease stage IIIb with history of diabetes and hypertension    Labs last completed were 1/31 at which time her glucose was 121  Renal functions BUN of 30 and creatinine of 1.77, GFR was 29  Sodium 144, potassium 3.8, chloride 97, bicarb 33  Calcium 8.8 last H&H 13.5 and 41.9  Hemoglobin A1c 7.4    She had these labs completed prior to a heart catheterization  Her cath was within normal limits and no intervention was needed  She states that she is doing well  Her blood pressure is well-controlled  She does not have any edema  She denies any lightheadedness or dizziness          Review of Systems   Constitutional: Negative.    Respiratory: Negative.     Cardiovascular: Negative.    Gastrointestinal: Negative.    Endocrine: Negative.    Genitourinary: Negative.    Musculoskeletal: Negative.    Skin: Negative.    Neurological: Negative.    Psychiatric/Behavioral: Negative.         Objective   Physical Exam  Vitals reviewed.   Constitutional:       Appearance: Normal appearance.   HENT:      Head: Normocephalic.   Cardiovascular:      Rate and Rhythm: Normal rate and regular rhythm.   Pulmonary:      Effort: Pulmonary effort is normal.      Breath sounds: Normal breath sounds.   Abdominal:      Palpations: Abdomen is soft.   Musculoskeletal:         General: Normal range of motion.   Skin:     General: Skin is warm and dry.   Neurological:      Mental Status: She is alert and oriented to person, place, and time.   Psychiatric:         Mood and Affect: Mood normal.         Behavior: Behavior normal.       Assessment/Plan   Problem List Items Addressed This Visit             ICD-10-CM    Primary hypertension I10     Blood pressure is well-controlled on lisinopril and metoprolol, no changes at this time         Stage 3b chronic kidney disease (Multi) - Primary N18.32      Creatinine had increased slightly on her January 31 lab work at which time her creatinine was 1.77, her baseline creatinine is usually 1.1-1.3, she did have a heart catheterization completed after this, I have asked her to go get her lab work completed today so we can check her BUN and creatinine, her blood pressure is well-controlled, diabetes fairly well-controlled, on SGLT2         Relevant Orders    Comprehensive metabolic panel    Magnesium    Follow Up In Nephrology    Type 2 diabetes mellitus, with long-term current use of insulin E11.9, Z79.4     Last hemoglobin A1c 7.4         Mixed dyslipidemia E78.2     CKD stage IIIb-with creatinine ranging from 1.1-1.3  History of SIADH/hyponatremia-sodium is stable  Hypothyroidism  Diabetes mellitus type 2-last hemoglobin A1c 6.6  Hypertension  Paroxysmal atrial fibrillation  Obesity  Chronic diarrhea  Metabolic alkalosis        PAULIE Stahl-ANISA, DNP 02/21/25 9:50 AM

## 2025-02-22 LAB
ALBUMIN SERPL-MCNC: 4.2 G/DL (ref 3.6–5.1)
ALP SERPL-CCNC: 123 U/L (ref 37–153)
ALT SERPL-CCNC: 12 U/L (ref 6–29)
ANION GAP SERPL CALCULATED.4IONS-SCNC: 13 MMOL/L (CALC) (ref 7–17)
AST SERPL-CCNC: 25 U/L (ref 10–35)
BILIRUB SERPL-MCNC: 0.9 MG/DL (ref 0.2–1.2)
BUN SERPL-MCNC: 24 MG/DL (ref 7–25)
CALCIUM SERPL-MCNC: 8.5 MG/DL (ref 8.6–10.4)
CHLORIDE SERPL-SCNC: 99 MMOL/L (ref 98–110)
CO2 SERPL-SCNC: 32 MMOL/L (ref 20–32)
CREAT SERPL-MCNC: 1.82 MG/DL (ref 0.6–0.95)
EGFRCR SERPLBLD CKD-EPI 2021: 28 ML/MIN/1.73M2
GLUCOSE SERPL-MCNC: 120 MG/DL (ref 65–139)
MAGNESIUM SERPL-MCNC: 2.1 MG/DL (ref 1.5–2.5)
POTASSIUM SERPL-SCNC: 4.1 MMOL/L (ref 3.5–5.3)
PROT SERPL-MCNC: 6.9 G/DL (ref 6.1–8.1)
SODIUM SERPL-SCNC: 144 MMOL/L (ref 135–146)

## 2025-02-24 DIAGNOSIS — N18.32 STAGE 3B CHRONIC KIDNEY DISEASE (MULTI): Primary | ICD-10-CM

## 2025-03-03 ENCOUNTER — PATIENT OUTREACH (OUTPATIENT)
Dept: PRIMARY CARE | Facility: CLINIC | Age: 80
End: 2025-03-03
Payer: COMMERCIAL

## 2025-03-11 ENCOUNTER — APPOINTMENT (OUTPATIENT)
Dept: CARDIOLOGY | Facility: CLINIC | Age: 80
End: 2025-03-11
Payer: COMMERCIAL

## 2025-03-11 VITALS
HEART RATE: 61 BPM | HEIGHT: 61 IN | OXYGEN SATURATION: 98 % | DIASTOLIC BLOOD PRESSURE: 81 MMHG | BODY MASS INDEX: 36.36 KG/M2 | WEIGHT: 192.6 LBS | SYSTOLIC BLOOD PRESSURE: 168 MMHG

## 2025-03-11 DIAGNOSIS — I48.0 PAROXYSMAL ATRIAL FIBRILLATION (MULTI): ICD-10-CM

## 2025-03-11 DIAGNOSIS — Z79.899 AT RISK FOR AMIODARONE TOXICITY WITH LONG TERM USE: Primary | ICD-10-CM

## 2025-03-11 DIAGNOSIS — Z91.89 AT RISK FOR AMIODARONE TOXICITY WITH LONG TERM USE: Primary | ICD-10-CM

## 2025-03-11 PROCEDURE — 3079F DIAST BP 80-89 MM HG: CPT | Performed by: STUDENT IN AN ORGANIZED HEALTH CARE EDUCATION/TRAINING PROGRAM

## 2025-03-11 PROCEDURE — 1159F MED LIST DOCD IN RCRD: CPT | Performed by: STUDENT IN AN ORGANIZED HEALTH CARE EDUCATION/TRAINING PROGRAM

## 2025-03-11 PROCEDURE — 1157F ADVNC CARE PLAN IN RCRD: CPT | Performed by: STUDENT IN AN ORGANIZED HEALTH CARE EDUCATION/TRAINING PROGRAM

## 2025-03-11 PROCEDURE — 99214 OFFICE O/P EST MOD 30 MIN: CPT | Performed by: STUDENT IN AN ORGANIZED HEALTH CARE EDUCATION/TRAINING PROGRAM

## 2025-03-11 PROCEDURE — 93000 ELECTROCARDIOGRAM COMPLETE: CPT | Performed by: STUDENT IN AN ORGANIZED HEALTH CARE EDUCATION/TRAINING PROGRAM

## 2025-03-11 PROCEDURE — 3077F SYST BP >= 140 MM HG: CPT | Performed by: STUDENT IN AN ORGANIZED HEALTH CARE EDUCATION/TRAINING PROGRAM

## 2025-03-11 PROCEDURE — 1036F TOBACCO NON-USER: CPT | Performed by: STUDENT IN AN ORGANIZED HEALTH CARE EDUCATION/TRAINING PROGRAM

## 2025-03-11 RX ORDER — AMIODARONE HYDROCHLORIDE 200 MG/1
200 TABLET ORAL DAILY
Qty: 90 TABLET | Refills: 3 | Status: SHIPPED | OUTPATIENT
Start: 2025-03-11 | End: 2025-03-11 | Stop reason: DRUGHIGH

## 2025-03-11 RX ORDER — AMIODARONE HYDROCHLORIDE 200 MG/1
100 TABLET ORAL DAILY
Qty: 90 TABLET | Refills: 3 | Status: SHIPPED | OUTPATIENT
Start: 2025-03-11

## 2025-03-11 NOTE — PROGRESS NOTES
"    Cardiac Electrophysiology Office Visit     Referred by Dr. Melendez ref. provider found for   Chief Complaint   Patient presents with    6 month f/u     HPI:  Maira Shearer is a 79 y.o. year old female patient with h/o HTN, HLD, DM, SSS s/p dcPPM presenting today for follow up    Objective  Current Outpatient Medications   Medication Instructions    acetaminophen 325 mg chewable tablet As needed    amiodarone (PACERONE) 200 mg, oral, Daily    apixaban (ELIQUIS) 5 mg, oral, 2 times daily    BD Ultra-Fine Micro Pen Needle 32 gauge x 1/4\" needle Up to three times daily    bumetanide (BUMEX) 2 mg, oral, Daily, Take a second tablet if your weight goes up by 2 pounds.    calcium polycarbophiL (FIBERCON) 625 mg, Daily    dapagliflozin propanediol (FARXIGA) 10 mg, oral, Every 24 hours    dextran 70-hypromellose (Bion Tears) 0.1-0.3 % ophthalmic solution 1 drop, Both Eyes, 3 times daily PRN    flash glucose scanning reader (FreeStyle Alannah 2 Warren) misc Use as instructed    flash glucose sensor kit (FreeStyle Alannah 2 Sensor) kit Use as instructed    insulin lispro protamin-lispro (HumaLOG Mix 75-25 KwikPen) 100 unit/mL (75-25) injection INJECT 15 UNITS SUBCUTANEOUSLY IN THE MORNING and 15 UNITS NIGHTLY    levothyroxine (SYNTHROID, LEVOXYL) 125 mcg, oral, Daily, Take on an empty stomach at the same time each day, either 30 to 60 minutes prior to breakfast    lisinopril 40 mg, Daily    loratadine (CLARITIN) 10 mg, Once as needed    lovastatin (MEVACOR) 20 mg, oral, Nightly    metoprolol tartrate (LOPRESSOR) 50 mg, oral, 2 times daily    pantoprazole (PROTONIX) 40 mg, Daily before breakfast    potassium chloride CR 20 mEq ER tablet 20 mEq, oral, 3 times daily    sennosides-docusate sodium (Melissa-Colace) 8.6-50 mg tablet 1 tablet, As needed         Visit Vitals  Pulse 61   Ht 1.549 m (5' 1\")   Wt 87.4 kg (192 lb 9.6 oz)   SpO2 98% Comment: via n/c   BMI 36.39 kg/m²   Smoking Status Never   BSA 1.94 m²      Physical " Exam  Constitutional:       Appearance: Normal appearance.   HENT:      Head: Normocephalic.   Eyes:      Pupils: Pupils are equal, round, and reactive to light.   Cardiovascular:      Rate and Rhythm: Normal rate and regular rhythm.      Pulses: Normal pulses.      Comments: right sided implant healed well, no ecchymosis, hematoma or drainage noted  Pulmonary:      Effort: Pulmonary effort is normal. No respiratory distress.      Breath sounds: Normal breath sounds.   Musculoskeletal:      Right lower leg: Edema present.      Left lower leg: Edema present.      Comments: Vascular venous prominence noted along RUE   Skin:     General: Skin is warm and dry.      Capillary Refill: Capillary refill takes less than 2 seconds.   Neurological:      Mental Status: She is alert.           My Interpretation of Reviewed Study(s):  Echo (February 2024): Normal LV function EF 55% no regional wall motion normality.  Mildly dilated left atrium normal right atrial size.  No pericardial effusion  [id number] Score  Age >= 75: 2   Sex Female: 1   CHF History No: 0   HTN Yes: 1   Stroke/TIA/Thromboembolism No: 0   Vascular Dz: CAD/PAD/Aortic Plaque No: 0   DM Yes: 1   Total Score 5     Assessment/Plan   #SSS s/p dcPPM (MDT July 2023)  Patient has a Medtronic Dual chamber pacemaker.  Anticipated battery longevity 9.2yrs (as of 2/12/25).    RA pacing 98.4%, RV pacing 47.8%.   Arrhythmias noted on interrogation: No AF since Sept 2024  Lead parameters stable with steady impedance and thresholds noted: Stable high RV thresholds  Given patient's age and comorbidities extraction might not be an ideal option however since the leads are young and might be relatively easier to remove and reimplant (venous congestion on the right upper extremity may also require revision/venoplasty if symptoms worsen)  c/t to follow with device clinic as scheduled  RV threshold at this time have been stable we will continue to monitor if increasing then we  will need to consider evaluation for new lead or lead revision.      #Paroxysmal atrial fibrillation  AF Dx History: early 2020s; h/o Cardioversion: No; AAD Use: None; Anticoagulation use: Apixaban 5mg BID (current); h/o Ablation: None; [id number] Score: 5  Overall burden on device around ~20%  c/w AC: Apixaban 5mg BID  Decrease Amiodarone to 100mg Daily  Decrease Metoprolol tartrate to 50mg BID    #Amiodarone - High risk medication  Labs:  Recent Labs     02/21/25  1036 12/11/24  1858 12/02/24  1233 11/15/24  0745 08/01/24  0723 05/01/24  1153 04/24/24  1017 07/21/23  1120 06/23/23  0725 07/20/22  0725   AST 25 24 22 27  --  25   < > 29  --   --    ALT 12 13 15 13  --  14   < > 15  --   --    TSH  --   --   --  5.01* 0.37*  --   --  7.60* 5.08* 3.49    < > = values in this interval not displayed.     Follow up:  TSH (Every 6 months): Dated Nov 2024; Within normal limits / No change compared to prior  LFTs (Every 6 months): Dated Feb 2025; Within normal limits / No change compared to prior  ECG (Annual): ECG Dated 3/11/25; QT/Qtc changed - plan Decrease dose  Decrease to Amiodarone 100mg once a day       #LAAO Device Implantation Evaluation  Pt has h/o Atrial fibrillation and has previsouly been on AC with Apixaban 5mg BID, Pt has had h/o Falls.   ATY8AN8-CRPs score of 5.   Pt does need stroke prophylaxis therapy, however given h/o recurrent falls cannot be on uninterrupted anticoagulation, thus LAAO implantation is a reasonable strategy.   I discussed with the patient and her daughter that with the LAAO implantation, 45 days/6 weeks of AC with DAPT vs DOAC vs VKA is still needed, and imaging at that time.  Our strategy is to perform cardiac CT following left atrial appendage occlusion device.  Cardiac CTA has been shown to be more sensitive for detection of device leak and device related thrombus compared to MALORIE, an additional option is a noninvasive modality with less risk for patient then MALORIE which requires  esophageal intubation with anesthesia.  I also informed that the patient that the procedure would be come at [facility]. We discussed the risks and benefits of MIKAELA closure procedure associated with the watchman device as a non-pharmacological strategy for stroke prevention in the setting of Afib and contraindications for long term oral anticoagulation.   [facility] not an option, pt pending her appt with Dr. Barrientos at [place] since that location with   Post procecure AC Plan - DAPT for 6 months post procedure. D/c Apixaban 2 weeks prior to Watchman implant and continue with Aspirin and Plavix.  4 months after device implant repeat cardiac CTA is indicated for device survelllance, to assess for leak, rule ut device related thrombi and assess device position.    Return to Clinic: Patient should return to the EP Clinic in 6 months    Jose Conde MD [facility]  Cardiac Electrophysiology  [email or web address]    **Disclaimer: This note was dictated by speech recognition, and every effort has been made to prevent any error in transcription, however minor errors may be present**

## 2025-03-19 ENCOUNTER — APPOINTMENT (OUTPATIENT)
Dept: CARDIOLOGY | Facility: CLINIC | Age: 80
End: 2025-03-19
Payer: COMMERCIAL

## 2025-03-20 ENCOUNTER — APPOINTMENT (OUTPATIENT)
Dept: CARDIAC REHAB | Facility: HOSPITAL | Age: 80
End: 2025-03-20
Payer: COMMERCIAL

## 2025-03-20 ENCOUNTER — CLINICAL SUPPORT (OUTPATIENT)
Dept: CARDIAC REHAB | Facility: HOSPITAL | Age: 80
End: 2025-03-20
Payer: COMMERCIAL

## 2025-03-20 ENCOUNTER — HOSPITAL ENCOUNTER (OUTPATIENT)
Dept: CARDIOLOGY | Facility: HOSPITAL | Age: 80
Discharge: HOME | End: 2025-03-20
Payer: COMMERCIAL

## 2025-03-20 VITALS
BODY MASS INDEX: 35.58 KG/M2 | WEIGHT: 188.3 LBS | RESPIRATION RATE: 18 BRPM | HEART RATE: 64 BPM | OXYGEN SATURATION: 98 % | DIASTOLIC BLOOD PRESSURE: 85 MMHG | SYSTOLIC BLOOD PRESSURE: 165 MMHG

## 2025-03-20 DIAGNOSIS — I44.2 ATRIOVENTRICULAR BLOCK, COMPLETE (MULTI): ICD-10-CM

## 2025-03-20 DIAGNOSIS — Z95.0 PACEMAKER: Primary | ICD-10-CM

## 2025-03-20 DIAGNOSIS — I50.32 CHRONIC DIASTOLIC (CONGESTIVE) HEART FAILURE: ICD-10-CM

## 2025-03-20 DIAGNOSIS — I10 PRIMARY HYPERTENSION: Primary | ICD-10-CM

## 2025-03-20 PROCEDURE — 99213 OFFICE O/P EST LOW 20 MIN: CPT

## 2025-03-20 PROCEDURE — 93280 PM DEVICE PROGR EVAL DUAL: CPT

## 2025-03-20 RX ORDER — LISINOPRIL 40 MG/1
40 TABLET ORAL DAILY
Qty: 90 TABLET | Refills: 3 | Status: SHIPPED | OUTPATIENT
Start: 2025-03-20 | End: 2026-03-20

## 2025-03-20 NOTE — PATIENT INSTRUCTIONS
Continue medications at same dose    Diet  2000 mg (2 gram) sodium diet-Do not add salt to foods or cook with salt. Check labels for Sodium (Na)     Resources  Heart Failure Clinic [phone] Monday - Friday 7:00 am - 3:00 pm  Websites: [email or web address]; American Heart Association: www.heart.org    Special Instructions:  If you smoke-Quit!  If you are not able to contact your doctor and need immediate medical attention, call 911  If you are not able to keep you're scheduled appointment at the Heart Failure Clinic, call [phone]  Watch for and report to your doctor all warning signs of Heart Failure (increased difficulty with breathing, 3-5 pound weight gain in one week or less, increased swelling, increased tiredness)  Weigh yourself each day at the same time with the same amount of clothes on. Record your weight log. Bring it with your to each visit

## 2025-03-20 NOTE — PROGRESS NOTES
Maira arrived ambulatory to the Heart Failure Clinic for her scheduled visit. Maira reports that her amiodarone was recently decreased to half a tablet. Maira states that her blood pressures have been running higher aw well, generally 150s systolically. She denies increased shortness of breath, PND, or Orthopnea. Maira is not wearing 2L O2 with activity. Eating and sleeping without distress. Home medications reviewed. Lisinopril is now on her medication list and Maira reports that she has not been taking this and not sure when it was restarted, she reports that her amlodipine had been stopped. Luisa notified of visit and blood pressure readings. Luisa instructed for Maira to start taking Lisinopril 40mg daily. Prescirption has been sent to [facility], Maira will follow up in 1 month.  Reviewed signs and symptoms of increased heart failure and when to call for help. Patient verbalizes understanding for: Low sodium diet: 2000 mg daily of sodium, follow-up appointment with [facility], weighing self daily, medications dose and schedule, and signs of increased heart failure.      Vitals:  BP: 165/85           HR: 64           Resp: 18          SPO2: 98% on 2L       Weight:  188.3lbs                        Previous Weight: 200.1lbs         Lung Sounds: clear    Edema: +1 bl legs    JVD: absent     Labs: OP labs    Medications titration: Lisinopril 40mg daily    Next Appointment Date: April 23, 2025@ 10am    Note in EMR for treating Physician: Dr. Baker    In-House Supervising Physician: Dr. Magana

## 2025-03-21 ENCOUNTER — APPOINTMENT (OUTPATIENT)
Dept: NEPHROLOGY | Facility: CLINIC | Age: 80
End: 2025-03-21
Payer: COMMERCIAL

## 2025-03-21 VITALS
HEART RATE: 90 BPM | BODY MASS INDEX: 36.25 KG/M2 | HEIGHT: 61 IN | WEIGHT: 192 LBS | DIASTOLIC BLOOD PRESSURE: 84 MMHG | SYSTOLIC BLOOD PRESSURE: 146 MMHG

## 2025-03-21 DIAGNOSIS — N18.32 STAGE 3B CHRONIC KIDNEY DISEASE (MULTI): Primary | ICD-10-CM

## 2025-03-21 DIAGNOSIS — Z79.4 TYPE 2 DIABETES MELLITUS WITH STAGE 3B CHRONIC KIDNEY DISEASE, WITH LONG-TERM CURRENT USE OF INSULIN (MULTI): ICD-10-CM

## 2025-03-21 DIAGNOSIS — E11.22 TYPE 2 DIABETES MELLITUS WITH STAGE 3B CHRONIC KIDNEY DISEASE, WITH LONG-TERM CURRENT USE OF INSULIN (MULTI): ICD-10-CM

## 2025-03-21 DIAGNOSIS — N18.32 TYPE 2 DIABETES MELLITUS WITH STAGE 3B CHRONIC KIDNEY DISEASE, WITH LONG-TERM CURRENT USE OF INSULIN (MULTI): ICD-10-CM

## 2025-03-21 DIAGNOSIS — I10 PRIMARY HYPERTENSION: ICD-10-CM

## 2025-03-21 DIAGNOSIS — E79.0 HYPERURICEMIA: ICD-10-CM

## 2025-03-21 LAB — TSH SERPL-ACNC: 1.45 MIU/L (ref 0.4–4.5)

## 2025-03-21 PROCEDURE — 1157F ADVNC CARE PLAN IN RCRD: CPT | Performed by: CLINICAL NURSE SPECIALIST

## 2025-03-21 PROCEDURE — 99213 OFFICE O/P EST LOW 20 MIN: CPT | Performed by: CLINICAL NURSE SPECIALIST

## 2025-03-21 PROCEDURE — 1159F MED LIST DOCD IN RCRD: CPT | Performed by: CLINICAL NURSE SPECIALIST

## 2025-03-21 PROCEDURE — 3079F DIAST BP 80-89 MM HG: CPT | Performed by: CLINICAL NURSE SPECIALIST

## 2025-03-21 PROCEDURE — 1160F RVW MEDS BY RX/DR IN RCRD: CPT | Performed by: CLINICAL NURSE SPECIALIST

## 2025-03-21 PROCEDURE — 3077F SYST BP >= 140 MM HG: CPT | Performed by: CLINICAL NURSE SPECIALIST

## 2025-03-21 PROCEDURE — 1036F TOBACCO NON-USER: CPT | Performed by: CLINICAL NURSE SPECIALIST

## 2025-03-21 RX ORDER — ALLOPURINOL 100 MG/1
100 TABLET ORAL DAILY
Qty: 30 TABLET | Refills: 5 | Status: SHIPPED | OUTPATIENT
Start: 2025-03-21 | End: 2025-09-17

## 2025-03-21 ASSESSMENT — ENCOUNTER SYMPTOMS
SHORTNESS OF BREATH: 1
NEUROLOGICAL NEGATIVE: 1
CONSTITUTIONAL NEGATIVE: 1
PSYCHIATRIC NEGATIVE: 1
GASTROINTESTINAL NEGATIVE: 1
MUSCULOSKELETAL NEGATIVE: 1
ENDOCRINE NEGATIVE: 1

## 2025-03-21 NOTE — ASSESSMENT & PLAN NOTE
Blood pressure is improved today from yesterday, was restarted on lisinopril 40 mg daily, is also on metoprolol

## 2025-03-21 NOTE — PROGRESS NOTES
Subjective   Patient ID: Maira Shearer is a 79 y.o. female who presents for Follow-up (1 month /Review labs ).  Patient being seen in follow-up for chronic kidney disease stage IIIb    Labs reviewed  Glucose 190  Renal functions BUN of 21 and creatinine 1.57, GFR is 33  Sodium 141, potassium 3.8, chloride 96, bicarb 33  Uric acid 7.7  H&H 13.6 and 41.4  Urine positive for glucose  TSH 1.45  Albumin creatinine ratio pending    She has been feeling well  She was in heart failure yesterday and at that time her blood pressure was slightly high, She had not been restarted on her lisinopril 40 mg which was restarted yesterday, blood pressure is much improved today  Mild edema of the lower extremities, left greater than right, has compression stockings on  Has some shortness of breath with exertion, is having difficulty with her portable oxygen tank        Review of Systems   Constitutional: Negative.    Respiratory:  Positive for shortness of breath (With exertion).    Cardiovascular:  Positive for leg swelling.   Gastrointestinal: Negative.    Endocrine: Negative.    Genitourinary: Negative.    Musculoskeletal: Negative.    Skin: Negative.    Neurological: Negative.    Psychiatric/Behavioral: Negative.         Objective   Physical Exam  Vitals reviewed.   Constitutional:       Appearance: Normal appearance.   HENT:      Head: Normocephalic.   Cardiovascular:      Rate and Rhythm: Normal rate and regular rhythm.   Pulmonary:      Effort: Pulmonary effort is normal.      Breath sounds: Normal breath sounds.   Abdominal:      Palpations: Abdomen is soft.   Musculoskeletal:         General: Normal range of motion.      Right lower leg: Edema present.      Left lower leg: Edema (Left greater than right) present.   Skin:     General: Skin is warm and dry.   Neurological:      Mental Status: She is alert and oriented to person, place, and time.   Psychiatric:         Mood and Affect: Mood normal.         Behavior: Behavior  normal.         Assessment/Plan   Problem List Items Addressed This Visit             ICD-10-CM    Primary hypertension I10     Blood pressure is improved today from yesterday, was restarted on lisinopril 40 mg daily, is also on metoprolol         Stage 3b chronic kidney disease (Multi) - Primary N18.32     Renal function stable with creatinine of 1.57, GFR is 33, of note this is off of lisinopril, lisinopril was restarted yesterday, will monitor and repeat lab work in 1 month, diabetes fairly well-controlled with last hemoglobin A1c of 7.2, on SGLT2/Farxiga         Relevant Orders    Follow Up In Nephrology    Basic metabolic panel    Uric acid    Basic metabolic panel    Type 2 diabetes mellitus, with long-term current use of insulin E11.9, Z79.4    Hyperuricemia E79.0     Uric acid slightly elevated at 7.7, will start allopurinol 100 mg daily         Relevant Medications    allopurinol (Zyloprim) 100 mg tablet     CKD stage IIIb-with creatinine ranging from 1.6-1.8  History of SIADH/hyponatremia-sodium is stable  Hypothyroidism  Diabetes mellitus type 2-last hemoglobin A1c 7.4  Hypertension  Paroxysmal atrial fibrillation  Obesity  Chronic diarrhea  Metabolic alkalosis        PAULIE Stahl-ANISA, DNP 03/21/25 10:24 AM

## 2025-03-24 DIAGNOSIS — N18.32 STAGE 3B CHRONIC KIDNEY DISEASE (MULTI): ICD-10-CM

## 2025-03-24 LAB
ALBUMIN SERPL-MCNC: 4.4 G/DL (ref 3.6–5.1)
ALBUMIN/CREAT UR: 218 MG/G CREAT
ALP SERPL-CCNC: 163 U/L (ref 37–153)
ALT SERPL-CCNC: 16 U/L (ref 6–29)
ANION GAP SERPL CALCULATED.4IONS-SCNC: 12 MMOL/L (CALC) (ref 7–17)
APPEARANCE UR: CLEAR
AST SERPL-CCNC: 31 U/L (ref 10–35)
BILIRUB SERPL-MCNC: 1.1 MG/DL (ref 0.2–1.2)
BILIRUB UR QL STRIP: NEGATIVE
BUN SERPL-MCNC: 21 MG/DL (ref 7–25)
CALCIUM SERPL-MCNC: 8.8 MG/DL (ref 8.6–10.4)
CHLORIDE SERPL-SCNC: 96 MMOL/L (ref 98–110)
CO2 SERPL-SCNC: 33 MMOL/L (ref 20–32)
COLOR UR: YELLOW
CREAT SERPL-MCNC: 1.57 MG/DL (ref 0.6–0.95)
CREAT UR-MCNC: 11 MG/DL (ref 20–275)
EGFRCR SERPLBLD CKD-EPI 2021: 33 ML/MIN/1.73M2
ERYTHROCYTE [DISTWIDTH] IN BLOOD BY AUTOMATED COUNT: 13.2 % (ref 11–15)
GLUCOSE SERPL-MCNC: 190 MG/DL (ref 65–99)
GLUCOSE UR QL STRIP: ABNORMAL
HCT VFR BLD AUTO: 41.4 % (ref 35–45)
HGB BLD-MCNC: 13.6 G/DL (ref 11.7–15.5)
HGB UR QL STRIP: NEGATIVE
KETONES UR QL STRIP: NEGATIVE
LEUKOCYTE ESTERASE UR QL STRIP: NEGATIVE
MCH RBC QN AUTO: 33.7 PG (ref 27–33)
MCHC RBC AUTO-ENTMCNC: 32.9 G/DL (ref 32–36)
MCV RBC AUTO: 102.7 FL (ref 80–100)
MICROALBUMIN UR-MCNC: 2.4 MG/DL
NITRITE UR QL STRIP: NEGATIVE
PH UR STRIP: 8 [PH] (ref 5–8)
PLATELET # BLD AUTO: 176 THOUSAND/UL (ref 140–400)
PMV BLD REES-ECKER: 9.1 FL (ref 7.5–12.5)
POTASSIUM SERPL-SCNC: 3.8 MMOL/L (ref 3.5–5.3)
PROT SERPL-MCNC: 7.2 G/DL (ref 6.1–8.1)
PROT UR QL STRIP: NEGATIVE
RBC # BLD AUTO: 4.03 MILLION/UL (ref 3.8–5.1)
SODIUM SERPL-SCNC: 141 MMOL/L (ref 135–146)
SP GR UR STRIP: 1.01 (ref 1–1.03)
URATE SERPL-MCNC: 7.7 MG/DL (ref 2.5–7)
WBC # BLD AUTO: 6.3 THOUSAND/UL (ref 3.8–10.8)

## 2025-03-25 NOTE — PROGRESS NOTES
This was a virtual clinic encounter, total of 62 minutes was spent during this encounter.  This was an audio only encounter with 32 minutes of direct patient contact.    Cardio: Dr. Samuel BOOTH was asked by Dr. Baker to evaluate this patient in consultation for evaluation of left atrial appendage closure.    The patient is a 79 y.o.  female with PMH of hypertension, chronic kidney disease, permanent pacemaker placement, diabetes mellitus, heart failure with preserved ejection fraction, and paroxysmal atrial fibrillation. The patient has normal LVEF with left ventricular ejection fraction of 55% based on transthoracic echocardiogram February 2024.    Patient has been on Eliquis for stroke risk reduction.  She is also on aspirin.    The patient has a history of lower GI bleeding and anemia as well as recurrent falls.  Her last fall was 3 weeks ago whereupon she tripped over her oxygen tubing.  There was no head injury, laceration or need for emergency attention.    Given the patient's significant fall risk as well as history of lower GI bleeding and anemia,  the patient is referred for consideration of left atrial appendage closure for stroke risk reduction.       ROS:  Constitutional: no fatigue, no fevers, no body aches  Eyes: no acute eye problems, no blurred vision, no diplopia, no eye pain  ENT:  no acute hearing loss, no earache, no sore throat  Cardiovascular: dyspnea on exertion, no chest pain  Respiratory: no chronic cough, not coughing up sputum, no wheezing that is consistent with asthma  Gastrointestinal: no acute bowel complaints  Musculoskeletal: no acute arthralgias, no acute myalgias, no acute joint swelling  Skin: no skin rashes, no change in skin color and pigmentation, no skin lesions and no skin lumps.   Neurological: no headaches, no dizziness, no tingling, no fainting and no limb weakness.   Psychiatric:  no suicidal ideation, no confusion, no personality change and no emotional problems.    Hematologic/Lymphatic: no bleeding issues, other then mentioned in HPI  All other systems have been reviewed and are negative for complaint.     Physical Exam:     Visit Vitals  Smoking Status Never          Labs:    Results for orders placed or performed in visit on 03/24/25   Comprehensive metabolic panel    Collection Time: 03/20/25 11:03 AM   Result Value Ref Range    GLUCOSE 190 (H) 65 - 99 mg/dL    UREA NITROGEN (BUN) 21 7 - 25 mg/dL    CREATININE 1.57 (H) 0.60 - 0.95 mg/dL    EGFR 33 (L) > OR = 60 mL/min/1.73m2    SODIUM 141 135 - 146 mmol/L    POTASSIUM 3.8 3.5 - 5.3 mmol/L    CHLORIDE 96 (L) 98 - 110 mmol/L    CARBON DIOXIDE 33 (H) 20 - 32 mmol/L    ELECTROLYTE BALANCE 12 7 - 17 mmol/L (calc)    CALCIUM 8.8 8.6 - 10.4 mg/dL    PROTEIN, TOTAL 7.2 6.1 - 8.1 g/dL    ALBUMIN 4.4 3.6 - 5.1 g/dL    BILIRUBIN, TOTAL 1.1 0.2 - 1.2 mg/dL    ALKALINE PHOSPHATASE 163 (H) 37 - 153 U/L    AST 31 10 - 35 U/L    ALT 16 6 - 29 U/L   Uric acid    Collection Time: 03/20/25 11:03 AM   Result Value Ref Range    URIC ACID 7.7 (H) 2.5 - 7.0 mg/dL   CBC    Collection Time: 03/20/25 11:03 AM   Result Value Ref Range    WHITE BLOOD CELL COUNT 6.3 3.8 - 10.8 Thousand/uL    RED BLOOD CELL COUNT 4.03 3.80 - 5.10 Million/uL    HEMOGLOBIN 13.6 11.7 - 15.5 g/dL    HEMATOCRIT 41.4 35.0 - 45.0 %    .7 (H) 80.0 - 100.0 fL    MCH 33.7 (H) 27.0 - 33.0 pg    MCHC 32.9 32.0 - 36.0 g/dL    RDW 13.2 11.0 - 15.0 %    PLATELET COUNT 176 140 - 400 Thousand/uL    MPV 9.1 7.5 - 12.5 fL   Urinalysis with Reflex Microscopic    Collection Time: 03/20/25 11:03 AM   Result Value Ref Range    COLOR YELLOW YELLOW    APPEARANCE CLEAR CLEAR    SPECIFIC GRAVITY 1.010 1.001 - 1.035    PH 8.0 5.0 - 8.0    GLUCOSE 3+ (A) NEGATIVE    BILIRUBIN NEGATIVE NEGATIVE    KETONES NEGATIVE NEGATIVE    OCCULT BLOOD NEGATIVE NEGATIVE    PROTEIN NEGATIVE NEGATIVE    NITRITE NEGATIVE NEGATIVE    LEUKOCYTE ESTERASE NEGATIVE NEGATIVE   Albumin-Creatinine Ratio, Urine  "Random    Collection Time: 03/20/25 11:03 AM   Result Value Ref Range    CREATININE, RANDOM URINE 11 (L) 20 - 275 mg/dL    ALBUMIN, URINE 2.4 See Note: mg/dL    ALBUMIN/CREATININE RATIO, RANDOM URINE 218 (H) <30 mg/g creat     *Note: Due to a large number of results and/or encounters for the requested time period, some results have not been displayed. A complete set of results can be found in Results Review.          Medications:    Current Outpatient Medications   Medication Instructions    acetaminophen 325 mg chewable tablet As needed    allopurinol (ZYLOPRIM) 100 mg, oral, Daily    amiodarone (PACERONE) 100 mg, oral, Daily    apixaban (ELIQUIS) 5 mg, oral, 2 times daily    BD Ultra-Fine Micro Pen Needle 32 gauge x 1/4\" needle Up to three times daily    bumetanide (BUMEX) 2 mg, oral, Daily, Take a second tablet if your weight goes up by 2 pounds.    calcium polycarbophiL (FIBERCON) 625 mg, Daily    dapagliflozin propanediol (FARXIGA) 10 mg, oral, Every 24 hours    dextran 70-hypromellose (Bion Tears) 0.1-0.3 % ophthalmic solution 1 drop, Both Eyes, 3 times daily PRN    flash glucose scanning reader (FreeStyle Alannah 2 [place]) misc Use as instructed    flash glucose sensor kit (FreeStyle Alannah 2 Sensor) kit Use as instructed    insulin lispro protamin-lispro (HumaLOG Mix 75-25 KwikPen) 100 unit/mL (75-25) injection INJECT 15 UNITS SUBCUTANEOUSLY IN THE MORNING and 15 UNITS NIGHTLY    levothyroxine (SYNTHROID, LEVOXYL) 125 mcg, oral, Daily, Take on an empty stomach at the same time each day, either 30 to 60 minutes prior to breakfast    lisinopril 40 mg, oral, Daily    loratadine (CLARITIN) 10 mg, Once as needed    lovastatin (MEVACOR) 20 mg, oral, Nightly    metoprolol tartrate (LOPRESSOR) 50 mg, oral, 2 times daily    pantoprazole (PROTONIX) 40 mg, Daily before breakfast    potassium chloride CR 20 mEq ER tablet 20 mEq, oral, 3 times daily    sennosides-docusate sodium (Melissa-Colace) 8.6-50 mg tablet 1 tablet, As " needed          Assessmen/Plan:      This is a 79 y.o. female with paroxysmal atrial fibrillation and multiple medical issues, as described above including history of GI bleeding, anemia and mechanical falls.        The CHADS-VASC score is 6 (age, female sex, heart failure, vascular, diabetes) and HAS-BLED score is 5. The patient is at increased risk of both bleeding and stroke.  As such the patient is a reasonable candidate for consideration of left atrial appendage occluder placement.    Today we discussed the left atrial appendage closure procedure. The patient was given written educational handout materials and watched an educational video. All risks, benefits and alternative were discussed.     The risks discussed included but were not limited to vascular complications, sedation related complications, risk of MI, CVA, device embolization, pericardial tamponade and death. The patient verbalized understanding and decided to proceed.         The patient requires CT for planning and to exclude MIKAELA thrombus. In addition, the patient will also need a CT scan 3 months after the procedure, to evaluate the device for position, thrombus and thierno-device leak. Following device implant, strategy will be for low-dose Eliquis for 3 months then aspirin for life.    Thank you, Dr. Baker, for this consultation and for allowing me to participate in the care of this patient.      Blake Barrientos MD  , Interventional Cardiology Fellowship Program   [facility]   [facility] [employer]  Office Phone Number: [phone]

## 2025-03-26 ENCOUNTER — TELEMEDICINE (OUTPATIENT)
Dept: CARDIOLOGY | Facility: HOSPITAL | Age: 80
End: 2025-03-26
Payer: COMMERCIAL

## 2025-03-26 DIAGNOSIS — I48.0 PAROXYSMAL ATRIAL FIBRILLATION (MULTI): ICD-10-CM

## 2025-03-26 PROCEDURE — 99204 OFFICE O/P NEW MOD 45 MIN: CPT | Performed by: INTERNAL MEDICINE

## 2025-03-26 PROCEDURE — 99214 OFFICE O/P EST MOD 30 MIN: CPT | Mod: 95 | Performed by: INTERNAL MEDICINE

## 2025-03-26 PROCEDURE — 1157F ADVNC CARE PLAN IN RCRD: CPT | Performed by: INTERNAL MEDICINE

## 2025-03-28 DIAGNOSIS — I48.91 ATRIAL FIBRILLATION, UNSPECIFIED TYPE (MULTI): ICD-10-CM

## 2025-04-21 DIAGNOSIS — N18.32 STAGE 3B CHRONIC KIDNEY DISEASE (MULTI): ICD-10-CM

## 2025-04-23 ENCOUNTER — APPOINTMENT (OUTPATIENT)
Dept: CARDIAC REHAB | Facility: HOSPITAL | Age: 80
End: 2025-04-23
Payer: COMMERCIAL

## 2025-04-23 VITALS
HEART RATE: 68 BPM | DIASTOLIC BLOOD PRESSURE: 73 MMHG | BODY MASS INDEX: 35.99 KG/M2 | RESPIRATION RATE: 18 BRPM | WEIGHT: 190.5 LBS | OXYGEN SATURATION: 97 % | SYSTOLIC BLOOD PRESSURE: 146 MMHG

## 2025-04-23 DIAGNOSIS — I50.32 CHRONIC DIASTOLIC (CONGESTIVE) HEART FAILURE: ICD-10-CM

## 2025-04-23 PROCEDURE — 99212 OFFICE O/P EST SF 10 MIN: CPT

## 2025-04-23 RX ORDER — DAPAGLIFLOZIN 10 MG/1
10 TABLET, FILM COATED ORAL EVERY 24 HOURS
Qty: 90 TABLET | Refills: 3 | Status: SHIPPED | OUTPATIENT
Start: 2025-04-23 | End: 2026-04-23

## 2025-04-23 NOTE — PROGRESS NOTES
"Maira arrived ambulatory to the Heart Failure Clinic for her scheduled visit for follow up of starting Lisinopril. Maira reports that she is going to be having a watchman procedure done in June. Maira's daughter reports that Maira is no longer taking Amiodarone due to side effects and taking too many other medications. Miara states she is feeling \" good\". She denies increased shortness of breath, PND, or Orthopnea. Eating and sleeping without distress. Home medications reviewed. Maira's home blood pressures have been running 120s-130s/70-80s. The plan for Maira is to continue current medication regimen and follow up in 3 months after watchman procedure. Reviewed signs and symptoms of increased heart failure and when to call for help. Patient verbalizes understanding for: Low sodium diet: 2000 mg daily of sodium, follow-up appointment with [facility], weighing self daily, medications dose and schedule, and signs of increased heart failure.      Vitals:  BP: 146/73           HR: 68           Resp: 18          SPO2: 97% on 2L       Weight: 190.5lbs                         Previous Weight: 188.3lbs         Lung Sounds: clear    Edema: +2 left ankle, +1 right ankle    JVD: absent     Labs: none    Medications Administered: none    Next Appointment Date: July 1, 2025@ 11am    Note in EMR for treating Physician: Dr. Baker    In-House Supervising Physician: Dr. Xie  "

## 2025-04-23 NOTE — PATIENT INSTRUCTIONS
Continue medications at same dose    Diet  2000 mg (2 gram) sodium diet-Do not add salt to foods or cook with salt. Check labels for Sodium (Na)     Resources  Heart Failure Clinic [phone] Monday - Friday 7:00 am - 3:00 pm  Websites: [email or web address]; American Heart Association: www.heart.org    Special Instructions:  If you smoke-Quit!  If you are not able to contact your doctor and need immediate medical attention, call 911  If you are not able to keep you're scheduled appointment at the Heart Failure Clinic, call [phone]  Watch for and report to your doctor all warning signs of Heart Failure (increased difficulty with breathing, 3-5 pound weight gain in one week or less, increased swelling, increased tiredness)  Weigh yourself each day at the same time with the same amount of clothes on. Record your weight log. Bring it with your to each visit

## 2025-05-14 ENCOUNTER — HOSPITAL ENCOUNTER (OUTPATIENT)
Dept: CARDIOLOGY | Facility: CLINIC | Age: 80
Discharge: HOME | End: 2025-05-14
Payer: COMMERCIAL

## 2025-05-14 DIAGNOSIS — Z95.0 PACEMAKER: ICD-10-CM

## 2025-05-14 DIAGNOSIS — I44.2 ATRIOVENTRICULAR BLOCK, COMPLETE (MULTI): ICD-10-CM

## 2025-05-22 DIAGNOSIS — I48.0 PAROXYSMAL ATRIAL FIBRILLATION (MULTI): ICD-10-CM

## 2025-05-23 DIAGNOSIS — I48.0 PAROXYSMAL ATRIAL FIBRILLATION (MULTI): ICD-10-CM

## 2025-05-30 ENCOUNTER — TELEPHONE (OUTPATIENT)
Dept: PRIMARY CARE | Facility: CLINIC | Age: 80
End: 2025-05-30
Payer: COMMERCIAL

## 2025-05-30 DIAGNOSIS — E11.9 TYPE 2 DIABETES MELLITUS WITHOUT COMPLICATION, WITHOUT LONG-TERM CURRENT USE OF INSULIN: ICD-10-CM

## 2025-05-30 RX ORDER — INSULIN LISPRO 100 [IU]/ML
INJECTION, SUSPENSION SUBCUTANEOUS
Qty: 9 ML | Refills: 3 | Status: SHIPPED | OUTPATIENT
Start: 2025-05-30

## 2025-06-12 DIAGNOSIS — I48.91 ATRIAL FIBRILLATION, UNSPECIFIED TYPE (MULTI): ICD-10-CM

## 2025-06-14 LAB
ANION GAP SERPL CALCULATED.4IONS-SCNC: 14 MMOL/L (CALC) (ref 7–17)
BUN SERPL-MCNC: 27 MG/DL (ref 7–25)
BUN/CREAT SERPL: 18 (CALC) (ref 6–22)
CALCIUM SERPL-MCNC: 9.1 MG/DL (ref 8.6–10.4)
CHLORIDE SERPL-SCNC: 97 MMOL/L (ref 98–110)
CO2 SERPL-SCNC: 30 MMOL/L (ref 20–32)
CREAT SERPL-MCNC: 1.51 MG/DL (ref 0.6–0.95)
EGFRCR SERPLBLD CKD-EPI 2021: 35 ML/MIN/1.73M2
ERYTHROCYTE [DISTWIDTH] IN BLOOD BY AUTOMATED COUNT: 13.5 % (ref 11–15)
GLUCOSE SERPL-MCNC: 178 MG/DL (ref 65–99)
HCT VFR BLD AUTO: 44.3 % (ref 35–45)
HGB BLD-MCNC: 14.2 G/DL (ref 11.7–15.5)
MCH RBC QN AUTO: 33 PG (ref 27–33)
MCHC RBC AUTO-ENTMCNC: 32.1 G/DL (ref 32–36)
MCV RBC AUTO: 103 FL (ref 80–100)
PLATELET # BLD AUTO: 169 THOUSAND/UL (ref 140–400)
PMV BLD REES-ECKER: 9.2 FL (ref 7.5–12.5)
POTASSIUM SERPL-SCNC: 3.9 MMOL/L (ref 3.5–5.3)
RBC # BLD AUTO: 4.3 MILLION/UL (ref 3.8–5.1)
SODIUM SERPL-SCNC: 141 MMOL/L (ref 135–146)
WBC # BLD AUTO: 7.7 THOUSAND/UL (ref 3.8–10.8)

## 2025-06-20 ENCOUNTER — APPOINTMENT (OUTPATIENT)
Dept: RADIOLOGY | Facility: CLINIC | Age: 80
End: 2025-06-20
Payer: COMMERCIAL

## 2025-06-21 DIAGNOSIS — N18.32 STAGE 3B CHRONIC KIDNEY DISEASE (MULTI): ICD-10-CM

## 2025-06-24 ENCOUNTER — HOSPITAL ENCOUNTER (OUTPATIENT)
Dept: RADIOLOGY | Facility: CLINIC | Age: 80
Discharge: HOME | DRG: 274 | End: 2025-06-24
Payer: COMMERCIAL

## 2025-06-24 ENCOUNTER — APPOINTMENT (OUTPATIENT)
Dept: NEPHROLOGY | Facility: CLINIC | Age: 80
End: 2025-06-24
Payer: COMMERCIAL

## 2025-06-24 DIAGNOSIS — I48.91 ATRIAL FIBRILLATION, UNSPECIFIED TYPE (MULTI): ICD-10-CM

## 2025-06-24 PROCEDURE — 2550000001 HC RX 255 CONTRASTS: Performed by: INTERNAL MEDICINE

## 2025-06-24 PROCEDURE — 75572 CT HRT W/3D IMAGE: CPT

## 2025-06-24 RX ADMIN — IOHEXOL 75 ML: 350 INJECTION, SOLUTION INTRAVENOUS at 14:35

## 2025-06-24 NOTE — NURSING NOTE
IV NS 500cc bolus given pre CT per DO.  Pt. Instructed to continue with oral hydration per Structural Heart dept. protocol

## 2025-06-25 PROBLEM — I48.91 ATRIAL FIBRILLATION, UNSPECIFIED TYPE (MULTI): Status: ACTIVE | Noted: 2025-06-25

## 2025-06-26 ENCOUNTER — APPOINTMENT (OUTPATIENT)
Dept: CARDIOLOGY | Facility: HOSPITAL | Age: 80
DRG: 274 | End: 2025-06-26
Payer: COMMERCIAL

## 2025-06-26 ENCOUNTER — HOSPITAL ENCOUNTER (INPATIENT)
Facility: HOSPITAL | Age: 80
LOS: 1 days | Discharge: HOME | DRG: 274 | End: 2025-06-26
Attending: INTERNAL MEDICINE | Admitting: INTERNAL MEDICINE
Payer: COMMERCIAL

## 2025-06-26 ENCOUNTER — HOSPITAL ENCOUNTER (INPATIENT)
Dept: CARDIOLOGY | Facility: HOSPITAL | Age: 80
Discharge: HOME | DRG: 274 | End: 2025-06-26
Payer: COMMERCIAL

## 2025-06-26 DIAGNOSIS — I48.91 ATRIAL FIBRILLATION, UNSPECIFIED TYPE (MULTI): Primary | ICD-10-CM

## 2025-06-26 DIAGNOSIS — I48.0 PAROXYSMAL ATRIAL FIBRILLATION (MULTI): ICD-10-CM

## 2025-06-26 DIAGNOSIS — Z01.818 PRE-OP EXAM: ICD-10-CM

## 2025-06-26 DIAGNOSIS — Z98.890 POST-OPERATIVE STATE: ICD-10-CM

## 2025-06-26 DIAGNOSIS — Z95.818 PRESENCE OF OTHER CARDIAC IMPLANTS AND GRAFTS: ICD-10-CM

## 2025-06-26 LAB
ABO GROUP (TYPE) IN BLOOD: NORMAL
ACT BLD: >397 SEC (ref 83–199)
ALBUMIN SERPL BCP-MCNC: 4.3 G/DL (ref 3.4–5)
ALP SERPL-CCNC: 162 U/L (ref 33–136)
ALT SERPL W P-5'-P-CCNC: 24 U/L (ref 7–45)
ANION GAP SERPL CALC-SCNC: 15 MMOL/L (ref 10–20)
ANTIBODY SCREEN: NORMAL
APTT PPP: 36 SECONDS (ref 26–36)
AST SERPL W P-5'-P-CCNC: 32 U/L (ref 9–39)
ATRIAL RATE: 59 BPM
BILIRUB SERPL-MCNC: 1.7 MG/DL (ref 0–1.2)
BODY SURFACE AREA: 1.93 M2
BUN SERPL-MCNC: 21 MG/DL (ref 6–23)
CALCIUM SERPL-MCNC: 8.7 MG/DL (ref 8.6–10.3)
CHLORIDE SERPL-SCNC: 95 MMOL/L (ref 98–107)
CO2 SERPL-SCNC: 33 MMOL/L (ref 21–32)
CREAT SERPL-MCNC: 1.45 MG/DL (ref 0.5–1.05)
EGFRCR SERPLBLD CKD-EPI 2021: 36 ML/MIN/1.73M*2
EJECTION FRACTION: 55 %
EJECTION FRACTION: 55 %
ERYTHROCYTE [DISTWIDTH] IN BLOOD BY AUTOMATED COUNT: 13.9 % (ref 11.5–14.5)
GLUCOSE BLD MANUAL STRIP-MCNC: 180 MG/DL (ref 74–99)
GLUCOSE SERPL-MCNC: 246 MG/DL (ref 74–99)
HCT VFR BLD AUTO: 39.2 % (ref 36–46)
HGB BLD-MCNC: 13.3 G/DL (ref 12–16)
INR PPP: 1.4 (ref 0.9–1.1)
MCH RBC QN AUTO: 34.2 PG (ref 26–34)
MCHC RBC AUTO-ENTMCNC: 33.9 G/DL (ref 32–36)
MCV RBC AUTO: 101 FL (ref 80–100)
NRBC BLD-RTO: 0 /100 WBCS (ref 0–0)
P AXIS: 71 DEGREES
P OFFSET: 69 MS
P ONSET: 6 MS
PLATELET # BLD AUTO: 142 X10*3/UL (ref 150–450)
POTASSIUM SERPL-SCNC: 3.6 MMOL/L (ref 3.5–5.3)
PR INTERVAL: 416 MS
PROT SERPL-MCNC: 7.5 G/DL (ref 6.4–8.2)
PROTHROMBIN TIME: 15.3 SECONDS (ref 9.8–12.4)
Q ONSET: 213 MS
QRS COUNT: 10 BEATS
QRS DURATION: 90 MS
QT INTERVAL: 292 MS
QTC CALCULATION(BAZETT): 289 MS
QTC FREDERICIA: 290 MS
R AXIS: 69 DEGREES
RBC # BLD AUTO: 3.89 X10*6/UL (ref 4–5.2)
RH FACTOR (ANTIGEN D): NORMAL
SODIUM SERPL-SCNC: 139 MMOL/L (ref 136–145)
T AXIS: 237 DEGREES
T OFFSET: 359 MS
VENTRICULAR RATE: 59 BPM
WBC # BLD AUTO: 7.1 X10*3/UL (ref 4.4–11.3)

## 2025-06-26 PROCEDURE — C1893 INTRO/SHEATH, FIXED,NON-PEEL: HCPCS | Performed by: INTERNAL MEDICINE

## 2025-06-26 PROCEDURE — 93308 TTE F-UP OR LMTD: CPT

## 2025-06-26 PROCEDURE — 85347 COAGULATION TIME ACTIVATED: CPT | Performed by: INTERNAL MEDICINE

## 2025-06-26 PROCEDURE — 1200000002 HC GENERAL ROOM WITH TELEMETRY DAILY

## 2025-06-26 PROCEDURE — 33340 PERQ CLSR TCAT L ATR APNDGE: CPT | Performed by: INTERNAL MEDICINE

## 2025-06-26 PROCEDURE — 1210000006 HC SEMI PRIVATE ROOM - IP ONLY PROCEDURE WITH INTENT

## 2025-06-26 PROCEDURE — 7100000009 HC PHASE TWO TIME - INITIAL BASE CHARGE: Performed by: INTERNAL MEDICINE

## 2025-06-26 PROCEDURE — C1759 CATH, INTRA ECHOCARDIOGRAPHY: HCPCS | Performed by: INTERNAL MEDICINE

## 2025-06-26 PROCEDURE — 99152 MOD SED SAME PHYS/QHP 5/>YRS: CPT | Performed by: INTERNAL MEDICINE

## 2025-06-26 PROCEDURE — 2550000001 HC RX 255 CONTRASTS: Mod: JW | Performed by: INTERNAL MEDICINE

## 2025-06-26 PROCEDURE — 02L73DK OCCLUSION OF LEFT ATRIAL APPENDAGE WITH INTRALUMINAL DEVICE, PERCUTANEOUS APPROACH: ICD-10-PCS

## 2025-06-26 PROCEDURE — 93010 ELECTROCARDIOGRAM REPORT: CPT | Performed by: INTERNAL MEDICINE

## 2025-06-26 PROCEDURE — 7100000001 HC RECOVERY ROOM TIME - INITIAL BASE CHARGE: Performed by: INTERNAL MEDICINE

## 2025-06-26 PROCEDURE — C1889 IMPLANT/INSERT DEVICE, NOC: HCPCS | Performed by: INTERNAL MEDICINE

## 2025-06-26 PROCEDURE — 2720000007 HC OR 272 NO HCPCS: Performed by: INTERNAL MEDICINE

## 2025-06-26 PROCEDURE — 93005 ELECTROCARDIOGRAM TRACING: CPT

## 2025-06-26 PROCEDURE — C1760 CLOSURE DEV, VASC: HCPCS | Performed by: INTERNAL MEDICINE

## 2025-06-26 PROCEDURE — 93308 TTE F-UP OR LMTD: CPT | Performed by: INTERNAL MEDICINE

## 2025-06-26 PROCEDURE — 2780000003 HC OR 278 NO HCPCS: Performed by: INTERNAL MEDICINE

## 2025-06-26 PROCEDURE — 2500000004 HC RX 250 GENERAL PHARMACY W/ HCPCS (ALT 636 FOR OP/ED): Performed by: INTERNAL MEDICINE

## 2025-06-26 PROCEDURE — 85347 COAGULATION TIME ACTIVATED: CPT

## 2025-06-26 PROCEDURE — 2500000001 HC RX 250 WO HCPCS SELF ADMINISTERED DRUGS (ALT 637 FOR MEDICARE OP): Performed by: NURSE PRACTITIONER

## 2025-06-26 PROCEDURE — 76937 US GUIDE VASCULAR ACCESS: CPT | Performed by: INTERNAL MEDICINE

## 2025-06-26 PROCEDURE — 7100000002 HC RECOVERY ROOM TIME - EACH INCREMENTAL 1 MINUTE: Performed by: INTERNAL MEDICINE

## 2025-06-26 PROCEDURE — 85027 COMPLETE CBC AUTOMATED: CPT | Performed by: NURSE PRACTITIONER

## 2025-06-26 PROCEDURE — 36415 COLL VENOUS BLD VENIPUNCTURE: CPT | Performed by: NURSE PRACTITIONER

## 2025-06-26 PROCEDURE — 7100000010 HC PHASE TWO TIME - EACH INCREMENTAL 1 MINUTE: Performed by: INTERNAL MEDICINE

## 2025-06-26 PROCEDURE — 85730 THROMBOPLASTIN TIME PARTIAL: CPT | Performed by: NURSE PRACTITIONER

## 2025-06-26 PROCEDURE — 82947 ASSAY GLUCOSE BLOOD QUANT: CPT

## 2025-06-26 PROCEDURE — C1894 INTRO/SHEATH, NON-LASER: HCPCS | Performed by: INTERNAL MEDICINE

## 2025-06-26 PROCEDURE — 93662 INTRACARDIAC ECG (ICE): CPT | Performed by: INTERNAL MEDICINE

## 2025-06-26 PROCEDURE — 99223 1ST HOSP IP/OBS HIGH 75: CPT | Performed by: NURSE PRACTITIONER

## 2025-06-26 PROCEDURE — 99153 MOD SED SAME PHYS/QHP EA: CPT | Performed by: INTERNAL MEDICINE

## 2025-06-26 PROCEDURE — 80053 COMPREHEN METABOLIC PANEL: CPT | Performed by: NURSE PRACTITIONER

## 2025-06-26 PROCEDURE — G0269 OCCLUSIVE DEVICE IN VEIN ART: HCPCS | Performed by: INTERNAL MEDICINE

## 2025-06-26 PROCEDURE — 86850 RBC ANTIBODY SCREEN: CPT | Performed by: NURSE PRACTITIONER

## 2025-06-26 DEVICE — IMPLANTABLE DEVICE: Type: IMPLANTABLE DEVICE | Site: HEART | Status: FUNCTIONAL

## 2025-06-26 RX ORDER — LIDOCAINE HYDROCHLORIDE 20 MG/ML
INJECTION, SOLUTION INFILTRATION; PERINEURAL AS NEEDED
Status: DISCONTINUED | OUTPATIENT
Start: 2025-06-26 | End: 2025-06-26 | Stop reason: HOSPADM

## 2025-06-26 RX ORDER — MIDAZOLAM HYDROCHLORIDE 1 MG/ML
INJECTION INTRAMUSCULAR; INTRAVENOUS AS NEEDED
Status: DISCONTINUED | OUTPATIENT
Start: 2025-06-26 | End: 2025-06-26 | Stop reason: HOSPADM

## 2025-06-26 RX ORDER — HEPARIN SODIUM 1000 [USP'U]/ML
INJECTION, SOLUTION INTRAVENOUS; SUBCUTANEOUS AS NEEDED
Status: DISCONTINUED | OUTPATIENT
Start: 2025-06-26 | End: 2025-06-26 | Stop reason: HOSPADM

## 2025-06-26 RX ORDER — ONDANSETRON 4 MG/1
4 TABLET, FILM COATED ORAL EVERY 8 HOURS PRN
Status: DISCONTINUED | OUTPATIENT
Start: 2025-06-26 | End: 2025-06-26 | Stop reason: HOSPADM

## 2025-06-26 RX ORDER — ONDANSETRON HYDROCHLORIDE 2 MG/ML
4 INJECTION, SOLUTION INTRAVENOUS EVERY 8 HOURS PRN
Status: DISCONTINUED | OUTPATIENT
Start: 2025-06-26 | End: 2025-06-26 | Stop reason: HOSPADM

## 2025-06-26 RX ORDER — CEFAZOLIN 1 G/1
INJECTION, POWDER, FOR SOLUTION INTRAVENOUS AS NEEDED
Status: DISCONTINUED | OUTPATIENT
Start: 2025-06-26 | End: 2025-06-26 | Stop reason: HOSPADM

## 2025-06-26 RX ORDER — FENTANYL CITRATE 50 UG/ML
INJECTION, SOLUTION INTRAMUSCULAR; INTRAVENOUS AS NEEDED
Status: DISCONTINUED | OUTPATIENT
Start: 2025-06-26 | End: 2025-06-26 | Stop reason: HOSPADM

## 2025-06-26 RX ORDER — ACETAMINOPHEN 325 MG/1
650 TABLET ORAL EVERY 6 HOURS PRN
Status: DISCONTINUED | OUTPATIENT
Start: 2025-06-26 | End: 2025-06-26 | Stop reason: HOSPADM

## 2025-06-26 RX ORDER — CEFAZOLIN SODIUM 2 G/50ML
2 SOLUTION INTRAVENOUS ONCE
Status: DISCONTINUED | OUTPATIENT
Start: 2025-06-26 | End: 2025-06-26 | Stop reason: HOSPADM

## 2025-06-26 RX ORDER — NAPROXEN SODIUM 220 MG/1
324 TABLET, FILM COATED ORAL ONCE
Status: COMPLETED | OUTPATIENT
Start: 2025-06-26 | End: 2025-06-26

## 2025-06-26 RX ORDER — PROTAMINE SULFATE 10 MG/ML
INJECTION, SOLUTION INTRAVENOUS CONTINUOUS PRN
Status: COMPLETED | OUTPATIENT
Start: 2025-06-26 | End: 2025-06-26

## 2025-06-26 RX ADMIN — ASPIRIN 324 MG: 81 TABLET, CHEWABLE ORAL at 10:16

## 2025-06-26 ASSESSMENT — PAIN SCALES - GENERAL
PAINLEVEL_OUTOF10: 0 - NO PAIN

## 2025-06-26 ASSESSMENT — COLUMBIA-SUICIDE SEVERITY RATING SCALE - C-SSRS
2. HAVE YOU ACTUALLY HAD ANY THOUGHTS OF KILLING YOURSELF?: NO
6. HAVE YOU EVER DONE ANYTHING, STARTED TO DO ANYTHING, OR PREPARED TO DO ANYTHING TO END YOUR LIFE?: NO
1. IN THE PAST MONTH, HAVE YOU WISHED YOU WERE DEAD OR WISHED YOU COULD GO TO SLEEP AND NOT WAKE UP?: NO

## 2025-06-26 ASSESSMENT — ENCOUNTER SYMPTOMS
SHORTNESS OF BREATH: 0
EYES NEGATIVE: 1
PSYCHIATRIC NEGATIVE: 1
NEUROLOGICAL NEGATIVE: 1
GASTROINTESTINAL NEGATIVE: 1
CHEST TIGHTNESS: 0
HEMATOLOGIC/LYMPHATIC NEGATIVE: 1
RESPIRATORY NEGATIVE: 1
LIGHT-HEADEDNESS: 0
MUSCULOSKELETAL NEGATIVE: 1
ALLERGIC/IMMUNOLOGIC NEGATIVE: 1
CARDIOVASCULAR NEGATIVE: 1
CONSTITUTIONAL NEGATIVE: 1
ENDOCRINE NEGATIVE: 1
DIZZINESS: 0
PALPITATIONS: 0
WEAKNESS: 0

## 2025-06-26 ASSESSMENT — PAIN - FUNCTIONAL ASSESSMENT: PAIN_FUNCTIONAL_ASSESSMENT: 0-10

## 2025-06-26 NOTE — H&P
History Of Present Illness  Maira Shearer is a 80 y.o. female with past medical history significant for paroxysmal atrial fibrillation, maintained on amiodarone 100 mg daily, metoprolol tartrate 50 mg twice daily and anticoagulated with Eliquis 5 mg twice daily, chronic diastolic HF, SND, s/p remote PPM, HTN, HLD, DM 2, hypothyroidism, CKD 3, obesity and was referred to Dr. Barrientos by Dr. Baker for evaluation of left atrial appendage closure.  Patient has a history of lower GI bleeding and anemia as well as recurrent falls.  Her CHADS-VASC score 6 and HAS-BLED score is 5.  She is at increased risk for both bleeding and stroke and is a reasonable candidate for left atrial appendage occluder placement.  Patient is at [facility] today for this procedure under the care of Dr. Barrientos.     Past Medical History  Medical History[1]    Surgical History  Surgical History[2]     Social History    Never a smoker  Denies alcohol use  Denies drug use    Family History  Family History[3]     Allergies  Fenofibrate, Oxycodone-acetaminophen, and Metformin    Review of Systems   Constitutional: Negative.    HENT: Negative.     Eyes: Negative.    Respiratory: Negative.  Negative for chest tightness and shortness of breath.    Cardiovascular: Negative.  Negative for chest pain and palpitations.   Gastrointestinal: Negative.    Endocrine: Negative.    Genitourinary: Negative.    Musculoskeletal: Negative.    Skin: Negative.    Allergic/Immunologic: Negative.    Neurological: Negative.  Negative for dizziness, weakness and light-headedness.   Hematological: Negative.    Psychiatric/Behavioral: Negative.       Physical Exam  Vitals reviewed.   Constitutional:       Appearance: Normal appearance. She is obese.   HENT:      Head: Normocephalic and atraumatic.      Nose: Nose normal.      Mouth/Throat:      Mouth: Mucous membranes are moist.      Pharynx: Oropharynx is clear.   Eyes:      Pupils: Pupils are equal,  "round, and reactive to light.   Cardiovascular:      Rate and Rhythm: Normal rate and regular rhythm.      Pulses: Normal pulses.      Heart sounds: Normal heart sounds.   Pulmonary:      Effort: Pulmonary effort is normal.      Breath sounds: Normal breath sounds.   Abdominal:      General: Bowel sounds are normal.      Palpations: Abdomen is soft.   Musculoskeletal:         General: Normal range of motion.      Cervical back: Normal range of motion and neck supple.   Skin:     General: Skin is warm and dry.   Neurological:      General: No focal deficit present.      Mental Status: She is alert and oriented to person, place, and time.   Psychiatric:         Mood and Affect: Mood normal.         Behavior: Behavior normal.       Last Recorded Vitals  Blood pressure 144/78, pulse 59, temperature 35.9 °C (96.6 °F), temperature source Temporal, resp. rate 20, height (!) 1.549 m (5' 1\"), weight 86.3 kg (190 lb 4.1 oz), SpO2 94%.    Relevant Results  Results for orders placed or performed during the hospital encounter of 06/26/25 (from the past 24 hours)   Comprehensive Metabolic Panel   Result Value Ref Range    Glucose 246 (H) 74 - 99 mg/dL    Sodium 139 136 - 145 mmol/L    Potassium 3.6 3.5 - 5.3 mmol/L    Chloride 95 (L) 98 - 107 mmol/L    Bicarbonate 33 (H) 21 - 32 mmol/L    Anion Gap 15 10 - 20 mmol/L    Urea Nitrogen 21 6 - 23 mg/dL    Creatinine 1.45 (H) 0.50 - 1.05 mg/dL    eGFR 36 (L) >60 mL/min/1.73m*2    Calcium 8.7 8.6 - 10.3 mg/dL    Albumin 4.3 3.4 - 5.0 g/dL    Alkaline Phosphatase 162 (H) 33 - 136 U/L    Total Protein 7.5 6.4 - 8.2 g/dL    AST 32 9 - 39 U/L    Bilirubin, Total 1.7 (H) 0.0 - 1.2 mg/dL    ALT 24 7 - 45 U/L   CBC   Result Value Ref Range    WBC 7.1 4.4 - 11.3 x10*3/uL    nRBC 0.0 0.0 - 0.0 /100 WBCs    RBC 3.89 (L) 4.00 - 5.20 x10*6/uL    Hemoglobin 13.3 12.0 - 16.0 g/dL    Hematocrit 39.2 36.0 - 46.0 %     (H) 80 - 100 fL    MCH 34.2 (H) 26.0 - 34.0 pg    MCHC 33.9 32.0 - 36.0 g/dL "    RDW 13.9 11.5 - 14.5 %    Platelets 142 (L) 150 - 450 x10*3/uL   Coagulation Screen   Result Value Ref Range    Protime 15.3 (H) 9.8 - 12.4 seconds    INR 1.4 (H) 0.9 - 1.1    aPTT 36 26 - 36 seconds   ECG 12 lead   Result Value Ref Range    Ventricular Rate 59 BPM    Atrial Rate 59 BPM    NJ Interval 416 ms    QRS Duration 90 ms    QT Interval 292 ms    QTC Calculation(Bazett) 289 ms    P Axis 71 degrees    R Axis 69 degrees    T Axis 237 degrees    QRS Count 10 beats    Q Onset 213 ms    P Onset 6 ms    P Offset 69 ms    T Offset 359 ms    QTC Fredericia 290 ms      Cardiology, Vascular, and Other Imaging  ECG 12 lead  Result Date: 6/26/2025  Atrial-paced rhythm with prolonged AV conduction ST & T wave abnormality, consider anterolateral ischemia Abnormal ECG When compared with ECG of 15-NOV-2024 08:26, Electronic atrial pacemaker has replaced Electronic ventricular pacemaker Vent. rate has decreased BY  31 BPM       Assessment/Plan   Paroxysmal atrial fibrillation   - Maintained on amiodarone 100 mg daily, metoprolol tartrate 50 mg twice daily and anticoagulated with Eliquis 5 mg twice daily (last dose of Eliquis 6/22/2025)   - CHADS-VASC score of 6, HAS-BLED score of 5.  2.   Frequent Falls  3.   SND - s/p remote PPM  4.   Benign Essential HTN - Stable  5.   Chronic Diastolic Heart Failure  6.   Dyslipidemia  7.   CKD 3  8.   Obesity - BMI 36  9.   DM 2  10.  Hypothyroidism    Placement of a Watchman LAAO device today under the care of Dr. Barrientos.  Further recommendations pending procedure results.    I spent 75 minutes in the professional and overall care of this patient.      Candice Donato, APRN-CNP          [1]   Past Medical History:  Diagnosis Date    Arrhythmia     Diabetes mellitus (Multi)     Encounter for full-term uncomplicated delivery     Normal vaginal delivery    Encounter for gynecological examination (general) (routine) without abnormal findings 09/01/2020    Encounter for routine  gynecological examination    Hypertension     Other conditions influencing health status     Menstruation    Other fecal abnormalities     Stool guaiac positive    Personal history of other diseases of the circulatory system     History of CHF (congestive heart failure)    Personal history of other medical treatment 10/06/2021    H/O mammogram   [2]   Past Surgical History:  Procedure Laterality Date    CARDIAC CATHETERIZATION N/A 2/6/2025    Procedure: Left And Right Heart Cath, With LV;  Surgeon: Benjamin Baker MD;  Location: [facility] Cardiac Cath Lab;  Service: Cardiovascular;  Laterality: N/A;  8:30. CKD    CT ABDOMEN PELVIS ANGIOGRAM W AND/OR WO IV CONTRAST  07/21/2023    CT ABDOMEN PELVIS ANGIOGRAM W AND/OR WO IV CONTRAST 7/21/2023 [facility] CT    INSERT / REPLACE / REMOVE PACEMAKER      OTHER SURGICAL HISTORY  10/17/2019    Throat surgery    OTHER SURGICAL HISTORY  10/17/2019    Cholecystectomy    OTHER SURGICAL HISTORY  12/12/2019    Shoulder surgery    OTHER SURGICAL HISTORY  02/19/2020    Knee surgery    OTHER SURGICAL HISTORY  09/22/2020    Knee replacement    OTHER SURGICAL HISTORY  12/12/2019    Carpal tunnel surgery    OTHER SURGICAL HISTORY  12/12/2019    Thyroidectomy    OTHER SURGICAL HISTORY  12/12/2019    Cataract surgery   [3]   Family History  Problem Relation Name Age of Onset    Diabetes Mother      Hypertension Mother      Diabetes Father      Hypertension Father

## 2025-06-26 NOTE — NURSING NOTE
Pt stated she was concerned of having to urinate while flat. Offered pt a Purewick external cath, pt agreed. Pt thierno area was cleaned up from venous puncture from the procedure.  Pt had her underwear pulled down which were bloody, pt stated to throw them away. Purewick placed per protocol with pt offering no complaints. Call light remains in easy reach.

## 2025-06-26 NOTE — NURSING NOTE
Printed instructions reviewed with pt and daughter including: restrictions post sedation, activity/groin restrictions including groin care and s/s of bleeding/hematoma and what to do if it occurs. Home going meds and follow up appts reviewed.  Pt strongly advised to review all printed instructions and if there are any urgent concerns after office hours, contact number highlighted on last page with pt and daughter aware. R groin site soft with some spots of bruising noted but no s/s of any active bleeding or hematoma noted, drsg D&I.Pt verbalized understanding of instructions and offers no complaints at this time.

## 2025-06-26 NOTE — DISCHARGE INSTRUCTIONS
Watchman Discharge Instructions    Anticoagulation Plan:    Please resume your Eliquis at 2.5 mg twice daily x 3 months post Watchman, then STOP.  You may cut your current Eliquis 5 mg tablets in half and take a half a tablet twice daily.  Then start Aspirin 81 mg daily for life the day after your last dose of Eliquis.  You will have a 3 month CTA to assess the effectiveness of the Watchman Device.     General Instructions:  DO NOT drink any alcoholic drinks or take any non-prescriptive medications that contain alcohol for the first 24 hours.   DO NOT make any important decisions for the first 24 hours.  Activity:  You are advised to go directly home from the hospital.   DO NOT lift anything heavier than 10 pounds for one week, this allows for proper healing of the groin.   No excessive exercise or treadmill use for one week. You may walk and do stairs, slowly.   No sexual activities for 24 hours after you arrive home.    Wound Care:  If slight bleeding should occur at site, lie down and have someone apply firm pressure just above the puncture site for 5 minutes.  If it continues or is profuse, call 911. Always notify your doctor if bleeding occurs.   Keep site clean and dry. Let air dry or you may use a simple bandaid.   Gently cleanse the puncture site in your groin with soap and water only.   You may experience some tenderness, bruising or minimal inflammation.  If you have any concerns, you may contact the Cath Lab or if any of these symptoms become excessive, contact your cardiologist or go to the emergency room.   No tub baths, soaking, or swimming for one week.   May shower the next day after your procedure.    Diet:  You may resume your normal diet. However it is better to start with liquids such as juices then soup and crackers, and gradually work up to solid foods.    Other Instructions:  If you develop difficulty breathing, rash, hives, severe nausea, vomiting, light-headedness or any signs of infection,  immediately contact your doctor and go to the nearest emergency room.   You must take your aspirin, clopidogrel (Plavix), prasugrel (Effient), or ticagrelor (Brilinta) every day without missing a single dose.  If you are getting low on these medications, contact your physician immediately for a refill.  - CALL 911 IF YOU HAVE ANY OF THE SIGNS AND SYMPTOMS OF HEART FAILURE: 1. Chest pain 2. Significant Shortness of breath 3. Fainting.     Call Provider If (Home-going Patients):  Breathing faster than normal.   Breathing harder than normal or having retractions.   Fever of 100.4 F (38 C) or higher.   Chills.   Drinking less than normal.   Urinating less than normal, over 1 day.   Acting very sleepy and difficult to awaken.   Vomiting (throwing up) and not able to eat or drink for 12 hours.   3 or more loose, watery bowel movements in 24 hours (diarrhea).   Any new concerning symptoms.    Please call the STRUCTURAL HEART TEAM LINE if you have any questions or concerns - [phone]   [facility] nurse coordinator Clementina Andrews's phone is [phone].

## 2025-06-26 NOTE — DISCHARGE SUMMARY
STRUCTURAL HEART INPATIENT DISCHARGE SUMMARY      Admitting Provider: Blake Barrientos MD  Discharge Provider: Blake Barrientos MD  Primary Care Physician at Discharge: Jeremias Galicia, APRN--[phone]     Admission Date: 6/26/2025     Discharge Date: 6/26/2025    Primary Discharge Diagnosis  Atrial fibrillation (Multi)    History of Present Illness  Maira Shearer is a 80 y.o. female with past medical history significant for paroxysmal atrial fibrillation, maintained on amiodarone 100 mg daily, metoprolol tartrate 50 mg twice daily and anticoagulated with Eliquis 5 mg twice daily, chronic diastolic HF, SND, s/p remote PPM, HTN, HLD, DM 2, hypothyroidism, CKD 3, obesity and was referred to Dr. Barrientos by Dr. Baker for evaluation of left atrial appendage closure.  Patient presented to [facility] on 6[facility]26/2025 for elective placement of a Watchman LAAO device.    Hospital Course  Maira Shearer is stable status post successful placement of a 27 mm Watchman LAAO device via RFV x 2 Pro-glide closure devices on 6/26/2025 under the care of Dr. Barrientos.  The patient tolerated the procedure well and there were no postprocedural complications.  Please see procedural report for complete details.  Right groin procedural access sites remained stable, soft without signs of active bleeding or hematoma.  Patient denies complaints of chest pain, shortness of breath, abdominal pain, groin pain, numbness or tingling in the extremities.  She has been up ambulating without difficulty after her bedrest/recovery.  Postop echocardiogram reveals a stable EF and no significant change of the pericardial space.    Plan  - Patient to be discharged to home today, 6/26/2025 per Dr. Barrientos.  -Will resume Eliquis at a lower dose of 2.5 mg twice daily x 3 months followed by aspirin 81 mg daily for life.    - Continue all other home medications as previously taking.  - Outpatient follow-up with cardiologist, Dr. Baker has been  "arranged.  - Repeat Watchman CTA in 3 months to reassess the device.  - Watchman RN coordinator to follow-up per protocol.    Physical Exam at Discharge  Discharge Condition: good  Heart Rate: 60  Resp: 18  BP: 151/78  Temp: 35.9 °C (96.6 °F)  Weight: 86.3 kg (190 lb 4.1 oz)     Pertinent Physical Exam At Time of Discharge  Physical Exam  Vitals reviewed.   Constitutional:       Appearance: Normal appearance. She is obese.   HENT:      Head: Normocephalic and atraumatic.      Nose: Nose normal.      Mouth/Throat:      Mouth: Mucous membranes are moist.      Pharynx: Oropharynx is clear.   Eyes:      Pupils: Pupils are equal, round, and reactive to light.   Cardiovascular:      Rate and Rhythm: Normal rate and regular rhythm.      Pulses: Normal pulses.      Heart sounds: Normal heart sounds.   Pulmonary:      Effort: Pulmonary effort is normal.      Breath sounds: Normal breath sounds.   Abdominal:      General: Bowel sounds are normal.      Palpations: Abdomen is soft.   Musculoskeletal:         General: Normal range of motion.      Cervical back: Normal range of motion and neck supple.   Skin:     General: Skin is warm and dry.      Comments: Right groin procedural access site is intact, soft with no bleeding/hematoma or bruit.   Neurological:      General: No focal deficit present.      Mental Status: She is alert and oriented to person, place, and time.   Psychiatric:         Mood and Affect: Mood normal.         Behavior: Behavior normal.       Home Medications     Medication List      CONTINUE taking these medications     acetaminophen 325 mg chewable tablet   allopurinol 100 mg tablet; Commonly known as: Zyloprim; Take 1 tablet   (100 mg) by mouth once daily.   apixaban 5 mg tablet; Commonly known as: Eliquis; Take 0.5 tablets (2.5   mg) by mouth 2 times a day.   BD Ultra-Fine Micro Pen Needle 32 gauge x 1/4\" needle; Generic drug: pen   needle, diabetic; Up to three times daily   bumetanide 2 mg tablet; " Commonly known as: Bumex; Take 1 tablet (2 mg)   by mouth once daily. Take a second tablet if your weight goes up by 2   pounds.   calcium polycarbophiL 625 mg tablet; Commonly known as: Fibercon   dapagliflozin propanediol 10 mg tablet; Commonly known as: Farxiga; Take   1 tablet (10 mg) by mouth once every 24 hours.   FreeStyle Alannah 2 [place] misc; Generic drug: flash glucose scanning   reader; Use as instructed   FreeStyle Alannah 2 Sensor kit; Generic drug: flash glucose sensor kit;   Use as instructed   insulin lispro protamin-lispro 100 unit/mL (75-25) pen; Commonly known   as: HumaLOG Mix 75-25 KwikPen; INJECT 15 UNITS SUBCUTANEOUSLY IN THE   MORNING and 15 UNITS NIGHTLY   levothyroxine 125 mcg tablet; Commonly known as: Synthroid, Levoxyl;   Take 1 tablet (125 mcg) by mouth early in the morning.. Take on an empty   stomach at the same time each day, either 30 to 60 minutes prior to   breakfast   lisinopril 40 mg tablet; Take 1 tablet (40 mg) by mouth once daily.   loratadine 10 mg tablet; Commonly known as: Claritin   lovastatin 20 mg tablet; Commonly known as: Mevacor; Take 1 tablet (20   mg) by mouth once daily at bedtime.   metoprolol tartrate 50 mg tablet; Commonly known as: Lopressor; Take 1   tablet by mouth 2 times a day.   pantoprazole 40 mg EC tablet; Commonly known as: ProtoNix   potassium chloride CR 20 mEq ER tablet; Commonly known as: Klor-Con M20;   Take 1 tablet (20 mEq) by mouth 3 times a day.   sennosides-docusate sodium 8.6-50 mg tablet; Commonly known as:   Melissa-Colace       Outpatient Follow-Up  Future Appointments   Date Time Provider Department Center   7/1/2025 11:00 AM [place] [id number] [facility] [address]   7/10/2025  1:00 PM Benjamin Baker MD [id number] [facility]   7/11/2025 11:00 AM PAULIE Stahl-CNP, [facility] [id number] [facility]   9/16/2025 11:00 AM Benjamin Baker MD [id number] [facility]   9/23/2025 11:30 AM MARIPOSA CASAREZ [facility] [id number] [facility]    9/26/2025  1:00 PM ELY [id number] CT 1 [id number] [facility]       Candice Donato, PAULIE-CNP

## 2025-06-26 NOTE — NURSING NOTE
Pt arrived from cath lab, flat lying with cath lab RN. R groin site soft, drsg dry with faint blood smear that does not change when pressed. Pt A&O, denies any c/o groin or back pain or discomfort. Frequent V.S. and telemetry initiated with call light placed in easy reach. S/s of groin site bleeding/hematoma reviewed with pt instructed to contact staff ASAP if this occurs. Pt was concerned about her blood glucose being low, result was 180. Pt aware of bedrest time and ETA of discharge. Pt offers no further questions a this time.

## 2025-06-26 NOTE — PRE-SEDATION DOCUMENTATION
Patient: Maira Shearer  MRN: [id number]    NPO guidelines met: Yes    Physical Exam    Airway  Mallampati: III     Cardiovascular    Dental    Pulmonary        Plan    ASA 3     Mild

## 2025-06-26 NOTE — POST-PROCEDURE NOTE
Physician Transition of Care Summary  Invasive Cardiovascular Lab    Procedure Date: 6/26/2025  Attending:    * lBake Barrientos - Primary  Resident/Fellow/Other Assistant: Surgeons and Role:  * No surgeons found with a matching role *    Indications:   Pre-op Diagnosis      * Atrial fibrillation, unspecified type (Multi) [I48.91]    Post-procedure diagnosis:   Post-op Diagnosis     * Atrial fibrillation, unspecified type (Multi) [I48.91]    Procedure(s):   LAAO (Left Atrial Appendage Occlusion)  42653 - OR PERQ CLSR TCAT L ATR APNDGE W/ENDOCARDIAL IMPLNT    OR PERQ CLSR TCAT L ATR APNDGE W/ENDOCARDIAL IMPLNT [00209]    Description of the Procedure:   S/p MIKAELA closure    Access: Dual right femoral vein access s/p 2 proglide closure devices.     Device: After confirmation of the device position on fluoroscopy and ICE, anchor with a tug test, compression and seal a 27 mm Watchman was successfully deployed without any immediate complications.     Mild effusion on ICE was present pre and post watchman deployment.     Complications:   none    Stents/Implants:   Implants          Other Cardiac Implant          Device, Closure, 27mm Watchman Flx Pro Laac - [id number] - Implanted        Inventory item: DEVICE, CLOSURE, 27MM WATCHMAN FLX PRO LAAC Model/Cat number: [id number]    : [employer] Lot number: [id number]    Device identifier: [id number]        GUDID Information       Request status Successful        Brand name: WATCHMAN FLX™ Pro Version/Model: [id number]    Company name: [employer] MRI safety info as of 6/26/25: MR Conditional    Contains dry or latex rubber: No      GMDN P.T. name: Cardiac defect occluder                As of 6/26/2025       Status: Implanted                              Anticoagulation/Antiplatelet Plan:   DOAC    Estimated Blood Loss:   10 mL    Anesthesia: Moderate Sedation Anesthesia Staff: No anesthesia staff entered.    Any Specimen(s) Removed:    Order Name Source Comment Collection Info Order Time   TYPE AND SCREEN Blood, Venous  Collected By: Avis Grimaldo RN 6/26/2025  9:26 AM     Release result to [facility]   Immediate        COMPREHENSIVE METABOLIC PANEL Blood, Venous  Collected By: Avis Grimaldo RN 6/26/2025  9:26 AM     Release result to [facility]   Immediate        CBC Blood, Venous  Collected By: Avis Grimaldo RN 6/26/2025  9:26 AM     Release result to [facility]   Immediate        COAGULATION SCREEN Blood, Venous  Collected By: Avis Grimaldo RN 6/26/2025  9:26 AM     Release result to [facility]   Immediate            Disposition:   Eliquis 2.5mg/ BID for 3 months followed by ASA for life   CT in 3 months  Access site monitoring.   TTE today  Likely discharge home today once recovery and monitoring period is complete.       Electronically signed by: Solo Salgado MD, 6/26/2025 1:43 PM   Detail Level: Detailed Detail Level: Zone

## 2025-06-26 NOTE — Clinical Note
Versacross sheath removed and exchanged with the double curve sheath. Dorsal Nasal Flap Text: The defect edges were debeveled with a #15 scalpel blade.  Given the location of the defect and the proximity to free margins a dorsal nasal flap was deemed most appropriate.  Using a sterile surgical marker, an appropriate dorsal nasal flap was drawn around the defect.    The area thus outlined was incised deep to adipose tissue with a #15 scalpel blade.  The skin margins were undermined to an appropriate distance in all directions utilizing iris scissors.

## 2025-06-27 ENCOUNTER — APPOINTMENT (OUTPATIENT)
Dept: NEPHROLOGY | Facility: CLINIC | Age: 80
End: 2025-06-27
Payer: COMMERCIAL

## 2025-06-27 VITALS
BODY MASS INDEX: 35.92 KG/M2 | WEIGHT: 190.26 LBS | OXYGEN SATURATION: 99 % | HEIGHT: 61 IN | SYSTOLIC BLOOD PRESSURE: 151 MMHG | DIASTOLIC BLOOD PRESSURE: 78 MMHG | TEMPERATURE: 96.6 F | RESPIRATION RATE: 18 BRPM | HEART RATE: 60 BPM

## 2025-06-27 LAB
ATRIAL RATE: 60 BPM
PR INTERVAL: 452 MS
Q ONSET: 229 MS
QRS COUNT: 10 BEATS
QRS DURATION: 90 MS
QT INTERVAL: 514 MS
QTC CALCULATION(BAZETT): 514 MS
QTC FREDERICIA: 514 MS
R AXIS: 58 DEGREES
T AXIS: 211 DEGREES
T OFFSET: 486 MS
VENTRICULAR RATE: 60 BPM

## 2025-06-27 PROCEDURE — 93005 ELECTROCARDIOGRAM TRACING: CPT

## 2025-07-04 LAB
ANION GAP SERPL CALCULATED.4IONS-SCNC: 14 MMOL/L (CALC) (ref 7–17)
BUN SERPL-MCNC: 19 MG/DL (ref 7–25)
BUN/CREAT SERPL: 12 (CALC) (ref 6–22)
CALCIUM SERPL-MCNC: 8 MG/DL (ref 8.6–10.4)
CHLORIDE SERPL-SCNC: 94 MMOL/L (ref 98–110)
CO2 SERPL-SCNC: 34 MMOL/L (ref 20–32)
CREAT SERPL-MCNC: 1.61 MG/DL (ref 0.6–0.95)
EGFRCR SERPLBLD CKD-EPI 2021: 32 ML/MIN/1.73M2
GLUCOSE SERPL-MCNC: 170 MG/DL (ref 65–99)
POTASSIUM SERPL-SCNC: 3.8 MMOL/L (ref 3.5–5.3)
SODIUM SERPL-SCNC: 142 MMOL/L (ref 135–146)
URATE SERPL-MCNC: 6.7 MG/DL (ref 2.5–7)

## 2025-07-10 ENCOUNTER — APPOINTMENT (OUTPATIENT)
Dept: CARDIOLOGY | Facility: CLINIC | Age: 80
End: 2025-07-10
Payer: COMMERCIAL

## 2025-07-10 ENCOUNTER — OFFICE VISIT (OUTPATIENT)
Dept: NEPHROLOGY | Facility: CLINIC | Age: 80
End: 2025-07-10
Payer: COMMERCIAL

## 2025-07-10 VITALS
SYSTOLIC BLOOD PRESSURE: 140 MMHG | HEART RATE: 65 BPM | BODY MASS INDEX: 37.27 KG/M2 | WEIGHT: 197.4 LBS | DIASTOLIC BLOOD PRESSURE: 80 MMHG | OXYGEN SATURATION: 95 % | HEIGHT: 61 IN

## 2025-07-10 DIAGNOSIS — E78.2 MIXED HYPERLIPIDEMIA: ICD-10-CM

## 2025-07-10 DIAGNOSIS — N18.32 STAGE 3B CHRONIC KIDNEY DISEASE (MULTI): Primary | ICD-10-CM

## 2025-07-10 DIAGNOSIS — N18.32 TYPE 2 DIABETES MELLITUS WITH STAGE 3B CHRONIC KIDNEY DISEASE, WITH LONG-TERM CURRENT USE OF INSULIN (MULTI): ICD-10-CM

## 2025-07-10 DIAGNOSIS — I50.32 CHRONIC DIASTOLIC (CONGESTIVE) HEART FAILURE: ICD-10-CM

## 2025-07-10 DIAGNOSIS — E11.22 TYPE 2 DIABETES MELLITUS WITH STAGE 3B CHRONIC KIDNEY DISEASE, WITH LONG-TERM CURRENT USE OF INSULIN (MULTI): ICD-10-CM

## 2025-07-10 DIAGNOSIS — E79.0 HYPERURICEMIA: ICD-10-CM

## 2025-07-10 DIAGNOSIS — I10 PRIMARY HYPERTENSION: ICD-10-CM

## 2025-07-10 DIAGNOSIS — Z79.4 TYPE 2 DIABETES MELLITUS WITH STAGE 3B CHRONIC KIDNEY DISEASE, WITH LONG-TERM CURRENT USE OF INSULIN (MULTI): ICD-10-CM

## 2025-07-10 PROCEDURE — 1159F MED LIST DOCD IN RCRD: CPT | Performed by: CLINICAL NURSE SPECIALIST

## 2025-07-10 PROCEDURE — 1036F TOBACCO NON-USER: CPT | Performed by: CLINICAL NURSE SPECIALIST

## 2025-07-10 PROCEDURE — 3079F DIAST BP 80-89 MM HG: CPT | Performed by: CLINICAL NURSE SPECIALIST

## 2025-07-10 PROCEDURE — 3077F SYST BP >= 140 MM HG: CPT | Performed by: CLINICAL NURSE SPECIALIST

## 2025-07-10 PROCEDURE — 1111F DSCHRG MED/CURRENT MED MERGE: CPT | Performed by: CLINICAL NURSE SPECIALIST

## 2025-07-10 PROCEDURE — 1160F RVW MEDS BY RX/DR IN RCRD: CPT | Performed by: CLINICAL NURSE SPECIALIST

## 2025-07-10 PROCEDURE — 99213 OFFICE O/P EST LOW 20 MIN: CPT | Performed by: CLINICAL NURSE SPECIALIST

## 2025-07-10 RX ORDER — LOVASTATIN 20 MG/1
20 TABLET ORAL NIGHTLY
Qty: 90 TABLET | Refills: 3 | Status: SHIPPED | OUTPATIENT
Start: 2025-07-10

## 2025-07-10 RX ORDER — DAPAGLIFLOZIN 10 MG/1
10 TABLET, FILM COATED ORAL EVERY 24 HOURS
Qty: 90 TABLET | Refills: 3 | Status: SHIPPED | OUTPATIENT
Start: 2025-07-10 | End: 2026-07-10

## 2025-07-10 RX ORDER — AMIODARONE HYDROCHLORIDE 200 MG/1
1 TABLET ORAL
COMMUNITY
Start: 2025-07-02

## 2025-07-10 ASSESSMENT — ENCOUNTER SYMPTOMS
PSYCHIATRIC NEGATIVE: 1
NEUROLOGICAL NEGATIVE: 1
ENDOCRINE NEGATIVE: 1
EYES NEGATIVE: 1
GASTROINTESTINAL NEGATIVE: 1
CONSTITUTIONAL NEGATIVE: 1
SHORTNESS OF BREATH: 1
HEMATOLOGIC/LYMPHATIC NEGATIVE: 1
ALLERGIC/IMMUNOLOGIC NEGATIVE: 1
MUSCULOSKELETAL NEGATIVE: 1

## 2025-07-10 NOTE — ASSESSMENT & PLAN NOTE
On lisinopril at 40 mg daily, blood pressure is well-controlled, she is also watching it at home and it is well-controlled at home also running 130s to 140s systolically

## 2025-07-10 NOTE — PROGRESS NOTES
Subjective   Patient ID: Maira Shearer is a 80 y.o. female who presents for Follow-up (3 months/Review labs 7/3).  Patient being seen in follow-up for chronic kidney disease stage IIIb with history of diabetes and hypertension    Labs reviewed  Uric acid 6.7  Glucose 170  Renal function with BUN of 19 and creatinine of 1.61, GFR is 32  Sodium 142, potassium 3.8, chloride 94, bicarb 34  Calcium 8.0    She is doing well  She recently had a watchman placed, her daughter-in-law believes that maybe her Farxiga was placed on hold during this time it accidentally did not get restarted  She has not had a little bit of weight gain however her daughter-in-law also states she was told to increase her fluid intake after her procedure  She is not complaining of any increase in shortness of breath  Continues with some mild lower extremity edema        Review of Systems   Constitutional: Negative.    HENT: Negative.     Eyes: Negative.    Respiratory:  Positive for shortness of breath (With exertion).    Cardiovascular:  Positive for leg swelling.   Gastrointestinal: Negative.    Endocrine: Negative.    Genitourinary: Negative.    Musculoskeletal: Negative.    Skin: Negative.    Allergic/Immunologic: Negative.    Neurological: Negative.    Hematological: Negative.    Psychiatric/Behavioral: Negative.         Objective   Physical Exam  Constitutional:       Appearance: Normal appearance.   HENT:      Head: Normocephalic and atraumatic.      Mouth/Throat:      Mouth: Mucous membranes are moist.   Eyes:      Extraocular Movements: Extraocular movements intact.   Cardiovascular:      Rate and Rhythm: Normal rate and regular rhythm.      Heart sounds: Normal heart sounds.   Pulmonary:      Effort: Pulmonary effort is normal.      Breath sounds: Normal breath sounds.      Comments: On oxygen at 2 L per nasal cannula with exertion, room air at rest  Abdominal:      General: Bowel sounds are normal.      Palpations: Abdomen is soft.    Musculoskeletal:         General: Normal range of motion.      Right lower leg: Edema (Trace) present.      Left lower leg: Edema (Trace) present.   Skin:     General: Skin is warm and dry.   Neurological:      Mental Status: She is alert.   Psychiatric:         Behavior: Behavior normal.         Thought Content: Thought content normal.         Assessment/Plan   Problem List Items Addressed This Visit           ICD-10-CM    Primary hypertension I10    On lisinopril at 40 mg daily, blood pressure is well-controlled, she is also watching it at home and it is well-controlled at home also running 130s to 140s systolically         Stage 3b chronic kidney disease (Multi) - Primary N18.32    Renal function is stable with creatinine of 1.61, blood pressure is well-controlled, diabetes well-controlled, not currently on SGLT2 however her daughter believes it got held prior to her Watchman procedure and just accidentally did not get restarted, she will check the meds at home and make sure this is true, if needed I will call in another prescription         Relevant Orders    PTH, intact    Basic metabolic panel    Urinalysis with Reflex Microscopic    Follow Up In Nephrology    Albumin-Creatinine Ratio, Urine Random    Type 2 diabetes mellitus, with long-term current use of insulin E11.9, Z79.4    Hyperuricemia E79.0     Other Visit Diagnoses         Codes      Mixed hyperlipidemia     E78.2    Relevant Medications    lovastatin (Mevacor) 20 mg tablet        Calcium slightly low at this visit, will check PTH  CKD stage IIIb-with creatinine ranging from 1.6-1.8  History of SIADH/hyponatremia-sodium is stable  Hypothyroidism  Diabetes mellitus type 2-last hemoglobin A1c 7.4  Hypertension  Paroxysmal atrial fibrillation-Watchman device placed  Obesity  Chronic diarrhea  Metabolic alkalosis   Hypokalemia       PAULIE Stahl-ANISA, DNP 07/10/25 11:16 AM

## 2025-07-10 NOTE — ASSESSMENT & PLAN NOTE
Renal function is stable with creatinine of 1.61, blood pressure is well-controlled, diabetes well-controlled, not currently on SGLT2 however her daughter believes it got held prior to her Watchman procedure and just accidentally did not get restarted, she will check the meds at home and make sure this is true, if needed I will call in another prescription   Normal vision: sees adequately in most situations; can see medication labels, newsprint

## 2025-07-11 ENCOUNTER — OFFICE VISIT (OUTPATIENT)
Dept: CARDIOLOGY | Facility: CLINIC | Age: 80
End: 2025-07-11
Payer: COMMERCIAL

## 2025-07-11 ENCOUNTER — APPOINTMENT (OUTPATIENT)
Dept: CARDIOLOGY | Facility: CLINIC | Age: 80
End: 2025-07-11
Payer: COMMERCIAL

## 2025-07-11 ENCOUNTER — APPOINTMENT (OUTPATIENT)
Dept: NEPHROLOGY | Facility: CLINIC | Age: 80
End: 2025-07-11
Payer: COMMERCIAL

## 2025-07-11 VITALS
WEIGHT: 192.2 LBS | HEIGHT: 61 IN | OXYGEN SATURATION: 95 % | SYSTOLIC BLOOD PRESSURE: 130 MMHG | BODY MASS INDEX: 36.29 KG/M2 | HEART RATE: 67 BPM | DIASTOLIC BLOOD PRESSURE: 72 MMHG

## 2025-07-11 DIAGNOSIS — I48.0 PAROXYSMAL ATRIAL FIBRILLATION (MULTI): ICD-10-CM

## 2025-07-11 DIAGNOSIS — I48.21 PERMANENT ATRIAL FIBRILLATION (MULTI): ICD-10-CM

## 2025-07-11 DIAGNOSIS — Z95.818 PRESENCE OF OTHER CARDIAC IMPLANTS AND GRAFTS: ICD-10-CM

## 2025-07-11 DIAGNOSIS — I48.91 ATRIAL FIBRILLATION, UNSPECIFIED TYPE (MULTI): ICD-10-CM

## 2025-07-11 DIAGNOSIS — I10 PRIMARY HYPERTENSION: ICD-10-CM

## 2025-07-11 DIAGNOSIS — I44.2 ATRIOVENTRICULAR BLOCK, COMPLETE (MULTI): ICD-10-CM

## 2025-07-11 DIAGNOSIS — R06.02 SHORTNESS OF BREATH: ICD-10-CM

## 2025-07-11 DIAGNOSIS — Z95.0 PACEMAKER: Primary | ICD-10-CM

## 2025-07-11 DIAGNOSIS — I50.32 CHRONIC DIASTOLIC HEART FAILURE: ICD-10-CM

## 2025-07-11 DIAGNOSIS — E78.1 HYPERTRIGLYCERIDEMIA: ICD-10-CM

## 2025-07-11 PROCEDURE — 1036F TOBACCO NON-USER: CPT | Performed by: STUDENT IN AN ORGANIZED HEALTH CARE EDUCATION/TRAINING PROGRAM

## 2025-07-11 PROCEDURE — 1111F DSCHRG MED/CURRENT MED MERGE: CPT | Performed by: STUDENT IN AN ORGANIZED HEALTH CARE EDUCATION/TRAINING PROGRAM

## 2025-07-11 PROCEDURE — 3075F SYST BP GE 130 - 139MM HG: CPT | Performed by: STUDENT IN AN ORGANIZED HEALTH CARE EDUCATION/TRAINING PROGRAM

## 2025-07-11 PROCEDURE — 1160F RVW MEDS BY RX/DR IN RCRD: CPT | Performed by: STUDENT IN AN ORGANIZED HEALTH CARE EDUCATION/TRAINING PROGRAM

## 2025-07-11 PROCEDURE — 99215 OFFICE O/P EST HI 40 MIN: CPT | Performed by: STUDENT IN AN ORGANIZED HEALTH CARE EDUCATION/TRAINING PROGRAM

## 2025-07-11 PROCEDURE — 3078F DIAST BP <80 MM HG: CPT | Performed by: STUDENT IN AN ORGANIZED HEALTH CARE EDUCATION/TRAINING PROGRAM

## 2025-07-11 PROCEDURE — 1159F MED LIST DOCD IN RCRD: CPT | Performed by: STUDENT IN AN ORGANIZED HEALTH CARE EDUCATION/TRAINING PROGRAM

## 2025-07-11 RX ORDER — POLYETHYLENE GLYCOL 400 2.5 MG/ML
1 SOLUTION/ DROPS OPHTHALMIC 3 TIMES DAILY PRN
COMMUNITY

## 2025-07-11 NOTE — PROGRESS NOTES
Chief Complaint   Patient presents with    Watchman f/u     HPI:  I was requested by Dr. Galicia to evaluate this patient in consultation for cardiac assessment.     Patient 79-year-old female with a medical history of S/P PPM (11/2023), hypertension, hyperlipidemia, diabetes, atrial fibrillation S/P LAAO with Watchman device (06/2025),  who was admitted and seen by me at [facility] on 5/17/2022 for runs of atrial fibrillation with ambulation.      Patient returned previously felling well. However, she states that she increases her weight weekly, and if improves with additional dose of Bumex. She is also non-compliant with salt restriction - for example, diet rich in sausages.      Patient reports that she has been admitted in December 2024 in another hospital due to GI bleeding - hemorrhoids. She is back to [facility]. She will be schedule for Left Atrial Appendage Closure (LAAC) in [place].      Left Heart Catheterization (01/10/2025): Normal coronaries, preserved LV systolic function.    Patient has been admitted on 06/2025 at [facility] for elective placement of a Watchman LAAO device, successful procedure.    Past Medical History  Medical History[1]    Past Surgical History  Surgical History[2]    Past Family History  Family History[3]    Allergy History  Allergies[4]    Past Social History  Social History[5]    Tobacco Use History[6]    Objective Data:  Last Recorded Vitals:  There were no vitals filed for this visit.    Last Labs:  CBC - 6/26/2025:  9:30 AM  7.1 13.3 142    39.2      CMP - 7/3/2025:  6:23 AM  8.0 7.5 32 --- 1.7   _ 4.3 24 162      PTT - 6/26/2025:  9:30 AM  1.4   15.3 36     TROPHS   Date/Time Value Ref Range Status   07/21/2023 11:20 AM 26 0 - 13 ng/L Final     Comment:     .  Less than 99th percentile of normal range cutoff-  Female and children under 18 years old <14 ng/L; Male <21 ng/L: Negative  Repeat testing should be performed if clinically indicated.    .  Female and children under 18 years old 14-50 ng/L; Male 21-50 ng/L:  Consistent with possible cardiac damage and possible increased clinical   risk. Serial measurements may help to assess extent of myocardial damage.   .  >50 ng/L: Consistent with cardiac damage, increased clinical risk and  myocardial infarction. Serial measurements may help assess extent of   myocardial damage.   .   NOTE: Children less than 1 year old may have higher baseline troponin   levels and results should be interpreted in conjunction with the overall   clinical context.   .  NOTE: Troponin I testing is performed using a different   testing methodology at [facility] than at other   [facility]. Direct result comparisons should only   be made within the same method.       BNP   Date/Time Value Ref Range Status   09/06/2024 07:29  0 - 99 pg/mL Final   06/21/2024 09:15  0 - 99 pg/mL Final     HGBA1C   Date/Time Value Ref Range Status   01/31/2025 07:05 AM 7.4 <5.7 % of total Hgb Final     Comment:     For someone without known diabetes, a hemoglobin A1c  value of 6.5% or greater indicates that they may have   diabetes and this should be confirmed with a follow-up   test.     For someone with known diabetes, a value <7% indicates   that their diabetes is well controlled and a value   greater than or equal to 7% indicates suboptimal   control. A1c targets should be individualized based on   duration of diabetes, age, comorbid conditions, and   other considerations.     Currently, no consensus exists regarding use of  hemoglobin A1c for diagnosis of diabetes for children.         08/01/2024 07:23 AM 7.9 see below % Final   05/01/2024 11:53 AM 8.1 see below % Final     LDLCALC   Date/Time Value Ref Range Status   05/01/2024 11:53 AM 39 <=99 mg/dL Final     Comment:                                 Near   Borderline      AGE      Desirable  Optimal    High     High     Very High     0-19 Y     0 - 109     ---     110-129   >/= 130     ----    20-24 Y     0 - 119     ---    120-159   >/= 160     ----      >24 Y     0 -  99   100-129  130-159   160-189     >/=190       VLDL   Date/Time Value Ref Range Status   05/01/2024 11:53 AM 66 0 - 40 mg/dL Final   06/23/2023 07:25 AM 22 0 - 40 mg/dL Final   07/20/2022 07:25 AM 25 0 - 40 mg/dL Final   06/01/2021 12:05 PM 57 0 - 40 mg/dL Final        Patient Medications:  Encounter Medications[7]    Physical Exam:  General: alert, oriented and in no acute distress  HEENT: NC/AT; EOMI; PERRLA, external ear is normal  Neck: supple; trachea midline; no masses; no JVD  Chest: diminished breath sounds bilaterally; on NC oxygen  Cardio: regular rhythm, S1S2 normal, no murmurs; Pacemaker  Abdomen: Soft, non-tender, non-distension, no organomegaly  Extremities: Edema 1+/3+ LE b/l  Neuro: Grossly intact     Psychiatric: Normal mood and affect    Past Cardiology Results (Last 3 Years):  EKG:  ECG 12 lead daily 06/27/2025      ECG 12 lead 06/26/2025      ECG 12 lead (Clinic Performed) 03/11/2025      ECG 12 lead (Clinic Performed) 02/14/2025      ECG 12 lead (Clinic Performed) 01/10/2025      ECG 12 lead (Ancillary Performed) 11/15/2024      ECG 12 lead (Clinic Performed) 08/27/2024      ECG 12 lead (Clinic Performed) 12/04/2023    Echo:  Echo Results:  Transthoracic Echo (TTE) Limited 06/26/2025    [place] [address]  Tel [phone] Fax [phone]    TRANSTHORACIC ECHOCARDIOGRAM REPORT    Patient Name:       OG Hernandez Physician:    [id number] Tejada MD, [facility]  Study Date:         6/26/2025           Ordering Provider:    [id number] JULIETH STOKES  MRN/PID:            [id number]            Fellow:  Accession#:         [id number]        Nurse:  Date of Birth/Age:  1945 / 80 years Sonographer:          Marisel PEARLTA  Gender Assigned at  F                   Additional Staff:  Birth:  Height:             154.94 cm            Admit Date:           6/26/2025  Weight:             86.18 kg            Admission Status:     Outpatient  BSA / BMI:          1.85 m2 / 35.90     Department Location:  [address] [place] [address]  kg/m2  Blood Pressure: 146 /73 mmHg    Study Type:    TRANSTHORACIC ECHO (TTE) LIMITED  Diagnosis/ICD: Other specified postprocedural states-Z98.890; Presence of other  cardiac implants and grafts-Z95.818  Indication:    POST LAAO  CPT Codes:     Echo Limited-14200  Study Detail: The following Echo studies were performed: 2D. The patient was  awake.      PHYSICIAN INTERPRETATION:  Left Ventricle: The left ventricular systolic function is low normal with a visually estimated ejection fraction of 55%. There are no regional wall motion abnormalities. The left ventricular cavity size is upper limits of normal. Left ventricular diastolic filling was not assessed.  Left Atrium: The left atrial size was not assessed.  Right Ventricle: The right ventricle is mildly enlarged. There is low normal right ventricular systolic function.  Right Atrium: The right atrial size was not assessed.  Aortic Valve: The aortic valve was not assessed. Aortic valve regurgitation was not assessed.  Mitral Valve: The mitral valve was not assessed. Mitral valve regurgitation was not assessed.  Tricuspid Valve: The tricuspid valve was not assessed. Tricuspid regurgitation was not assessed.  Pulmonic Valve: The pulmonic valve was not assessed. The pulmonic valve regurgitation was not assessed.  Pericardium: Trivial pericardial effusion. Trivial clinically insignificant pericardial effusion unchanged from study prior to intervention.  Aorta: The aortic root was not assessed.      CONCLUSIONS:  1. The left ventricular systolic function is low normal with a visually estimated ejection fraction of 55%.  2. There is low normal right ventricular systolic function.  3. Mildly enlarged right ventricle.  4. Trivial clinically insignificant pericardial effusion unchanged from  study prior to intervention.    QUANTITATIVE DATA SUMMARY:    LV SYSTOLIC FUNCTION:  Normal Ranges:  EF-Visual:      55 %  LV EF Reported: 55 %      [id number] Eder Tejada MD, [facility]  Electronically signed on 6/26/2025 at 6:59:54 PM        ** Final **      Transthoracic Echo (TTE) Limited 06/26/2025    [place] [address]  Tel [phone] Fax [phone]    TRANSTHORACIC ECHOCARDIOGRAM REPORT    Patient Name:       OG JUAN FRANCISCO Hernandez Physician:    [id number] Roman Allen DO  Study Date:         6/26/2025           Ordering Provider:    [id number] JULIETH STOKES  MRN/PID:            [id number]            Fellow:  Accession#:         [id number]        Nurse:  Date of Birth/Age:  1945 / 80 years Sonographer:          Marisel PERALTA  Gender Assigned at  F                   Additional Staff:  Birth:  Height:             154.94 cm           Admit Date:           6/26/2025  Weight:             86.18 kg            Admission Status:     Outpatient  BSA / BMI:          1.85 m2 / 35.90     Department Location:  [address] [place] [address]  kg/m2  Blood Pressure: 150 /82 mmHg    Study Type:    TRANSTHORACIC ECHO (TTE) LIMITED  Diagnosis/ICD: Encounter for other preprocedural examination-Z01.818  Indication:    PRE LAAO  CPT Codes:     Echo Limited-68410  Study Detail: The following Echo studies were performed: 2D. The patient was  awake.      PHYSICIAN INTERPRETATION:  Left Ventricle: The left ventricular systolic function is low normal with a visually estimated ejection fraction of 55%. There are no regional wall motion abnormalities. The left ventricular cavity size was not assessed. Left ventricular diastolic filling was not assessed.  Left Atrium: The left atrial size is mild to moderately dilated.  Right Ventricle: The right ventricle is upper limits of normal in size. There is normal right ventricular global systolic function. A device is visualized in the right ventricle.  Right  Atrium: The right atrial size is mild to moderately dilated. There is a device visualized in the right atrium.  Aortic Valve: The aortic valve is trileaflet. Aortic valve regurgitation was not assessed.  Mitral Valve: The mitral valve is normal in structure. There is normal mitral valve leaflet mobility. There is mild mitral annular calcification. Mitral valve regurgitation was not assessed.  Tricuspid Valve: The tricuspid valve is structurally normal. There is normal tricuspid valve leaflet mobility. Tricuspid regurgitation was not assessed.  Pulmonic Valve: The pulmonic valve was not assessed. The pulmonic valve regurgitation was not assessed.  Pericardium: Trivial pericardial effusion.  Aorta: The aortic root was not assessed.      CONCLUSIONS:  1. The left ventricular systolic function is low normal with a visually estimated ejection fraction of 55%.  2. There is normal right ventricular global systolic function.  3. The left atrial size is mild to moderately dilated.  4. The right atrial size is mild to moderately dilated.  5. Trivial pericardial effusion.    RECOMMENDATIONS:  Technically suboptimal and limited study, therefore accuracy of above interpretation could be substantially diminished. Clinical correlation is advised. Consider additional imaging modalities if clinically indicated. Repeat full study if clinically indicated.      QUANTITATIVE DATA SUMMARY:    LV SYSTOLIC FUNCTION:  Normal Ranges:  EF-Visual:      55 %  LV EF Reported: 55 %      [id number] Roman Allen DO  Electronically signed on 6/26/2025 at 10:42:51 AM        ** Final **     Cath:  Cardiac Catheterization Procedure 06/26/2025      Cardiac Catheterization Procedure 02/06/2025    CV NCDR CATHPCI V5 COLLECTION FORM   Stress Test:  No results found for this or any previous visit from the past 1095 days.    Cardiac Imaging:  No results found for this or any previous visit from the past 1095 days.       Assessment/Plan     In summary, Mrs.  Manfred is an 79-year-old female with a medical history of S/P PPM (2023), hypertension, hyperlipidemia, diabetes, atrial fibrillation on Eliquis who was admitted and seen by me at [facility] on 5/17/2022 for runs of atrial fibrillation with ambulation.     Assessment     # Heart Failure with preserved LVEF (LVEF ~55%) / S/P PPM (11/29/2023)  - Patient returns today complaining of worsening SOB, with edema in both legs after PPM placement   - Last echo from 2020 with normal LVEF  - New echocardiogram (02/23/2024):   1. Left ventricular systolic function is normal with a 55% estimated ejection fraction.   2. There is reduced right ventricular systolic function.   3. Moderate pulmonic valve regurgitation.   4. Calculated pulmonary artery systolic pressure is 46 mmHg suggestive of pulmonary hypertension.   5. Poorly visualized anatomical structures due to suboptimal image quality.  - Patient have not started higher dose of Bumex as suggested before.  - Keep Bumex to 2mg daily   - Keep Lisinopril 40mg daily  - Patient returned previously felling well. However, she states that she increased her weight in 3lb this week and is feeling a liitle bit more shorness of breath than usual. She is also non-compliant with salt restriction - for example, diet rich in sausages.  - Keep Metoprolol tartrate 100mg BID.  - Notably, patient had a positive stress test in 2017 per anesthesia assessment. It was ultimately though to be related to cardiac scar. Cardiac MRI did not show signs of amyloidosis. Patient also has pulmonary hypertension.   - Left Heart Catheterization (01/10/2025):  Normal coronaries. Preserved LV systolic function.  - She will be referred for Watchman procedure.  - Follow up in heart failure clinic.   - Follow up in 6 months.     # Paroxysmal atrial fibrillation S/P LAAO - Watchman procedure (06/2025) with history of recurrent falls / GI bleeding  - Elevated [id number] score of 3 which implies  higher risk for thromboembolic events yearly, therefore should continue on stroke prophylaxis with DOAC - Eliquis 5mg BID.  - Patient is currently asymptomatic on rate control strategy with Metoprolol and Eliquis.   - Prior EKG showing atrial fibrillation.  - Prior echocardiogram shows normal left ventricle ejection fraction  - We had a thorough conversation about the triggers for atrial fibrillation, including alcohol, caffeine and chocolate.  - Counseled to exercise for better conditioning, for losing weight and to lower the baseline heart rate.  - Patient reports that she has been admitted in December 2024 in another hospital due to GI bleeding - hemorrhoids. She is back to DOAC - Eliquis. She will be schedule for Left Atrial Appendage Closure (LAAC) in [place].   - S/P Successful Left Atrial Appendage Closure (LAAC) in [place] with Dr. Barrientos, we have discussed the case with him.  - Plan will be for Eliquis at a lower dose of 2.5 mg twice daily x 3 months followed by aspirin 81 mg daily for life.    - Repeat Watchman CTA in 3 months to reassess the device.  - Watchman RN coordinator to follow-up per protocol.  - Follow up in 3 months.     # Hypertension  - Hypertensive on current regimen  - Keep Lisinopril 40mg daily, Metoprolol tartrate 100mg BID  - Keep Lisinopril 40mg daily.     # Diabetes & Hyperlipidemia  - LDL 39, HDL 30, Tri 329.  - Controlled by PCP.  - Keep home medication with Lovastatin 20mg daily.  - Patient allergic to Fenofibrate.   - Counseled on healthy diet and regular exercise.      # Hypothyroidism  - Keep Levothyroxine 137mcg daily     # S/P PPM (11/29/2023)  - Recent PPM placement  - EKG on Afib      We have discussed the most common side effects of the prescribed medications, indications, drug interactions, risks, complications, and alternatives of medications/therapeutics were explained and discussed. The patient has been requested to monitor closely for any untoward side effects or  complications of medications. The patient has been strongly advised to be compliant with the recommendations, all the questions and concerns have been addressed. The patient has been also instructed to call, to return sooner or to go to the emergency department if symptoms persist or get worsen. The patient voiced understanding and denies any further questions at this time.      This note was transcribed using the Dragon Dictation system. There may be grammatical, punctuation, or verbiage errors that occur with voice recognition programs.     Counseling greater than 50% of visit regarding all cardiac issues.     Thank you for allowing me to participate in the care of this patient. Please do not hesitate to contact me with any further questions or concerns.     Benjamin Baker MD  Cardiology        [1]   Past Medical History:  Diagnosis Date    Arrhythmia     Diabetes mellitus (Multi)     Encounter for full-term uncomplicated delivery     Normal vaginal delivery    Encounter for gynecological examination (general) (routine) without abnormal findings 09/01/2020    Encounter for routine gynecological examination    Hypertension     Other conditions influencing health status     Menstruation    Other fecal abnormalities     Stool guaiac positive    Personal history of other diseases of the circulatory system     History of CHF (congestive heart failure)    Personal history of other medical treatment 10/06/2021    H/O mammogram   [2]   Past Surgical History:  Procedure Laterality Date    CARDIAC CATHETERIZATION N/A 2/6/2025    Procedure: Left And Right Heart Cath, With LV;  Surgeon: Benjamin Baker MD;  Location: [facility] Cardiac Cath Lab;  Service: Cardiovascular;  Laterality: N/A;  8:30. CKD    CARDIAC CATHETERIZATION N/A 6/26/2025    Procedure: LAAO (Left Atrial Appendage Occlusion);  Surgeon: Blake Barrientos MD;  Location: [facility] Cardiac Cath Lab;  Service: Cardiovascular;  Laterality: N/A;  CT 1 week  prior    CT ABDOMEN PELVIS ANGIOGRAM W AND/OR WO IV CONTRAST  07/21/2023    CT ABDOMEN PELVIS ANGIOGRAM W AND/OR WO IV CONTRAST 7/21/2023 [facility] CT    INSERT / REPLACE / REMOVE PACEMAKER      OTHER SURGICAL HISTORY  10/17/2019    Throat surgery    OTHER SURGICAL HISTORY  10/17/2019    Cholecystectomy    OTHER SURGICAL HISTORY  12/12/2019    Shoulder surgery    OTHER SURGICAL HISTORY  02/19/2020    Knee surgery    OTHER SURGICAL HISTORY  09/22/2020    Knee replacement    OTHER SURGICAL HISTORY  12/12/2019    Carpal tunnel surgery    OTHER SURGICAL HISTORY  12/12/2019    Thyroidectomy    OTHER SURGICAL HISTORY  12/12/2019    Cataract surgery   [3]   Family History  Problem Relation Name Age of Onset    Diabetes Mother      Hypertension Mother      Diabetes Father      Hypertension Father     [4]   Allergies  Allergen Reactions    Fenofibrate Hives    Oxycodone-Acetaminophen Unknown     vomitting    Metformin Rash   [5]   Social History  Socioeconomic History    Marital status:    Tobacco Use    Smoking status: Never    Smokeless tobacco: Never   Vaping Use    Vaping status: Never Used   Substance and Sexual Activity    Alcohol use: Never    Drug use: Never    Sexual activity: Not Currently     Social Drivers of Health     Food Insecurity: No Food Insecurity (12/12/2024)    Received from [facility]    Hunger Vital Sign     Worried About Running Out of Food in the Last Year: Never true     Ran Out of Food in the Last Year: Never true   Transportation Needs: No Transportation Needs (12/12/2024)    Received from [facility]    PRAPARE - Transportation     Lack of Transportation (Medical): No     Lack of Transportation (Non-Medical): No   Intimate Partner Violence: Not At Risk (12/12/2024)    Received from [facility]    Humiliation, Afraid, Rape, and Kick questionnaire     Fear of Current or Ex-Partner: No     Emotionally Abused: No     Physically Abused: No     Sexually Abused: No   Housing Stability: Low Risk   "(12/12/2024)    Received from [facility] Stability Vital Sign     Unable to Pay for Housing in the Last Year: No     Number of Times Moved in the Last Year: 0     Homeless in the Last Year: No   [6]   Social History  Tobacco Use   Smoking Status Never   Smokeless Tobacco Never   [7]   Outpatient Encounter Medications as of 7/11/2025   Medication Sig Dispense Refill    acetaminophen 325 mg chewable tablet Chew if needed.      allopurinol (Zyloprim) 100 mg tablet Take 1 tablet (100 mg) by mouth once daily. 30 tablet 5    amiodarone (Pacerone) 200 mg tablet Take 1 tablet (200 mg) by mouth early in the morning..      apixaban (Eliquis) 5 mg tablet Take 0.5 tablets (2.5 mg) by mouth 2 times a day.      BD Ultra-Fine Micro Pen Needle 32 gauge x 1/4\" needle Up to three times daily 100 each 3    bumetanide (Bumex) 2 mg tablet Take 1 tablet (2 mg) by mouth once daily. Take a second tablet if your weight goes up by 2 pounds. 90 tablet 3    dapagliflozin propanediol (Farxiga) 10 mg tablet Take 1 tablet (10 mg) by mouth once every 24 hours. 90 tablet 3    flash glucose scanning reader (FreeStyle Alannah 2 [place]) misc Use as instructed 1 each 1    flash glucose sensor kit (FreeStyle Alannah 2 Sensor) kit Use as instructed 1 each 3    insulin lispro protamin-lispro (HumaLOG Mix 75-25 KwikPen) 100 unit/mL (75-25) pen INJECT 15 UNITS SUBCUTANEOUSLY IN THE MORNING and 15 UNITS NIGHTLY 9 mL 3    levothyroxine (Synthroid, Levoxyl) 125 mcg tablet Take 1 tablet (125 mcg) by mouth early in the morning.. Take on an empty stomach at the same time each day, either 30 to 60 minutes prior to breakfast 30 tablet 11    lisinopril 40 mg tablet Take 1 tablet (40 mg) by mouth once daily. 90 tablet 3    loratadine (Claritin) 10 mg tablet Take 1 tablet (10 mg) by mouth 1 time if needed.      lovastatin (Mevacor) 20 mg tablet Take 1 tablet (20 mg) by mouth once daily at bedtime. 90 tablet 3    metoprolol tartrate (Lopressor) 50 mg tablet Take " 1 tablet by mouth 2 times a day. 60 tablet 5    potassium chloride CR 20 mEq ER tablet Take 1 tablet (20 mEq) by mouth 3 times a day. 60 tablet 11    sennosides-docusate sodium (Melissa-Colace) 8.6-50 mg tablet Take 1 tablet by mouth if needed for constipation.      [DISCONTINUED] calcium polycarbophiL (Fibercon) 625 mg tablet Take 1 tablet (625 mg) by mouth once daily. (Patient not taking: Reported on 7/10/2025)      [DISCONTINUED] dapagliflozin propanediol (Farxiga) 10 mg tablet Take 1 tablet (10 mg) by mouth once every 24 hours. 90 tablet 3    [DISCONTINUED] lovastatin (Mevacor) 20 mg tablet Take 1 tablet (20 mg) by mouth once daily at bedtime. 90 tablet 3    [DISCONTINUED] pantoprazole (ProtoNix) 40 mg EC tablet Take 1 tablet (40 mg) by mouth once daily in the morning. Take before meals. Do not crush, chew, or split. (Patient not taking: Reported on 7/10/2025)       No facility-administered encounter medications on file as of 7/11/2025.

## 2025-07-30 DIAGNOSIS — I10 PRIMARY HYPERTENSION: ICD-10-CM

## 2025-07-30 RX ORDER — METOPROLOL TARTRATE 50 MG/1
50 TABLET ORAL 2 TIMES DAILY
Qty: 60 TABLET | Refills: 0 | Status: SHIPPED | OUTPATIENT
Start: 2025-07-30 | End: 2025-08-29

## 2025-08-13 ENCOUNTER — HOSPITAL ENCOUNTER (OUTPATIENT)
Dept: CARDIOLOGY | Facility: CLINIC | Age: 80
Discharge: HOME | End: 2025-08-13
Payer: COMMERCIAL

## 2025-08-13 DIAGNOSIS — I44.2 ATRIOVENTRICULAR BLOCK, COMPLETE (MULTI): ICD-10-CM

## 2025-08-13 DIAGNOSIS — Z95.0 PACEMAKER: ICD-10-CM

## 2025-08-13 PROCEDURE — 93296 REM INTERROG EVL PM/IDS: CPT

## 2025-08-15 ENCOUNTER — APPOINTMENT (OUTPATIENT)
Dept: CARDIOLOGY | Facility: CLINIC | Age: 80
End: 2025-08-15
Payer: COMMERCIAL

## 2025-08-28 DIAGNOSIS — R60.0 PEDAL EDEMA: ICD-10-CM

## 2025-08-29 DIAGNOSIS — R60.0 PEDAL EDEMA: ICD-10-CM

## 2025-08-29 RX ORDER — BUMETANIDE 2 MG/1
TABLET ORAL
Qty: 90 TABLET | Refills: 0 | Status: SHIPPED | OUTPATIENT
Start: 2025-08-29

## 2025-09-16 ENCOUNTER — APPOINTMENT (OUTPATIENT)
Dept: CARDIOLOGY | Facility: CLINIC | Age: 80
End: 2025-09-16
Payer: COMMERCIAL

## 2025-09-23 ENCOUNTER — APPOINTMENT (OUTPATIENT)
Dept: CARDIOLOGY | Facility: HOSPITAL | Age: 80
End: 2025-09-23
Payer: COMMERCIAL

## 2025-09-26 ENCOUNTER — APPOINTMENT (OUTPATIENT)
Dept: RADIOLOGY | Facility: CLINIC | Age: 80
End: 2025-09-26
Payer: COMMERCIAL

## 2025-10-17 ENCOUNTER — APPOINTMENT (OUTPATIENT)
Dept: CARDIOLOGY | Facility: CLINIC | Age: 80
End: 2025-10-17
Payer: COMMERCIAL

## 2025-11-12 ENCOUNTER — APPOINTMENT (OUTPATIENT)
Dept: NEPHROLOGY | Facility: CLINIC | Age: 80
End: 2025-11-12
Payer: COMMERCIAL

## (undated) DEVICE — CATHETER, ANGIO, IMPULSE, PIG 155, 6 FR X 110 CM

## (undated) DEVICE — CARDIAC CATH KIT, MANIFOLD, CUSTOM

## (undated) DEVICE — CATHETER, ACUNAV ULTRASOUND, 8FR 90CM, GE

## (undated) DEVICE — SHEATH, PINNACLE, W/.038 GUIDEWIRE, 10 CM,  8FR INTRODUCER, 8FR DIA, 2.5 CM DIALATOR

## (undated) DEVICE — ACCESS KIT, S-MAK MINI, 4FR 10CM 0.018IN 40CM, NT/PT, ECHO ENHANCE NEEDLE

## (undated) DEVICE — SHEATH, PINNACLE, W/.038 GUIDEWIRE, 10 CM,  7FR INTRODUCER, 7FR DIA, 2.5 CM DIALATOR

## (undated) DEVICE — SYSTEM, ACCESS, FXD DBL CURVE, WATCHMAN

## (undated) DEVICE — SHEATH, GLIDESHEATH, SLENDER, 6FR 10CM

## (undated) DEVICE — CATHETER, OPTITORQUE, 5FR, TIG, 1H/100CM

## (undated) DEVICE — PACK, ANGIO FEMORAL, CUSTOM, SMC

## (undated) DEVICE — GUIDEWIRE, INQWIRE, 3MM J, .035 X 210CM, FIXED

## (undated) DEVICE — ELECTRODE, QUICK-PACE, EDGE RTS, COMBO

## (undated) DEVICE — GUIDEWIRE, INQWIRE, 0.025 X 150CM, FIXED CORE, 3MM J-TIP

## (undated) DEVICE — TR BAND, RADIAL COMPRESSION, STANDARD, 24CM

## (undated) DEVICE — CATHETER, WEDGE PRESSURE, BALLOON, DOUBLE LUMEN, 5 FR, 110 CM

## (undated) DEVICE — VERSACROSS KIT, ACCESS SOLUTION, RF WIRE 180CM

## (undated) DEVICE — SHEATH, PINNACLE, W/O GUIDEWIRE, 8FR INTRODUCER,  8FR DIA, 2.5 CM DILATOR

## (undated) DEVICE — KIT, NAMIC STANDARD, LEFT HEART, W/ INTEGRATED COMP MANIFOLD

## (undated) DEVICE — SHEATH, GLIDESHEATH, SLENDER, 5FR 10CM

## (undated) DEVICE — DEVICE, CLOSURE, PERCLOSE, PROSTYLE